# Patient Record
Sex: FEMALE | Race: WHITE | Employment: FULL TIME | URBAN - METROPOLITAN AREA
[De-identification: names, ages, dates, MRNs, and addresses within clinical notes are randomized per-mention and may not be internally consistent; named-entity substitution may affect disease eponyms.]

---

## 2017-11-17 ENCOUNTER — OFFICE VISIT (OUTPATIENT)
Dept: FAMILY MEDICINE CLINIC | Age: 37
End: 2017-11-17

## 2017-11-17 VITALS
BODY MASS INDEX: 44.41 KG/M2 | RESPIRATION RATE: 16 BRPM | DIASTOLIC BLOOD PRESSURE: 78 MMHG | OXYGEN SATURATION: 99 % | WEIGHT: 293 LBS | HEIGHT: 68 IN | SYSTOLIC BLOOD PRESSURE: 122 MMHG | HEART RATE: 63 BPM

## 2017-11-17 DIAGNOSIS — Z83.3 FAMILY HISTORY OF DIABETES MELLITUS: ICD-10-CM

## 2017-11-17 DIAGNOSIS — Z00.00 HEALTHY ADULT ON ROUTINE PHYSICAL EXAMINATION: Primary | ICD-10-CM

## 2017-11-17 DIAGNOSIS — Z13.220 SCREENING FOR HYPERLIPIDEMIA: ICD-10-CM

## 2017-11-17 LAB
A/G RATIO: 1.5 (ref 1.1–2.2)
ALBUMIN SERPL-MCNC: 4.3 G/DL (ref 3.4–5)
ALP BLD-CCNC: 74 U/L (ref 40–129)
ALT SERPL-CCNC: 13 U/L (ref 10–40)
ANION GAP SERPL CALCULATED.3IONS-SCNC: 13 MMOL/L (ref 3–16)
AST SERPL-CCNC: 17 U/L (ref 15–37)
BILIRUB SERPL-MCNC: 0.6 MG/DL (ref 0–1)
BUN BLDV-MCNC: 20 MG/DL (ref 7–20)
CALCIUM SERPL-MCNC: 9.4 MG/DL (ref 8.3–10.6)
CHLORIDE BLD-SCNC: 104 MMOL/L (ref 99–110)
CHOLESTEROL, TOTAL: 197 MG/DL (ref 0–199)
CO2: 27 MMOL/L (ref 21–32)
CREAT SERPL-MCNC: 0.6 MG/DL (ref 0.6–1.1)
GFR AFRICAN AMERICAN: >60
GFR NON-AFRICAN AMERICAN: >60
GLOBULIN: 2.9 G/DL
GLUCOSE BLD-MCNC: 73 MG/DL (ref 70–99)
HDLC SERPL-MCNC: 68 MG/DL (ref 40–60)
LDL CHOLESTEROL CALCULATED: 117 MG/DL
POTASSIUM SERPL-SCNC: 4.1 MMOL/L (ref 3.5–5.1)
SODIUM BLD-SCNC: 144 MMOL/L (ref 136–145)
TOTAL PROTEIN: 7.2 G/DL (ref 6.4–8.2)
TRIGL SERPL-MCNC: 58 MG/DL (ref 0–150)
VLDLC SERPL CALC-MCNC: 12 MG/DL

## 2017-11-17 PROCEDURE — 99385 PREV VISIT NEW AGE 18-39: CPT | Performed by: FAMILY MEDICINE

## 2017-11-17 PROCEDURE — 36415 COLL VENOUS BLD VENIPUNCTURE: CPT | Performed by: FAMILY MEDICINE

## 2017-11-17 ASSESSMENT — ENCOUNTER SYMPTOMS
NAUSEA: 0
DIARRHEA: 0
SHORTNESS OF BREATH: 0
CONSTIPATION: 0
ABDOMINAL PAIN: 0

## 2017-11-17 ASSESSMENT — PATIENT HEALTH QUESTIONNAIRE - PHQ9
1. LITTLE INTEREST OR PLEASURE IN DOING THINGS: 0
2. FEELING DOWN, DEPRESSED OR HOPELESS: 0
SUM OF ALL RESPONSES TO PHQ QUESTIONS 1-9: 0
SUM OF ALL RESPONSES TO PHQ9 QUESTIONS 1 & 2: 0

## 2017-11-17 NOTE — PATIENT INSTRUCTIONS
Patient Education        Well Visit, Ages 25 to 48: Care Instructions  Your Care Instructions  Physical exams can help you stay healthy. Your doctor has checked your overall health and may have suggested ways to take good care of yourself. He or she also may have recommended tests. At home, you can help prevent illness with healthy eating, regular exercise, and other steps. Follow-up care is a key part of your treatment and safety. Be sure to make and go to all appointments, and call your doctor if you are having problems. It's also a good idea to know your test results and keep a list of the medicines you take. How can you care for yourself at home? · Reach and stay at a healthy weight. This will lower your risk for many problems, such as obesity, diabetes, heart disease, and high blood pressure. · Get at least 30 minutes of physical activity on most days of the week. Walking is a good choice. You also may want to do other activities, such as running, swimming, cycling, or playing tennis or team sports. Discuss any changes in your exercise program with your doctor. · Do not smoke or allow others to smoke around you. If you need help quitting, talk to your doctor about stop-smoking programs and medicines. These can increase your chances of quitting for good. · Talk to your doctor about whether you have any risk factors for sexually transmitted infections (STIs). Having one sex partner (who does not have STIs and does not have sex with anyone else) is a good way to avoid these infections. · Use birth control if you do not want to have children at this time. Talk with your doctor about the choices available and what might be best for you. · Protect your skin from too much sun. When you're outdoors from 10 a.m. to 4 p.m., stay in the shade or cover up with clothing and a hat with a wide brim. Wear sunglasses that block UV rays.  Even when it's cloudy, put broad-spectrum sunscreen (SPF 30 or higher) on any condoms. For men  · Tests for sexually transmitted infections (STIs). Ask whether you should have tests for STIs. You may be at risk if you have sex with more than one person, especially if you do not wear a condom. · Testicular cancer exam. Ask your doctor whether you should check your testicles regularly. · Prostate exam. Talk to your doctor about whether you should have a blood test (called a PSA test) for prostate cancer. Experts differ on whether and when men should have this test. Some experts suggest it if you are older than 39 and are -American or have a father or brother who got prostate cancer when he was younger than 72. When should you call for help? Watch closely for changes in your health, and be sure to contact your doctor if you have any problems or symptoms that concern you. Where can you learn more? Go to https://[facility]ides. org and sign in to your [facility] [employer] account. Enter P072 in the CloudSafe box to learn more about \"Well Visit, Ages 25 to 48: Care Instructions. \"     If you do not have an account, please click on the \"Sign Up Now\" link. Current as of: July 19, 2016  Content Version: 11.3  © 3751-2577 [employer]. Care instructions adapted under license by [facility] ([facility] [place] If you have questions about a medical condition or this instruction, always ask your healthcare professional. [address]  any warranty or liability for your use of this information.

## 2017-11-17 NOTE — PROGRESS NOTES
Chief Complaint   Patient presents with    [facility]         HPI      30 y.o. female presents today to [facility] and for annual exam.  She has a concerned regarding a bump on her arm that showed up 3 weeks ago. Drained blood and small amount of white clear fluid. Non tender. Denies fever or chills. She also reports history of seizures. Started at 10years old. She is working on diet and exercise and has lost 65 pounds since January doing weight watchers and swimming. She is unsure of her last pap smear. There is no problem list on file for this patient. Past Medical History:   Diagnosis Date    Seizures ([facility] [address]     last epileptic episode was 12 years ago       Past Surgical History:   Procedure Laterality Date    KNEE CARTILAGE SURGERY      WRIST SURGERY      Bone spur removal     Most Recent Immunizations   Administered Date(s) Administered    Influenza, Quadv, 3 Years and older, IM 11/09/2017        No current outpatient prescriptions on file. No current facility-administered medications for this visit. No Known Allergies    Social History     Social History    Marital status: Single     Spouse name: N/A    Number of children: N/A    Years of education: N/A     Social History Main Topics    Smoking status: Never Smoker    Smokeless tobacco: Never Used    Alcohol use 0.6 oz/week     1 Glasses of wine per week      Comment: occasional    Drug use: No    Sexual activity: No     Other Topics Concern    None     Social History Narrative    None     Family History   Problem Relation Age of Onset    Diabetes Mother     High Blood Pressure Father     High Blood Pressure Brother                               Review Of Systems    Review of Systems   Constitutional: Negative for chills and fever. Respiratory: Negative for shortness of breath. Cardiovascular: Negative for chest pain. Gastrointestinal: Negative for abdominal pain, constipation, diarrhea and nausea. Genitourinary: Negative for dysuria. Psychiatric/Behavioral: Negative for behavioral problems. PHYSICAL EXAMINATION:    /78 (Site: Left Arm, Position: Sitting, Cuff Size: Large Adult)   Pulse 63   Resp 16   Ht 5' 7.5\" (1.715 m)   Wt 298 lb (135.2 kg)   LMP 11/09/2017 (Exact Date)   SpO2 99%   Breastfeeding? No   BMI 45.98 kg/m²     Physical Exam   Constitutional: She is oriented to person, place, and time. She appears well-developed and well-nourished. No distress. HENT:   Head: Normocephalic and atraumatic. Right Ear: External ear normal.   Left Ear: External ear normal.   Eyes: Conjunctivae and EOM are normal. Pupils are equal, round, and reactive to light. Right eye exhibits no discharge. Left eye exhibits no discharge. Cardiovascular: Normal rate, regular rhythm and normal heart sounds. Pulmonary/Chest: Effort normal and breath sounds normal. No respiratory distress. She has no wheezes. Abdominal: Soft. Bowel sounds are normal. She exhibits no distension. There is no tenderness. Neurological: She is alert and oriented to person, place, and time. Skin: Skin is warm and dry. She is not diaphoretic. Dime size wound in axilla with opening. Non tender, no warmth, minimal erythema and no purulent drainage. Psychiatric: She has a normal mood and affect. Vitals reviewed. ASSESSMENT:   Well Adult, See encounter diagnoses  Sarah was seen today for establish care. Diagnoses and all orders for this visit:    Healthy adult on routine physical examination  Area in axilla appears to be healing well. Advised to continue with neosporin. Family history of diabetes mellitus  -     HEMOGLOBIN A1C  -     Comprehensive Metabolic Panel    Screening for hyperlipidemia  -     LIPID PANEL  -     Comprehensive Metabolic Panel          Plan:   See orders and medications filed with this encounter.   The patient is advised to continue current healthy lifestyle patterns and return for routine annual checkups. Encouraged healthy diet, regular exercise. Labs checked per orders. Return in about 4 weeks (around 12/15/2017) for pap smear.

## 2017-11-18 LAB
ESTIMATED AVERAGE GLUCOSE: 108.3 MG/DL
HBA1C MFR BLD: 5.4 %

## 2017-12-19 ENCOUNTER — OFFICE VISIT (OUTPATIENT)
Dept: FAMILY MEDICINE CLINIC | Age: 37
End: 2017-12-19

## 2017-12-19 VITALS
SYSTOLIC BLOOD PRESSURE: 118 MMHG | HEIGHT: 68 IN | DIASTOLIC BLOOD PRESSURE: 74 MMHG | HEART RATE: 65 BPM | WEIGHT: 293 LBS | RESPIRATION RATE: 16 BRPM | OXYGEN SATURATION: 99 % | TEMPERATURE: 98.2 F | BODY MASS INDEX: 44.41 KG/M2

## 2017-12-19 DIAGNOSIS — Z11.3 SCREEN FOR STD (SEXUALLY TRANSMITTED DISEASE): ICD-10-CM

## 2017-12-19 DIAGNOSIS — Z01.419 ENCOUNTER FOR GYNECOLOGICAL EXAMINATION WITHOUT ABNORMAL FINDING: Primary | ICD-10-CM

## 2017-12-19 DIAGNOSIS — Z23 NEED FOR TDAP VACCINATION: ICD-10-CM

## 2017-12-19 PROCEDURE — 90715 TDAP VACCINE 7 YRS/> IM: CPT | Performed by: FAMILY MEDICINE

## 2017-12-19 PROCEDURE — 90471 IMMUNIZATION ADMIN: CPT | Performed by: FAMILY MEDICINE

## 2017-12-19 PROCEDURE — 99395 PREV VISIT EST AGE 18-39: CPT | Performed by: FAMILY MEDICINE

## 2017-12-19 NOTE — PATIENT INSTRUCTIONS
Patient Education        Pap Test: Care Instructions  Your Care Instructions    The Pap test (also called a Pap smear) is a screening test for cancer of the cervix, which is the lower part of the uterus that opens into the vagina. The test can help your doctor find early changes in the cells that could lead to cancer. The sample of cells taken during your test has been sent to a lab so that an expert can look at the cells. It usually takes a week or two to get the results back. Follow-up care is a key part of your treatment and safety. Be sure to make and go to all appointments, and call your doctor if you are having problems. It's also a good idea to know your test results and keep a list of the medicines you take. What do the results mean? · A normal result means that the test did not find any abnormal cells in the sample. · An abnormal result can mean many things. Most of these are not cancer. The results of your test may be abnormal because:  ¨ You have an infection of the vagina or cervix, such as a yeast infection. ¨ You have an IUD (intrauterine device for birth control). ¨ You have low estrogen levels after menopause that are causing the cells to change. ¨ You have cell changes that may be a sign of precancer or cancer. The results are ranked based on how serious the changes might be. There are many other reasons why you might not get a normal result. If the results were abnormal, you may need to get another test within a few weeks or months. If the results show changes that could be a sign of cancer, you may need a test called a colposcopy, which provides a more complete view of the cervix. Sometimes the lab cannot use the sample because it does not contain enough cells or was not preserved well. If so, you may need to have the test again. This is not common, but it does happen from time to time. When should you call for help?   Watch closely for changes in your health, and be sure to contact your

## 2017-12-20 LAB
CANDIDA SPECIES, DNA PROBE: ABNORMAL
GARDNERELLA VAGINALIS, DNA PROBE: ABNORMAL
HIV-1 AND HIV-2 ANTIBODIES: NORMAL
RPR: NORMAL
TRICHOMONAS VAGINALIS DNA: ABNORMAL

## 2017-12-21 LAB
HPV COMMENT: NORMAL
HPV TYPE 16: NOT DETECTED
HPV TYPE 18: NOT DETECTED
HPVOH (OTHER TYPES): NOT DETECTED

## 2017-12-22 ENCOUNTER — TELEPHONE (OUTPATIENT)
Dept: FAMILY MEDICINE CLINIC | Age: 37
End: 2017-12-22

## 2017-12-22 DIAGNOSIS — B96.89 BACTERIAL VAGINOSIS: Primary | ICD-10-CM

## 2017-12-22 DIAGNOSIS — N76.0 BACTERIAL VAGINOSIS: Primary | ICD-10-CM

## 2017-12-22 RX ORDER — METRONIDAZOLE 500 MG/1
500 TABLET ORAL 2 TIMES DAILY
Qty: 14 TABLET | Refills: 0 | Status: SHIPPED | OUTPATIENT
Start: 2017-12-22 | End: 2017-12-29

## 2017-12-27 LAB
C. TRACHOMATIS DNA,THIN PREP: NEGATIVE
N. GONORRHOEAE DNA, THIN PREP: NEGATIVE

## 2018-06-08 ENCOUNTER — OFFICE VISIT (OUTPATIENT)
Dept: FAMILY MEDICINE CLINIC | Age: 38
End: 2018-06-08

## 2018-06-08 VITALS
OXYGEN SATURATION: 98 % | SYSTOLIC BLOOD PRESSURE: 120 MMHG | HEART RATE: 70 BPM | DIASTOLIC BLOOD PRESSURE: 80 MMHG | WEIGHT: 277 LBS | BODY MASS INDEX: 42.72 KG/M2

## 2018-06-08 DIAGNOSIS — M67.88 ACHILLES TENDINOSIS OF LEFT LOWER EXTREMITY: Primary | ICD-10-CM

## 2018-06-08 PROCEDURE — 99214 OFFICE O/P EST MOD 30 MIN: CPT | Performed by: FAMILY MEDICINE

## 2018-06-20 ENCOUNTER — OFFICE VISIT (OUTPATIENT)
Dept: ORTHOPEDIC SURGERY | Age: 38
End: 2018-06-20

## 2018-06-20 VITALS
HEIGHT: 68 IN | HEART RATE: 78 BPM | BODY MASS INDEX: 41.97 KG/M2 | DIASTOLIC BLOOD PRESSURE: 78 MMHG | SYSTOLIC BLOOD PRESSURE: 130 MMHG | WEIGHT: 276.9 LBS

## 2018-06-20 DIAGNOSIS — M76.61 ACHILLES TENDINITIS, RIGHT LEG: ICD-10-CM

## 2018-06-20 DIAGNOSIS — M79.671 PAIN OF RIGHT HEEL: Primary | ICD-10-CM

## 2018-06-20 DIAGNOSIS — M65.9 TENOSYNOVITIS OF RIGHT ANKLE: ICD-10-CM

## 2018-06-20 PROBLEM — M65.971 TENOSYNOVITIS OF RIGHT ANKLE: Status: ACTIVE | Noted: 2018-06-20

## 2018-06-20 PROCEDURE — 99203 OFFICE O/P NEW LOW 30 MIN: CPT | Performed by: PODIATRIST

## 2018-06-20 PROCEDURE — L4361 PNEUMA/VAC WALK BOOT PRE OTS: HCPCS | Performed by: PODIATRIST

## 2018-06-21 ENCOUNTER — TELEPHONE (OUTPATIENT)
Dept: ORTHOPEDIC SURGERY | Age: 38
End: 2018-06-21

## 2018-07-01 DIAGNOSIS — Z86.69 HISTORY OF SEIZURE DISORDER: Primary | ICD-10-CM

## 2018-07-11 ENCOUNTER — OFFICE VISIT (OUTPATIENT)
Dept: ORTHOPEDIC SURGERY | Age: 38
End: 2018-07-11

## 2018-07-11 VITALS
HEIGHT: 69 IN | WEIGHT: 277.6 LBS | BODY MASS INDEX: 41.12 KG/M2 | SYSTOLIC BLOOD PRESSURE: 124 MMHG | HEART RATE: 78 BPM | DIASTOLIC BLOOD PRESSURE: 78 MMHG

## 2018-07-11 DIAGNOSIS — M76.61 ACHILLES TENDINITIS, RIGHT LEG: Primary | ICD-10-CM

## 2018-07-11 PROCEDURE — 99213 OFFICE O/P EST LOW 20 MIN: CPT | Performed by: PODIATRIST

## 2018-07-11 NOTE — PROGRESS NOTES
HISTORY OF PRESENT ILLNESS:  This is a return visit for a patient with a chief complaint of right Achilles tendinitis. She states that she's no longer having pain and she has not walked out of the boot much. PHYSICAL EXAM:  She currently does not have any palpable tenderness of the Achilles tendon. There is focal edema over the Achilles tendon. There is no erythema, ecchymosis, or edema otherwise. There is minimal pain of the Achilles tendon insertion itself. There is no palpable deficit and Snowden's test is negative for a complete rupture. There is full-strength with plantar flexion at the ankle. This is symmetrical.    Pedal pulses are palpable, bilateral.  The sensation is grossly intact, bilateral.      ASSESSMENT: Achilles Tendinitis and Tenosynovitis, right      PLAN: She will try to slowly transition out of the boot and into over-the-counter orthotics and athletic shoes. She will gradually increase her activity as we discussed. I'll see her back as needed.

## 2018-08-31 DIAGNOSIS — M25.562 ACUTE PAIN OF LEFT KNEE: Primary | ICD-10-CM

## 2018-09-11 ENCOUNTER — INITIAL CONSULT (OUTPATIENT)
Dept: NEUROLOGY | Age: 38
End: 2018-09-11

## 2018-09-11 VITALS
DIASTOLIC BLOOD PRESSURE: 72 MMHG | OXYGEN SATURATION: 98 % | HEIGHT: 69 IN | BODY MASS INDEX: 41.92 KG/M2 | WEIGHT: 283 LBS | SYSTOLIC BLOOD PRESSURE: 123 MMHG | HEART RATE: 61 BPM

## 2018-09-11 DIAGNOSIS — G40.309 GENERALIZED IDIOPATHIC EPILEPSY, NOT INTRACTABLE, WITHOUT STATUS EPILEPTICUS (HCC): Primary | ICD-10-CM

## 2018-09-11 DIAGNOSIS — G93.2 PSEUDOTUMOR CEREBRI SYNDROME: ICD-10-CM

## 2018-09-11 PROCEDURE — 99204 OFFICE O/P NEW MOD 45 MIN: CPT | Performed by: PSYCHIATRY & NEUROLOGY

## 2018-09-11 NOTE — PROGRESS NOTES
musculoskeletal, endocrine, skin, SHEENT, genitourinary, psychiatric and neurologic systems was obtained and updated today and is unremarkable except as mentioned in my HPI        Exam:   Constitutional:   Vitals:    09/11/18 1208   BP: 123/72   Pulse: 61   SpO2: 98%   Weight: 283 lb (128.4 kg)   Height: 5' 9\" (1.753 m)       General appearance: well-nourished. Eye: No icterus. No blurring of optic disc. Neck: supple  Cardiovascular: No carotid bruit. No lower leg edema with good pulsation. Mental Status: Oriented to person, place, problem, and time. Fluent speech. Good fund of knowledge. Normal attention span and concentration. Cranial Nerves:   II: Visual fields: Full to confrontation  III: Pupils: equal, round, reactive to light  III,IV,VI: Extra Ocular Movements are intact. No nystagmus  V: Facial sensation is intact to pin prick and light touch  VII: Facial strength and movements: intact and symmetric. VIII: Hearing: Intact to finger rub bilaterally  IX: Palate elevation is symmetric  XI: Shoulder shrug is intact  XII: Tongue movements are normal  Musculoskeletal: 5/5 in all 4 extremities. Normal tone. Reflexes: Bilateral biceps 2/4, triceps 2/4, brachial radialis 2/4, knee 2/4 and ankle 2/4. Planters: flexor bilaterally. Coordination: no pronator drift, no dysmetria with FNF testing. No tremors. Sensation: normal to all modalities. Gait/Posture: steady      Medical decision making:  I personally reviewed and updated social history, past medical history, medications, allergy, surgical history, and family history as documented in the patient's electronic health records. Labs and/or neuroimaging and other test results reviewed and discussed with the patient. Reviewed notes from other physicians. Provided patient education regarding risk, benefits and treatment options as well as adherence to medication regimen and side effect from these medications. Diagnosis Orders   1.  Generalized

## 2018-09-11 NOTE — LETTER
Comment: occasional     Family History   Problem Relation Age of Onset    Diabetes Mother     High Blood Pressure Father     High Blood Pressure Brother      Past Surgical History:   Procedure Laterality Date    KNEE CARTILAGE SURGERY      WRIST SURGERY      Bone spur removal       ROS : A 10-12 system review of constitutional, cardiovascular, respiratory, musculoskeletal, endocrine, skin, SHEENT, genitourinary, psychiatric and neurologic systems was obtained and updated today and is unremarkable except as mentioned in my HPI        Exam:   Constitutional:   Vitals:    09/11/18 1208   BP: 123/72   Pulse: 61   SpO2: 98%   Weight: 283 lb (128.4 kg)   Height: 5' 9\" (1.753 m)       General appearance: well-nourished. Eye: No icterus. No blurring of optic disc. Neck: supple  Cardiovascular: No carotid bruit. No lower leg edema with good pulsation. Mental Status: Oriented to person, place, problem, and time. Fluent speech. Good fund of knowledge. Normal attention span and concentration. Cranial Nerves:   II: Visual fields: Full to confrontation  III: Pupils: equal, round, reactive to light  III,IV,VI: Extra Ocular Movements are intact. No nystagmus  V: Facial sensation is intact to pin prick and light touch  VII: Facial strength and movements: intact and symmetric. VIII: Hearing: Intact to finger rub bilaterally  IX: Palate elevation is symmetric  XI: Shoulder shrug is intact  XII: Tongue movements are normal  Musculoskeletal: 5/5 in all 4 extremities. Normal tone. Reflexes: Bilateral biceps 2/4, triceps 2/4, brachial radialis 2/4, knee 2/4 and ankle 2/4. Planters: flexor bilaterally. Coordination: no pronator drift, no dysmetria with FNF testing. No tremors. Sensation: normal to all modalities.   Gait/Posture: steady      Medical decision making:  I personally reviewed and updated social history, past medical history, medications, allergy, surgical history, and family history as documented in the patient's electronic health records. Labs and/or neuroimaging and other test results reviewed and discussed with the patient. Reviewed notes from other physicians. Provided patient education regarding risk, benefits and treatment options as well as adherence to medication regimen and side effect from these medications. Diagnosis Orders   1. Generalized idiopathic epilepsy, not intractable, without status epilepticus ([facility] [address]  MRI Brain WO Contrast    EEG   2. Pseudotumor cerebri syndrome  MRI Brain WO Contrast       Assessment:  The patient describes history of epilepsy which could be consistent with idiopathic generalized epilepsy. Has been seizure-free for quite some time. We'll repeat EEG to rule out tendency for epilepsy or interictal epileptiform discharges. If EEG is abnormal, would recommend restarting AED due to high risk of recurrence. In the meantime, we discussed risk of scuba diving classes and possible seizure recurrence. I explained to the patient that I cannot guarantee recurrence of her seizure while scuba diving which could be somewhat threatening. The patient was advised to consider different sport activity. As for possible pseudotumor, we'll schedule MRI of the brain first followed by LP to check for opening and closing pressure. The patient is asymptomatic. Could be caused by her history of weight gain. She'll follow-up with me after the above recommendations. Please do not hesitate to contact me, should you have any questions or concerns regarding the care of Tom Figueroa     Sincerely,    Alvino Blackwell MD    This dictation was generated by voice recognition computer software. Although all attempts are made to edit the dictation for accuracy, there may be errors in the transcription that are not intended.

## 2018-09-13 ENCOUNTER — OFFICE VISIT (OUTPATIENT)
Dept: ORTHOPEDIC SURGERY | Age: 38
End: 2018-09-13

## 2018-09-13 VITALS — HEIGHT: 69 IN | WEIGHT: 283.07 LBS | BODY MASS INDEX: 41.93 KG/M2

## 2018-09-13 DIAGNOSIS — M76.52 PATELLAR TENDINITIS OF LEFT KNEE: ICD-10-CM

## 2018-09-13 DIAGNOSIS — M25.562 LEFT KNEE PAIN, UNSPECIFIED CHRONICITY: Primary | ICD-10-CM

## 2018-09-13 PROCEDURE — 99243 OFF/OP CNSLTJ NEW/EST LOW 30: CPT | Performed by: ORTHOPAEDIC SURGERY

## 2018-09-13 RX ORDER — DEXAMETHASONE SODIUM PHOSPHATE 4 MG/ML
INJECTION, SOLUTION INTRA-ARTICULAR; INTRALESIONAL; INTRAMUSCULAR; INTRAVENOUS; SOFT TISSUE
Qty: 30 ML | Refills: 0 | Status: SHIPPED | OUTPATIENT
Start: 2018-09-13 | End: 2019-07-19 | Stop reason: CLARIF

## 2018-09-13 NOTE — PROGRESS NOTES
normal.      LUMBAR SPINE: The skin is warm and dry. There is no swelling, warmth, or erythema. Range of motion is within normal limits. There is no paraspinal or spinous process tenderness. Ipsilateral and contralateral straight leg raising tests are negative. The distal neurovascular exam is grossly intact and symmetric. X-RAYS: 4 views weightbearing AP, lateral. PA and sunrise of the left knee were obtained and reviewed, they show no periosteal reaction, medullary lesions, or osteopenia. Joint spaces are well maintained. No evidence of fracture or dislocation. Assessment :  Left knee patella tendinitis    Impression:  Encounter Diagnoses   Name Primary?  Left knee pain, unspecified chronicity Yes    Patellar tendinitis of left knee        Office Procedures:  Orders Placed This Encounter   Procedures    XR KNEE LEFT (MIN 4 VIEWS)    [facility] - [facility]     Referral Priority:   Routine     Referral Type:   Eval and Treat     Referral Reason:   Specialty Services Required     Requested Specialty:   Physical Therapy     Number of Visits Requested:   1     No orders of the defined types were placed in this encounter. Treatment Plan:  We discussed the pathophysiology of patella tendinitis, this is an overuse injury directly related to walking specifically hills, I recommend avoiding aggravating activities until symptoms resolve, also recommended continuation of over-the-counter anti-inflammatories to help decrease inflammation, continue ice, we gave her prescription for physical therapy to include iontophoresis and dry needling. She can resume all activities as tolerated. I highly encouraged crosstraining and decreasing hills during her training to help alleviate symptoms. She should be just fine to participate in the walk this coming fall. She will follow up with me in 2-3 months for reevaluation if her symptoms are not significantly improved.  Patient agrees with this plan, all of their questions were answered best of our ability and to their satisfaction.         Kathrin Cervantes

## 2018-09-17 ENCOUNTER — HOSPITAL ENCOUNTER (OUTPATIENT)
Dept: MRI IMAGING | Age: 38
Discharge: HOME | End: 2018-09-17
Payer: COMMERCIAL

## 2018-09-17 ENCOUNTER — HOSPITAL ENCOUNTER (OUTPATIENT)
Dept: NEUROLOGY | Age: 38
Discharge: HOME | End: 2018-09-17
Payer: COMMERCIAL

## 2018-09-17 DIAGNOSIS — G93.2 PSEUDOTUMOR CEREBRI SYNDROME: ICD-10-CM

## 2018-09-17 DIAGNOSIS — G40.309 GENERALIZED IDIOPATHIC EPILEPSY, NOT INTRACTABLE, WITHOUT STATUS EPILEPTICUS (HCC): ICD-10-CM

## 2018-09-17 PROCEDURE — 70551 MRI BRAIN STEM W/O DYE: CPT

## 2018-09-17 PROCEDURE — 95819 EEG AWAKE AND ASLEEP: CPT

## 2018-09-17 PROCEDURE — 95816 EEG AWAKE AND DROWSY: CPT | Performed by: PSYCHIATRY & NEUROLOGY

## 2018-09-18 ENCOUNTER — HOSPITAL ENCOUNTER (OUTPATIENT)
Dept: PHYSICAL THERAPY | Age: 38
Setting detail: THERAPIES SERIES
Discharge: HOME | End: 2018-09-18
Payer: COMMERCIAL

## 2018-09-18 PROCEDURE — 97112 NEUROMUSCULAR REEDUCATION: CPT

## 2018-09-18 PROCEDURE — G8978 MOBILITY CURRENT STATUS: HCPCS

## 2018-09-18 PROCEDURE — 97161 PT EVAL LOW COMPLEX 20 MIN: CPT

## 2018-09-18 PROCEDURE — G8979 MOBILITY GOAL STATUS: HCPCS

## 2018-09-18 PROCEDURE — 97110 THERAPEUTIC EXERCISES: CPT

## 2018-09-18 NOTE — PROCEDURES
Electroencephalogram report      Geovanni Merino MD    [facility]  [address] [address] [address], [address] [facility] [address] [facility], [address] (Phone)  [phone] (Fax)      Patient: Remi Dick    MR Number: [id number]  YOB: 1980  Date of Visit: 9/17/2018    Clinical History:  The patient is a 30y.o. years old female with history of epilepsy. Method: This is a routine digitalized EEG recording. The EEG was performed using the international 10/20 of electrode placements using both referential and bipolar montages. The patient was awake, and drowsy during recording. Photic stimulation and hyperventilation were performed. Findings: The background of the EEG showed normal alpha posterior background of 9-10 HZ and amplitude of 20-40 UV. This background was symmetric, waxing and waning, and reactive with eye opening and closure. As the patient became drowsy generalized diffuse slowing was seen through recording at 6-7 HZ. This generalized slowing was symmetric, non rhythmical, and continuous. No spike or sharp waves were seen. Photic stimulation produced normal posterior driving at higher frequency and hyperventilation did not activate the EEG. Impression: This EEG is normal. No epileptiform discharges, focal and lateralized abnormalities were noted.      Geovanni Merino MD      Board certified in neurology, clinical neurophysiology, and sleep medicine  [facility]  [address] [facility]  [address] [place] [id number]  Phone: [phone]

## 2018-09-18 NOTE — PLAN OF CARE
[address] [facility], [address] [facility] [address], [address] [facility] [address]  Phone: [phone]   Fax:     [phone]       Physical Therapy Certification    Dear Referring Practitioner: Dr. Gurjit Mercedes ,    We had the pleasure of evaluating the following patient for physical therapy services at [address] [place] [address]. A summary of our findings can be found in the initial assessment below. This includes our plan of care. If you have any questions or concerns regarding these findings, please do not hesitate to contact me at the office phone number checked above. Thank you for the referral.       Physician Signature:_______________________________Date:__________________  By signing above (or electronic signature), therapists plan is approved by physician      Patient: Tom Talley   : 1980   MRN: [id number]  Referring Physician: Referring Practitioner: Dr. Gurjit Mercedes       Evaluation Date: 2018      Medical Diagnosis Information:  Diagnosis: M76.52 (ICD-10-CM) - Patellar tendinitis of left knee   Treatment Diagnosis: L knee pain                                         Insurance information: PT Insurance Information: [employer]  $0CP  80/20  30 PT NO AUTH REQ     Precautions/ Contra-indications: Epilepsy   Latex Allergy:  [x]NO      []YES  Preferred Language for Healthcare:   [x]English       []other:    SUBJECTIVE: Patient stated complaint:Pt presents to clinic with L patellar tendon insertion pain for approximately 3 weeks after increasing her walking mileage. Pt also states she has significant pain in her R achilles tendon which has been bothering her for 3 months which is a recurrent injury. Pt is working up to participating in a 3 day walk challenge and has been walking 30 miles a week which she believes has aggravated her knee and achilles tendon.  Pt is also a  limited or restricted    ASSESSMENT:   Functional Impairments:     []Noted lumbar/proximal hip/LE joint hypomobility   []Decreased LE functional ROM   [x]Decreased core/proximal hip strength and neuromuscular control   [x]Decreased LE functional strength   []Reduced balance/proprioceptive control   []other:      Functional Activity Limitations (from functional questionnaire and intake)   []Reduced ability to tolerate prolonged functional positions   []Reduced ability or difficulty with changes of positions or transfers between positions   []Reduced ability to maintain good posture and demonstrate good body mechanics with sitting, bending, and lifting   []Reduced ability to sleep   [] Reduced ability or tolerance with driving and/or computer work   []Reduced ability to perform lifting, carrying tasks   [x]Reduced ability to squat   []Reduced ability to forward bend   [x]Reduced ability to ambulate prolonged functional periods/distances/surfaces   [x]Reduced ability to ascend/descend stairs   [x]Reduced ability to run, hop, cut or jump   []other:    Participation Restrictions   []Reduced participation in self care activities   []Reduced participation in home management activities   []Reduced participation in work activities   []Reduced participation in social activities. [x]Reduced participation in sport/recreation activities. Classification :    []Signs/symptoms consistent with post-surgical status including decreased ROM, strength and function.    []Signs/symptoms consistent with joint sprain/strain   []Signs/symptoms consistent with patella-femoral syndrome   []Signs/symptoms consistent with knee OA/hip OA   []Signs/symptoms consistent with internal derangement of knee/Hip   [x]Signs/symptoms consistent with functional hip weakness/NMR control      [x]Signs/symptoms consistent with tendinitis/tendinosis    [x]signs/symptoms consistent with pathology which may benefit from Dry needling      []other: HEP.    HEP instruction: (see scanned forms)    GOALS:  Patient stated goal: Pt would like to return to pain free recreational activities. Therapist goals for Patient:   Short Term Goals: To be achieved in: 2 weeks  1. Independent in HEP and progression per patient tolerance, in order to prevent re-injury. 2. Patient will have a decrease in pain to facilitate improvement in movement, function, and ADLs as indicated by Functional Deficits. Long Term Goals: To be achieved in: 12 weeks  1. Disability index score of 0% or less for the LEFS to assist with reaching prior level of function. 2. Patient will demonstrate increased AROM to allow for proper joint functioning as indicated by patients Functional Deficits. 3. Patient will demonstrate an increase in Strength to good proximal hip strength and control, within 5lb HHD in LE to allow for proper functional mobility as indicated by patients Functional Deficits. 4. Patient will return to all functional activities without increased symptoms or restriction.    5. Pt will demonstrate the ability to walk 10 miles with 0/10 pain. (patient specific functional goal)       Electronically signed by:  Kasey Cornell, PT,DPT

## 2018-09-19 NOTE — FLOWSHEET NOTE
for activities related to strengthening, flexibility, endurance, ROM for improvements in LE, proximal hip, and core control with self care, mobility, lifting, ambulation.  [] (80373) Provided verbal/tactile cueing for activities related to improving balance, coordination, kinesthetic sense, posture, motor skill, proprioception  to assist with LE, proximal hip, and core control in self care, mobility, lifting, ambulation and eccentric single leg control.      NMR and Therapeutic Activities:    [x] (28455 or 08430) Provided verbal/tactile cueing for activities related to improving balance, coordination, kinesthetic sense, posture, motor skill, proprioception and motor activation to allow for proper function of core, proximal hip and LE with self care and ADLs  [] (68775) Gait Re-education- Provided training and instruction to the patient for proper LE, core and proximal hip recruitment and positioning and eccentric body weight control with ambulation re-education including up and down stairs     Home Exercise Program:    [x] (52829) Reviewed/Progressed HEP activities related to strengthening, flexibility, endurance, ROM of core, proximal hip and LE for functional self-care, mobility, lifting and ambulation/stair navigation   [] (47176)Reviewed/Progressed HEP activities related to improving balance, coordination, kinesthetic sense, posture, motor skill, proprioception of core, proximal hip and LE for self care, mobility, lifting, and ambulation/stair navigation      Manual Treatments:  PROM / STM / Oscillations-Mobs:  G-I, II, III, IV (PA's, Inf., Post.)  [x] (46813) Provided manual therapy to mobilize LE, proximal hip and/or LS spine soft tissue/joints for the purpose of modulating pain, promoting relaxation,  increasing ROM, reducing/eliminating soft tissue swelling/inflammation/restriction, improving soft tissue extensibility and allowing for proper ROM for normal function with self care, mobility, lifting and Prognosis: [x] Good [] Fair  [] Poor    Patient Requires Follow-up: [x] Yes  [] No    PLAN: See eval  [] Continue per plan of care [] Alter current plan (see comments)  [x] Plan of care initiated [] Hold pending MD visit [] Discharge    Electronically signed by: Jessica Webb PT,DPT

## 2018-09-21 ENCOUNTER — TELEPHONE (OUTPATIENT)
Dept: NEUROLOGY | Age: 38
End: 2018-09-21

## 2018-09-21 DIAGNOSIS — G93.2 PSEUDOTUMOR CEREBRI SYNDROME: Primary | ICD-10-CM

## 2018-09-24 NOTE — TELEPHONE ENCOUNTER
Spoke with patient in regards to MRI results. Patient is asking if you would recommend her to go forward with LP? Please advise. Last seen 09.11.18     Diagnosis Orders   1. Generalized idiopathic epilepsy, not intractable, without status epilepticus ([facility] [address]  MRI Brain WO Contrast     EEG   2. Pseudotumor cerebri syndrome  MRI Brain WO Contrast         Assessment:  The patient describes history of epilepsy which could be consistent with idiopathic generalized epilepsy. Has been seizure-free for quite some time. We'll repeat EEG to rule out tendency for epilepsy or interictal epileptiform discharges. If EEG is abnormal, would recommend restarting AED due to high risk of recurrence. In the meantime, we discussed risk of scuba diving classes and possible seizure recurrence. I explained to the patient that I cannot guarantee recurrence of her seizure while scuba diving which could be somewhat threatening. The patient was advised to consider different sport activity.     As for possible pseudotumor, we'll schedule MRI of the brain first followed by LP to check for opening and closing pressure. The patient is asymptomatic. Could be caused by her history of weight gain.   She'll follow-up with me after the above recommendations.

## 2018-09-25 ENCOUNTER — HOSPITAL ENCOUNTER (OUTPATIENT)
Dept: PHYSICAL THERAPY | Age: 38
Setting detail: THERAPIES SERIES
Discharge: HOME | End: 2018-09-25
Payer: COMMERCIAL

## 2018-09-25 PROCEDURE — 97110 THERAPEUTIC EXERCISES: CPT

## 2018-09-25 PROCEDURE — 97140 MANUAL THERAPY 1/> REGIONS: CPT

## 2018-09-25 PROCEDURE — 97033 APP MDLTY 1+IONTPHRSIS EA 15: CPT

## 2018-09-25 PROCEDURE — 97112 NEUROMUSCULAR REEDUCATION: CPT

## 2018-10-04 ENCOUNTER — HOSPITAL ENCOUNTER (OUTPATIENT)
Dept: PHYSICAL THERAPY | Age: 38
Setting detail: THERAPIES SERIES
Discharge: HOME | End: 2018-10-04
Payer: COMMERCIAL

## 2018-10-04 PROCEDURE — 97033 APP MDLTY 1+IONTPHRSIS EA 15: CPT

## 2018-10-04 PROCEDURE — 97140 MANUAL THERAPY 1/> REGIONS: CPT

## 2018-10-04 PROCEDURE — 97110 THERAPEUTIC EXERCISES: CPT

## 2018-10-04 PROCEDURE — 97112 NEUROMUSCULAR REEDUCATION: CPT

## 2018-10-04 NOTE — FLOWSHEET NOTE
gloves along with hand  prior to inserting the needles. All needles where removed and discarded in the appropriate sharps container. Electrical Stimulation for neuromuscular re-education of vastus medialis. Waveform: [place] Pulse; Duty Cycle: 50%; Ramp: 2 sec; Amplitude: 4 Volts; Treatment time: 5 min. Pt tolerated tx well. Therapeutic Ex/NMR Ex Sets/sec Reps Notes HEP   HR up 2  2 down 3 10 Regressed due to ankle P! x   Gastroc/Soleus S 20'' 3  x   SLR flex  x   Mini squats 3 10  x   Ant hip S 20'' 3  x   HS S 20'' 3  x   Quad S 10'' 5  x   Standing hip abd versa band 3 10 Green x   Leg Press    Bike 5'  Resistance 3                                       Pt Education: Pt educated on POC and HEP. 5'  Discussed DN modality (side effects, outcomes, etc). Reviewed activity return trial (i.e. Walking). Manual Intervention       DN 10'      STM to GS and quad following DN 5'                                  NMR re-education       Vastus medialis NMES for patellar tracking (as above) 5'                                                                Therapeutic Exercise and NMR EXR  [x] (89369) Provided verbal/tactile cueing for activities related to strengthening, flexibility, endurance, ROM for improvements in LE, proximal hip, and core control with self care, mobility, lifting, ambulation.  [] (96089) Provided verbal/tactile cueing for activities related to improving balance, coordination, kinesthetic sense, posture, motor skill, proprioception  to assist with LE, proximal hip, and core control in self care, mobility, lifting, ambulation and eccentric single leg control.      NMR and Therapeutic Activities:    [x] (66141 or 52423) Provided verbal/tactile cueing for activities related to improving balance, coordination, kinesthetic sense, posture, motor skill, proprioception and motor activation to allow for proper function of core, proximal hip and LE with self care and ADLs  [] (34734) Gait Re-education- Provided training and instruction to the patient for proper LE, core and proximal hip recruitment and positioning and eccentric body weight control with ambulation re-education including up and down stairs     Home Exercise Program:    [x] (14583) Reviewed/Progressed HEP activities related to strengthening, flexibility, endurance, ROM of core, proximal hip and LE for functional self-care, mobility, lifting and ambulation/stair navigation   [] (04584)Reviewed/Progressed HEP activities related to improving balance, coordination, kinesthetic sense, posture, motor skill, proprioception of core, proximal hip and LE for self care, mobility, lifting, and ambulation/stair navigation      Manual Treatments:  PROM / STM / Oscillations-Mobs:  G-I, II, III, IV (PA's, Inf., Post.)  [x] (97046) Provided manual therapy to mobilize LE, proximal hip and/or LS spine soft tissue/joints for the purpose of modulating pain, promoting relaxation,  increasing ROM, reducing/eliminating soft tissue swelling/inflammation/restriction, improving soft tissue extensibility and allowing for proper ROM for normal function with self care, mobility, lifting and ambulation. Modalities:  Iontophoresis for inflammation reduction/pain reduction at 80 mA/minutes with 1.3 mL of dexamethasone in take home patch form. Heat pack x10'     Charges:  Timed Code Treatment Minutes: 45   Total Treatment Minutes: 60      [] EVAL (LOW) 51141 (typically 20 minutes face-to-face)    [] EVAL  [x] ZS(01085) x  1   [x] IONTO   [x] NMR (70854) x  1   [] VASO  [x] Manual (80483) x  1    [] Other:  [] TA x       [] Mech Traction (43957)  [] ES(attended) (78504)      [] ES (un) (08054):     GOALS: Patient stated goal: Pt would like to return to pain free recreational activities.      Therapist goals for Patient:   Short Term Goals: To be achieved in: 2 weeks  1. Independent in HEP and progression per patient tolerance, in order to prevent re-injury.    2. Patient will have a decrease in pain to facilitate improvement in movement, function, and ADLs as indicated by Functional Deficits.     Long Term Goals: To be achieved in: 12 weeks  1. Disability index score of 0% or less for the LEFS to assist with reaching prior level of function. 2. Patient will demonstrate increased AROM to allow for proper joint functioning as indicated by patients Functional Deficits. 3. Patient will demonstrate an increase in Strength to good proximal hip strength and control, within 5lb HHD in LE to allow for proper functional mobility as indicated by patients Functional Deficits. 4. Patient will return to all functional activities without increased symptoms or restriction. 5. Pt will demonstrate the ability to walk 10 miles with 0/10 pain. Progression Towards Functional goals:  [x] Patient is progressing as expected towards functional goals listed. [] Progression is slowed due to complexities listed. [] Progression has been slowed due to co-morbidities. [] Plan just implemented, too soon to assess goals progression  [] Other:     ASSESSMENT:  Continued good tolerance of DN modality. Pt with good challenge/tolerance of exercises/stretches as above. Will progress as appropriate, will continue to monitor symptoms/improvement session to session.       Treatment/Activity Tolerance:  [x] Patient tolerated treatment well [] Patient limited by fatique  [] Patient limited by pain  [] Patient limited by other medical complications  [] Other:     Prognosis: [x] Good [] Fair  [] Poor    Patient Requires Follow-up: [x] Yes  [] No    PLAN: See eval  [x] Continue per plan of care [] Alter current plan (see comments)  [] Plan of care initiated [] Hold pending MD visit [] Discharge    Electronically signed by: Barnett Boas PT,DPT

## 2018-10-09 ENCOUNTER — HOSPITAL ENCOUNTER (OUTPATIENT)
Dept: INTERVENTIONAL RADIOLOGY/VASCULAR | Age: 38
Discharge: HOME | End: 2018-10-09
Payer: COMMERCIAL

## 2018-10-09 VITALS
DIASTOLIC BLOOD PRESSURE: 70 MMHG | WEIGHT: 278 LBS | HEIGHT: 69 IN | OXYGEN SATURATION: 99 % | SYSTOLIC BLOOD PRESSURE: 137 MMHG | HEART RATE: 78 BPM | TEMPERATURE: 98.6 F | RESPIRATION RATE: 16 BRPM | BODY MASS INDEX: 41.18 KG/M2

## 2018-10-09 DIAGNOSIS — G93.2 PSEUDOTUMOR CEREBRI SYNDROME: ICD-10-CM

## 2018-10-09 LAB
CLOT EVALUATION CSF: ABNORMAL
COLOR CSF: COLORLESS
GLUCOSE, CSF: 48 MG/DL (ref 40–80)
NO DIFFERENTIAL CSF: ABNORMAL
PROTEIN CSF: 41 MG/DL (ref 15–45)
RBC CSF: 9 /CUMM
TUBE NUMBER CSF: ABNORMAL
WBC CSF: 1 /CUMM (ref 0–5)

## 2018-10-09 PROCEDURE — 77003 FLUOROGUIDE FOR SPINE INJECT: CPT

## 2018-10-09 PROCEDURE — 82945 GLUCOSE OTHER FLUID: CPT

## 2018-10-09 PROCEDURE — 84157 ASSAY OF PROTEIN OTHER: CPT

## 2018-10-09 PROCEDURE — 62270 DX LMBR SPI PNXR: CPT

## 2018-10-09 PROCEDURE — 89050 BODY FLUID CELL COUNT: CPT

## 2018-10-09 ASSESSMENT — PAIN SCALES - GENERAL
PAINLEVEL_OUTOF10: 2
PAINLEVEL_OUTOF10: 3
PAINLEVEL_OUTOF10: 4
PAINLEVEL_OUTOF10: 4

## 2018-10-09 ASSESSMENT — PAIN - FUNCTIONAL ASSESSMENT: PAIN_FUNCTIONAL_ASSESSMENT: 0-10

## 2018-10-11 ENCOUNTER — ANESTHESIA EVENT (OUTPATIENT)
Dept: EMERGENCY DEPT | Age: 38
End: 2018-10-11
Payer: COMMERCIAL

## 2018-10-11 ENCOUNTER — HOSPITAL ENCOUNTER (OUTPATIENT)
Age: 38
Setting detail: SURGERY ADMIT
Discharge: HOME | End: 2018-10-11
Attending: EMERGENCY MEDICINE
Payer: COMMERCIAL

## 2018-10-11 ENCOUNTER — APPOINTMENT (OUTPATIENT)
Dept: PHYSICAL THERAPY | Age: 38
End: 2018-10-11
Payer: COMMERCIAL

## 2018-10-11 ENCOUNTER — ANESTHESIA (OUTPATIENT)
Dept: EMERGENCY DEPT | Age: 38
End: 2018-10-11
Payer: COMMERCIAL

## 2018-10-11 VITALS
WEIGHT: 278 LBS | BODY MASS INDEX: 41.18 KG/M2 | HEART RATE: 86 BPM | RESPIRATION RATE: 16 BRPM | DIASTOLIC BLOOD PRESSURE: 73 MMHG | SYSTOLIC BLOOD PRESSURE: 123 MMHG | OXYGEN SATURATION: 100 % | HEIGHT: 69 IN | TEMPERATURE: 98 F

## 2018-10-11 DIAGNOSIS — R51.9 ACUTE INTRACTABLE HEADACHE, UNSPECIFIED HEADACHE TYPE: Primary | ICD-10-CM

## 2018-10-11 PROCEDURE — 99285 EMERGENCY DEPT VISIT HI MDM: CPT

## 2018-10-11 PROCEDURE — 62273 INJECT EPIDURAL PATCH: CPT | Performed by: ANESTHESIOLOGY

## 2018-10-11 PROCEDURE — 6360000002 HC RX W HCPCS: Performed by: EMERGENCY MEDICINE

## 2018-10-11 PROCEDURE — 96375 TX/PRO/DX INJ NEW DRUG ADDON: CPT

## 2018-10-11 PROCEDURE — 96374 THER/PROPH/DIAG INJ IV PUSH: CPT

## 2018-10-11 RX ORDER — DIPHENHYDRAMINE HYDROCHLORIDE 50 MG/ML
12.5 INJECTION INTRAMUSCULAR; INTRAVENOUS EVERY 6 HOURS PRN
Status: DISCONTINUED | OUTPATIENT
Start: 2018-10-11 | End: 2018-10-25 | Stop reason: HOSPADM

## 2018-10-11 RX ADMIN — PROCHLORPERAZINE EDISYLATE 10 MG: 5 INJECTION INTRAMUSCULAR; INTRAVENOUS at 13:38

## 2018-10-11 RX ADMIN — DIPHENHYDRAMINE HYDROCHLORIDE 12.5 MG: 50 INJECTION, SOLUTION INTRAMUSCULAR; INTRAVENOUS at 13:38

## 2018-10-11 ASSESSMENT — ENCOUNTER SYMPTOMS
ABDOMINAL PAIN: 0
RHINORRHEA: 0
SHORTNESS OF BREATH: 0
WHEEZING: 0
EYE PAIN: 0
TROUBLE SWALLOWING: 0
VOMITING: 0
CHEST TIGHTNESS: 0
COUGH: 0
SORE THROAT: 0
DIARRHEA: 0
STRIDOR: 0
PHOTOPHOBIA: 1
BACK PAIN: 0

## 2018-10-11 ASSESSMENT — PAIN SCALES - GENERAL
PAINLEVEL_OUTOF10: 0
PAINLEVEL_OUTOF10: 0
PAINLEVEL_OUTOF10: 4

## 2018-10-11 ASSESSMENT — PAIN DESCRIPTION - LOCATION: LOCATION: HEAD

## 2018-10-11 ASSESSMENT — PAIN DESCRIPTION - PAIN TYPE: TYPE: ACUTE PAIN

## 2018-10-11 NOTE — ED PROVIDER NOTES
(electronically signed)  Attending Emergency Physician            Jhoana Germain MD  10/12/18 1765       Jhoana Germain MD  10/12/18 4911       Jhoana Germain MD  10/12/18 3056

## 2018-10-15 ENCOUNTER — HOSPITAL ENCOUNTER (OUTPATIENT)
Dept: PHYSICAL THERAPY | Age: 38
Setting detail: THERAPIES SERIES
Discharge: HOME | End: 2018-10-15
Payer: COMMERCIAL

## 2018-10-15 PROCEDURE — 97033 APP MDLTY 1+IONTPHRSIS EA 15: CPT

## 2018-10-15 PROCEDURE — 97112 NEUROMUSCULAR REEDUCATION: CPT

## 2018-10-15 PROCEDURE — 97140 MANUAL THERAPY 1/> REGIONS: CPT

## 2018-10-15 PROCEDURE — 97110 THERAPEUTIC EXERCISES: CPT

## 2018-10-15 NOTE — FLOWSHEET NOTE
Functional Deficits.     Long Term Goals: To be achieved in: 12 weeks  1. Disability index score of 0% or less for the LEFS to assist with reaching prior level of function. 2. Patient will demonstrate increased AROM to allow for proper joint functioning as indicated by patients Functional Deficits. 3. Patient will demonstrate an increase in Strength to good proximal hip strength and control, within 5lb HHD in LE to allow for proper functional mobility as indicated by patients Functional Deficits. 4. Patient will return to all functional activities without increased symptoms or restriction. 5. Pt will demonstrate the ability to walk 10 miles with 0/10 pain. Progression Towards Functional goals:  [x] Patient is progressing as expected towards functional goals listed. [] Progression is slowed due to complexities listed. [] Progression has been slowed due to co-morbidities. [] Plan just implemented, too soon to assess goals progression  [] Other:     ASSESSMENT:  Continued good tolerance of DN modality. Pt with good challenge/tolerance of exercises/stretches as above. Will progress as appropriate, will continue to monitor symptoms/improvement session to session.       Treatment/Activity Tolerance:  [x] Patient tolerated treatment well [] Patient limited by fatique  [] Patient limited by pain  [] Patient limited by other medical complications  [] Other:     Prognosis: [x] Good [] Fair  [] Poor    Patient Requires Follow-up: [x] Yes  [] No    PLAN: See eval  [x] Continue per plan of care [] Alter current plan (see comments)  [] Plan of care initiated [] Hold pending MD visit [] Discharge    Electronically signed by: Yumiko Stewart PT,DPT

## 2018-10-23 ENCOUNTER — HOSPITAL ENCOUNTER (OUTPATIENT)
Dept: PHYSICAL THERAPY | Age: 38
Setting detail: THERAPIES SERIES
Discharge: HOME | End: 2018-10-23
Payer: COMMERCIAL

## 2018-10-23 PROCEDURE — 97112 NEUROMUSCULAR REEDUCATION: CPT

## 2018-10-23 PROCEDURE — 97110 THERAPEUTIC EXERCISES: CPT

## 2018-10-23 PROCEDURE — 97140 MANUAL THERAPY 1/> REGIONS: CPT

## 2018-10-30 ENCOUNTER — APPOINTMENT (OUTPATIENT)
Dept: PHYSICAL THERAPY | Age: 38
End: 2018-10-30
Payer: COMMERCIAL

## 2018-10-30 ENCOUNTER — HOSPITAL ENCOUNTER (OUTPATIENT)
Dept: PHYSICAL THERAPY | Age: 38
Setting detail: THERAPIES SERIES
Discharge: HOME | End: 2018-10-30
Payer: COMMERCIAL

## 2018-10-30 PROCEDURE — 97033 APP MDLTY 1+IONTPHRSIS EA 15: CPT

## 2018-10-30 PROCEDURE — 97140 MANUAL THERAPY 1/> REGIONS: CPT

## 2018-10-30 PROCEDURE — 97110 THERAPEUTIC EXERCISES: CPT

## 2018-10-30 PROCEDURE — 97112 NEUROMUSCULAR REEDUCATION: CPT

## 2018-10-30 NOTE — FLOWSHEET NOTE
[facility] [facility]    Physical Therapy Daily Treatment Note  Date:  10/30/2018    Patient Name:  Riley Tapia    :  1980  MRN: [id number]  Restrictions/Precautions:    Medical/Treatment Diagnosis Information:  · Diagnosis: M76.52 (ICD-10-CM) - Patellar tendinitis of left knee  · Treatment Diagnosis: L knee pain  Insurance/Certification information:  PT Insurance Information: [employer]  $0CP  80/20  30 PT NO AUTH REQ  Physician Information:  Referring Practitioner: Dr. Cielo Segura of care signed (Y/N): Y    Date of Patient follow up with Physician:     G-Code (if applicable):      Date G-Code Applied:         Progress Note: []  Yes  [x]  No  Next due by: Visit #10       Latex Allergy:  [x]NO      []YES  Preferred Language for Healthcare:   [x]English       []other:    Visit # Insurance Allowable   6 30  NO AUTH REQ     Pain level:  0/10     SUBJECTIVE:  Pt reports her pain has been subsiding. OBJECTIVE:   Observation:   Test measurements:      RESTRICTIONS/PRECAUTIONS: None    Exercises/Interventions:     Dry needling manual therapy: consisted on the placement of 5 needles in the following muscles:  vastus lateralis, vastus intermedius/rectus, vastus medialis, gastrocnemius. A 60 mm needle was inserted, piston, rotated, and coned to produce intramuscular mobilization. These techniques were used to restore functional range of motion, reduce muscle spasm and induce healing in the corresponding musculature. (75506)  Clean Technique was utilized today while applying Dry needling treatment. The treatment sites where cleaned with 70% solution of  isopropyl alcohol . The PT washed their hands and utilized treatment gloves along with hand  prior to inserting the needles. All needles where removed and discarded in the appropriate sharps container. Electrical Stimulation for neuromuscular re-education of vastus medialis.  Waveform: [place] Pulse;

## 2018-11-06 ENCOUNTER — HOSPITAL ENCOUNTER (OUTPATIENT)
Dept: PHYSICAL THERAPY | Age: 38
Setting detail: THERAPIES SERIES
Discharge: HOME | End: 2018-11-06
Payer: COMMERCIAL

## 2018-11-06 PROCEDURE — 97110 THERAPEUTIC EXERCISES: CPT

## 2018-11-06 PROCEDURE — 97033 APP MDLTY 1+IONTPHRSIS EA 15: CPT

## 2018-11-06 PROCEDURE — 97140 MANUAL THERAPY 1/> REGIONS: CPT

## 2018-11-09 ENCOUNTER — OFFICE VISIT (OUTPATIENT)
Dept: FAMILY MEDICINE CLINIC | Age: 38
End: 2018-11-09
Payer: COMMERCIAL

## 2018-11-09 VITALS
DIASTOLIC BLOOD PRESSURE: 80 MMHG | WEIGHT: 282 LBS | BODY MASS INDEX: 41.64 KG/M2 | HEART RATE: 61 BPM | SYSTOLIC BLOOD PRESSURE: 134 MMHG | OXYGEN SATURATION: 99 %

## 2018-11-09 DIAGNOSIS — Z23 NEED FOR INFLUENZA VACCINATION: ICD-10-CM

## 2018-11-09 DIAGNOSIS — R00.1 BRADYCARDIA: ICD-10-CM

## 2018-11-09 DIAGNOSIS — F32.A ANXIETY AND DEPRESSION: Primary | ICD-10-CM

## 2018-11-09 DIAGNOSIS — F41.9 ANXIETY AND DEPRESSION: Primary | ICD-10-CM

## 2018-11-09 PROCEDURE — 90471 IMMUNIZATION ADMIN: CPT | Performed by: FAMILY MEDICINE

## 2018-11-09 PROCEDURE — 90686 IIV4 VACC NO PRSV 0.5 ML IM: CPT | Performed by: FAMILY MEDICINE

## 2018-11-09 PROCEDURE — 93000 ELECTROCARDIOGRAM COMPLETE: CPT | Performed by: FAMILY MEDICINE

## 2018-11-09 PROCEDURE — G0444 DEPRESSION SCREEN ANNUAL: HCPCS | Performed by: FAMILY MEDICINE

## 2018-11-09 PROCEDURE — 99214 OFFICE O/P EST MOD 30 MIN: CPT | Performed by: FAMILY MEDICINE

## 2018-11-09 RX ORDER — ESCITALOPRAM OXALATE 10 MG/1
10 TABLET ORAL DAILY
Qty: 30 TABLET | Refills: 0 | Status: SHIPPED | OUTPATIENT
Start: 2018-11-09 | End: 2018-11-29 | Stop reason: SDUPTHER

## 2018-11-09 ASSESSMENT — PATIENT HEALTH QUESTIONNAIRE - PHQ9
8. MOVING OR SPEAKING SO SLOWLY THAT OTHER PEOPLE COULD HAVE NOTICED. OR THE OPPOSITE, BEING SO FIGETY OR RESTLESS THAT YOU HAVE BEEN MOVING AROUND A LOT MORE THAN USUAL: 0
2. FEELING DOWN, DEPRESSED OR HOPELESS: 1
7. TROUBLE CONCENTRATING ON THINGS, SUCH AS READING THE NEWSPAPER OR WATCHING TELEVISION: 2
5. POOR APPETITE OR OVEREATING: 1
9. THOUGHTS THAT YOU WOULD BE BETTER OFF DEAD, OR OF HURTING YOURSELF: 0
SUM OF ALL RESPONSES TO PHQ QUESTIONS 1-9: 9
4. FEELING TIRED OR HAVING LITTLE ENERGY: 1
10. IF YOU CHECKED OFF ANY PROBLEMS, HOW DIFFICULT HAVE THESE PROBLEMS MADE IT FOR YOU TO DO YOUR WORK, TAKE CARE OF THINGS AT HOME, OR GET ALONG WITH OTHER PEOPLE: 1
6. FEELING BAD ABOUT YOURSELF - OR THAT YOU ARE A FAILURE OR HAVE LET YOURSELF OR YOUR FAMILY DOWN: 1
SUM OF ALL RESPONSES TO PHQ QUESTIONS 1-9: 9
SUM OF ALL RESPONSES TO PHQ9 QUESTIONS 1 & 2: 3
3. TROUBLE FALLING OR STAYING ASLEEP: 1
1. LITTLE INTEREST OR PLEASURE IN DOING THINGS: 2

## 2018-11-09 NOTE — PROGRESS NOTES
Chief Complaint   Patient presents with    Depression    Other     flying out next week. wants to talk about the risk of DVTs    Anxiety         HPI      41 y.o. female presents today with above mentioned complaints. She has concerns regarding anxiety and depression. Has family history of depression in: father, brother and sister. Has also been having some stress at work. Has been having panic attacks: sweaty palms, feels sick to the stomach. Denies any SI/HI. Open to counseling. Traveling to [place] next week doing the [employer] 3 day walk. Then flying out to [place]. She is concerned regarding her risk for DVT. No hx or FH of DVT. Had a friend who  from DVT after long travel. So she is concerned. She also has noted that her resting HR would be in the 50's and 60's.  Denies any chest pain, sob, dizziness or LH  Patient Active Problem List   Diagnosis    Tenosynovitis of right ankle    Generalized idiopathic epilepsy, not intractable, without status epilepticus ([address]    Pseudotumor cerebri syndrome    Patellar tendinitis of left knee     Past Medical History:   Diagnosis Date    Patellar tendinitis of left knee     Seizures ([address]     last epileptic episode was 12 years ago       Past Surgical History:   Procedure Laterality Date    KNEE CARTILAGE SURGERY      WRIST SURGERY      Bone spur removal     Most Recent Immunizations   Administered Date(s) Administered    Influenza Virus Vaccine 2017    Influenza, Angela Massy, 3 Years and older, IM (Fluzone 3 yrs and older or Afluria 5 yrs and older) 2017    Influenza, Angela Massy, 3 yrs and older, IM, PF (Fluzone 3 yrs and older or Afluria 5 yrs and older) 2018    Tdap (Boostrix, Adacel) 2017        Current Outpatient Prescriptions   Medication Sig Dispense Refill    escitalopram (LEXAPRO) 10 MG tablet Take 1 tablet by mouth daily 30 tablet 0    dexamethasone (DECADRON) 4 MG/ML injection Dose as directed per physical therapy 30

## 2018-11-09 NOTE — PATIENT INSTRUCTIONS
tranylcypromine. Some medicines can interact with escitalopram and cause a serious condition called serotonin syndrome. Be sure your doctor knows if you also take stimulant medicine, opioid medicine, herbal products, or medicine for depression, mental illness, Parkinson's disease, migraine headaches, serious infections, or prevention of nausea and vomiting. Ask your doctor before making any changes in how or when you take your medications. To make sure escitalopram is safe for you, tell your doctor if you have ever had:  · liver or kidney disease;  · seizures;  · low levels of sodium in your blood;  · heart disease, high blood pressure;  · a stroke;  · a bleeding or blood clotting disorder;  · bipolar disorder (manic depression); or  · drug addiction or suicidal thoughts. Some young people have thoughts about suicide when first taking an antidepressant. Your doctor should check your progress at regular visits. Your family or other caregivers should also be alert to changes in your mood or symptoms. Taking an SSRI antidepressant during pregnancy may cause serious lung problems or other complications in the baby. However, you may have a relapse of depression if you stop taking your antidepressant. Tell your doctor right away if you become pregnant while taking escitalopram. Do not start or stop taking this medicine during pregnancy without your doctor's advice. Escitalopram can pass into breast milk and may harm a nursing baby. Tell your doctor if you are breast-feeding a baby. Escitalopram should not be given to a child younger than 15years old. How should I take escitalopram?  Follow all directions on your prescription label. Your doctor may occasionally change your dose. Do not take this medicine in larger or smaller amounts or for longer than recommended. You may take escitalopram with or without food. Try to take the medicine at the same time each day.   Measure liquid medicine with the dosing syringe provided, or with a special dose-measuring spoon or medicine cup. If you do not have a dose-measuring device, ask your pharmacist for one. It may take up to 4 weeks before your symptoms improve. Keep using the medication as directed and tell your doctor if your symptoms do not improve. Do not stop using escitalopram suddenly, or you could have unpleasant withdrawal symptoms. Follow your doctor's instructions about tapering your dose. Store at room temperature away from moisture and heat. What happens if I miss a dose? Take the missed dose as soon as you remember. Skip the missed dose if it is almost time for your next scheduled dose. Do not take extra medicine to make up the missed dose. What happens if I overdose? Seek emergency medical attention or call the Poison Help line at 1-322.587.6294. What should I avoid while taking escitalopram?  Ask your doctor before taking a nonsteroidal anti-inflammatory drug (NSAID) for pain, arthritis, fever, or swelling. This includes aspirin, ibuprofen (Advil, Motrin), naproxen (Aleve), celecoxib (Celebrex), diclofenac, indomethacin, meloxicam, and others. Using an NSAID with escitalopram may cause you to bruise or bleed easily. Drinking alcohol with this medicine can cause side effects. Escitalopram may impair your thinking or reactions. Be careful if you drive or do anything that requires you to be alert. What are the possible side effects of escitalopram?  Get emergency medical help if you have signs of an allergic reaction: skin rash or hives; difficulty breathing; swelling of your face, lips, tongue, or throat. Report any new or worsening symptoms to your doctor, such as: mood or behavior changes, anxiety, panic attacks, trouble sleeping, or if you feel impulsive, irritable, agitated, hostile, aggressive, restless, hyperactive (mentally or physically), more depressed, or have thoughts about suicide or hurting yourself.   Call your doctor at once if you have:  · blurred vision, tunnel vision, eye pain or swelling, or seeing halos around lights;  · racing thoughts, unusual risk-taking behavior, feelings of extreme happiness or sadness;  · low levels of sodium in the body --headache, confusion, slurred speech, severe weakness, vomiting, loss of coordination, feeling unsteady; or  · severe nervous system reaction --very stiff (rigid) muscles, high fever, sweating, confusion, fast or uneven heartbeats, tremors, feeling like you might pass out. Seek medical attention right away if you have symptoms of serotonin syndrome, such as: agitation, hallucinations, fever, sweating, shivering, fast heart rate, muscle stiffness, twitching, loss of coordination, nausea, vomiting, or diarrhea. Common side effects may include:  · dizziness, drowsiness, weakness;  · sweating, feeling shaky or anxious;  · sleep problems (insomnia);  · dry mouth, loss of appetite;  · nausea, constipation;  · yawning;  · weight changes; or  · decreased sex drive, impotence, or difficulty having an orgasm. This is not a complete list of side effects and others may occur. Call your doctor for medical advice about side effects. You may report side effects to FDA at 5-036-FDA-1739. What other drugs will affect escitalopram?  Taking escitalopram with other drugs that make you sleepy can worsen this effect. Ask your doctor before taking a sleeping pill, narcotic medication, muscle relaxer, or medicine for anxiety, depression, or seizures.   Tell your doctor about all medicines you use, and those you start or stop using during your treatment with escitalopram, especially:  · any other antidepressant;  · medicine to treat anxiety, mood disorders, or mental illness;  · lithium, Kush's wort, tramadol, or tryptophan (sometimes called L-tryptophan);  · a blood thinner --warfarin, Coumadin, Jantoven;  · migraine headache medication --sumatriptan, rizatriptan, and others;  · narcotic pain medication --fentanyl adverse effects. If you have questions about the drugs you are taking, check with your doctor, nurse or pharmacist.  Copyright 5640-1237 [address]. Version: 16.01. Revision date: 7/19/2017. Care instructions adapted under license by [facility] [address] If you have questions about a medical condition or this instruction, always ask your healthcare professional. [place] [address] any warranty or liability for your use of this information.

## 2018-11-11 ASSESSMENT — ENCOUNTER SYMPTOMS: SHORTNESS OF BREATH: 0

## 2018-11-13 ENCOUNTER — HOSPITAL ENCOUNTER (OUTPATIENT)
Dept: PHYSICAL THERAPY | Age: 38
Setting detail: THERAPIES SERIES
Discharge: HOME | End: 2018-11-13
Payer: COMMERCIAL

## 2018-11-13 PROCEDURE — 97110 THERAPEUTIC EXERCISES: CPT

## 2018-11-13 PROCEDURE — 97033 APP MDLTY 1+IONTPHRSIS EA 15: CPT

## 2018-11-13 PROCEDURE — 97140 MANUAL THERAPY 1/> REGIONS: CPT

## 2018-11-29 ENCOUNTER — TELEPHONE (OUTPATIENT)
Dept: FAMILY MEDICINE CLINIC | Age: 38
End: 2018-11-29

## 2018-11-29 DIAGNOSIS — F41.9 ANXIETY AND DEPRESSION: ICD-10-CM

## 2018-11-29 DIAGNOSIS — F32.A ANXIETY AND DEPRESSION: ICD-10-CM

## 2018-11-29 RX ORDER — ESCITALOPRAM OXALATE 10 MG/1
10 TABLET ORAL DAILY
Qty: 30 TABLET | Refills: 2 | Status: SHIPPED | OUTPATIENT
Start: 2018-11-29 | End: 2019-01-09 | Stop reason: SDUPTHER

## 2018-11-29 NOTE — TELEPHONE ENCOUNTER
Pt said she lost her lexapro at the airport when leaving from a trip and wants to see if Dr. Bibi Yates would send more in for her? Pt said she looked for it but was not able to find it. Please notify pt if she can get this filled.

## 2018-12-04 ENCOUNTER — HOSPITAL ENCOUNTER (OUTPATIENT)
Dept: PHYSICAL THERAPY | Age: 38
Setting detail: THERAPIES SERIES
Discharge: HOME | End: 2018-12-04
Payer: COMMERCIAL

## 2018-12-04 PROCEDURE — G8978 MOBILITY CURRENT STATUS: HCPCS

## 2018-12-04 PROCEDURE — 97110 THERAPEUTIC EXERCISES: CPT

## 2018-12-04 PROCEDURE — G8980 MOBILITY D/C STATUS: HCPCS

## 2018-12-04 PROCEDURE — 97112 NEUROMUSCULAR REEDUCATION: CPT

## 2018-12-04 PROCEDURE — G8979 MOBILITY GOAL STATUS: HCPCS

## 2018-12-04 NOTE — DISCHARGE SUMMARY
downs 4'' 3 10     JACOB hip ABD 3 10 80#                         Pt Education: Pt educated on POC and HEP. 5'  Discussed DN modality (side effects, outcomes, etc). Reviewed activity return trial (i.e. Walking). Manual Intervention       DN 10'      STM to GS and quad following DN 5'                                  NMR re-education       Vastus medialis NMES for patellar tracking (as above) 5'                                                                Therapeutic Exercise and NMR EXR  [x] (47185) Provided verbal/tactile cueing for activities related to strengthening, flexibility, endurance, ROM for improvements in LE, proximal hip, and core control with self care, mobility, lifting, ambulation.  [] (28658) Provided verbal/tactile cueing for activities related to improving balance, coordination, kinesthetic sense, posture, motor skill, proprioception  to assist with LE, proximal hip, and core control in self care, mobility, lifting, ambulation and eccentric single leg control.      NMR and Therapeutic Activities:    [x] (70319 or 69359) Provided verbal/tactile cueing for activities related to improving balance, coordination, kinesthetic sense, posture, motor skill, proprioception and motor activation to allow for proper function of core, proximal hip and LE with self care and ADLs  [] (12682) Gait Re-education- Provided training and instruction to the patient for proper LE, core and proximal hip recruitment and positioning and eccentric body weight control with ambulation re-education including up and down stairs     Home Exercise Program:    [x] (24935) Reviewed/Progressed HEP activities related to strengthening, flexibility, endurance, ROM of core, proximal hip and LE for functional self-care, mobility, lifting and ambulation/stair navigation   [] (65902)Reviewed/Progressed HEP activities related to improving balance, coordination, kinesthetic sense, posture, motor skill, proprioception of core, proximal hip

## 2018-12-07 ENCOUNTER — OFFICE VISIT (OUTPATIENT)
Dept: FAMILY MEDICINE CLINIC | Age: 38
End: 2018-12-07
Payer: COMMERCIAL

## 2018-12-07 VITALS
SYSTOLIC BLOOD PRESSURE: 118 MMHG | DIASTOLIC BLOOD PRESSURE: 76 MMHG | WEIGHT: 283 LBS | BODY MASS INDEX: 41.79 KG/M2 | OXYGEN SATURATION: 99 % | HEART RATE: 59 BPM

## 2018-12-07 DIAGNOSIS — F41.9 ANXIETY AND DEPRESSION: Primary | ICD-10-CM

## 2018-12-07 DIAGNOSIS — F32.A ANXIETY AND DEPRESSION: Primary | ICD-10-CM

## 2018-12-07 PROCEDURE — 99213 OFFICE O/P EST LOW 20 MIN: CPT | Performed by: FAMILY MEDICINE

## 2018-12-07 NOTE — PROGRESS NOTES
Chief Complaint   Patient presents with    1 Month Follow-Up     started lexapro about a month ago. doing well         HPI      41 y.o. female presents today for follow-up. History of anxiety depression and started on Lexapro 10 mg at last visit. She reports compliance without medication side effects. She reports that she feels well with it. Denies any SI/HI. She just returned from a trip to [facility] in which she was participating in 2 different marathons. Patient Active Problem List   Diagnosis    Tenosynovitis of right ankle    Generalized idiopathic epilepsy, not intractable, without status epilepticus ([address]    Pseudotumor cerebri syndrome    Patellar tendinitis of left knee    Anxiety and depression     Past Medical History:   Diagnosis Date    Anxiety and depression 12/7/2018    Patellar tendinitis of left knee     Seizures ([address]     last epileptic episode was 12 years ago       Past Surgical History:   Procedure Laterality Date    KNEE CARTILAGE SURGERY      WRIST SURGERY      Bone spur removal     Most Recent Immunizations   Administered Date(s) Administered    Influenza Virus Vaccine 11/09/2017    Influenza, Vennie Melisa, 3 Years and older, IM (Fluzone 3 yrs and older or Afluria 5 yrs and older) 11/09/2017    Influenza, Vennie Melisa, 3 yrs and older, IM, PF (Fluzone 3 yrs and older or Afluria 5 yrs and older) 11/09/2018    Tdap (Boostrix, Adacel) 12/19/2017        Current Outpatient Prescriptions   Medication Sig Dispense Refill    escitalopram (LEXAPRO) 10 MG tablet Take 1 tablet by mouth daily 30 tablet 2    dexamethasone (DECADRON) 4 MG/ML injection Dose as directed per physical therapy 30 mL 0     No current facility-administered medications for this visit.       No Known Allergies    Social History     Social History    Marital status: Single     Spouse name: N/A    Number of children: N/A    Years of education: N/A     Social History Main Topics    Smoking status: Never Smoker    Smokeless

## 2019-01-07 ENCOUNTER — OFFICE VISIT (OUTPATIENT)
Dept: PSYCHOLOGY | Age: 39
End: 2019-01-07
Payer: COMMERCIAL

## 2019-01-07 DIAGNOSIS — F32.A DEPRESSION, UNSPECIFIED DEPRESSION TYPE: Primary | ICD-10-CM

## 2019-01-07 DIAGNOSIS — F32.A ANXIETY AND DEPRESSION: ICD-10-CM

## 2019-01-07 DIAGNOSIS — F41.9 ANXIETY: ICD-10-CM

## 2019-01-07 DIAGNOSIS — F41.9 ANXIETY AND DEPRESSION: ICD-10-CM

## 2019-01-07 PROCEDURE — 90791 PSYCH DIAGNOSTIC EVALUATION: CPT | Performed by: PSYCHOLOGIST

## 2019-01-07 ASSESSMENT — PATIENT HEALTH QUESTIONNAIRE - PHQ9
5. POOR APPETITE OR OVEREATING: 3
1. LITTLE INTEREST OR PLEASURE IN DOING THINGS: 3
4. FEELING TIRED OR HAVING LITTLE ENERGY: 1
SUM OF ALL RESPONSES TO PHQ QUESTIONS 1-9: 15
SUM OF ALL RESPONSES TO PHQ9 QUESTIONS 1 & 2: 5
7. TROUBLE CONCENTRATING ON THINGS, SUCH AS READING THE NEWSPAPER OR WATCHING TELEVISION: 1
8. MOVING OR SPEAKING SO SLOWLY THAT OTHER PEOPLE COULD HAVE NOTICED. OR THE OPPOSITE, BEING SO FIGETY OR RESTLESS THAT YOU HAVE BEEN MOVING AROUND A LOT MORE THAN USUAL: 1
2. FEELING DOWN, DEPRESSED OR HOPELESS: 2
SUM OF ALL RESPONSES TO PHQ QUESTIONS 1-9: 15
9. THOUGHTS THAT YOU WOULD BE BETTER OFF DEAD, OR OF HURTING YOURSELF: 1
3. TROUBLE FALLING OR STAYING ASLEEP: 1
6. FEELING BAD ABOUT YOURSELF - OR THAT YOU ARE A FAILURE OR HAVE LET YOURSELF OR YOUR FAMILY DOWN: 2

## 2019-01-07 ASSESSMENT — ANXIETY QUESTIONNAIRES
1. FEELING NERVOUS, ANXIOUS, OR ON EDGE: 3-NEARLY EVERY DAY
4. TROUBLE RELAXING: 3-NEARLY EVERY DAY
2. NOT BEING ABLE TO STOP OR CONTROL WORRYING: 2-OVER HALF THE DAYS
5. BEING SO RESTLESS THAT IT IS HARD TO SIT STILL: 0-NOT AT ALL SURE
6. BECOMING EASILY ANNOYED OR IRRITABLE: 1-SEVERAL DAYS
7. FEELING AFRAID AS IF SOMETHING AWFUL MIGHT HAPPEN: 1-SEVERAL DAYS
3. WORRYING TOO MUCH ABOUT DIFFERENT THINGS: 2-OVER HALF THE DAYS
GAD7 TOTAL SCORE: 12

## 2019-01-09 RX ORDER — ESCITALOPRAM OXALATE 20 MG/1
20 TABLET ORAL DAILY
Qty: 90 TABLET | Refills: 1 | Status: SHIPPED | OUTPATIENT
Start: 2019-01-09 | End: 2019-06-14

## 2019-01-28 ENCOUNTER — OFFICE VISIT (OUTPATIENT)
Dept: PSYCHOLOGY | Age: 39
End: 2019-01-28
Payer: COMMERCIAL

## 2019-01-28 DIAGNOSIS — F32.A DEPRESSION, UNSPECIFIED DEPRESSION TYPE: ICD-10-CM

## 2019-01-28 DIAGNOSIS — F41.9 ANXIETY: Primary | ICD-10-CM

## 2019-01-28 PROCEDURE — 90832 PSYTX W PT 30 MINUTES: CPT | Performed by: PSYCHOLOGIST

## 2019-02-06 ENCOUNTER — TELEPHONE (OUTPATIENT)
Dept: PSYCHOLOGY | Age: 39
End: 2019-02-06

## 2019-02-06 ENCOUNTER — NURSE ONLY (OUTPATIENT)
Dept: FAMILY MEDICINE CLINIC | Age: 39
End: 2019-02-06

## 2019-02-06 DIAGNOSIS — F32.A ANXIETY AND DEPRESSION: Primary | ICD-10-CM

## 2019-02-06 DIAGNOSIS — F41.9 ANXIETY AND DEPRESSION: Primary | ICD-10-CM

## 2019-02-06 DIAGNOSIS — F41.9 ANXIETY AND DEPRESSION: ICD-10-CM

## 2019-02-06 DIAGNOSIS — F32.A ANXIETY AND DEPRESSION: ICD-10-CM

## 2019-02-06 PROCEDURE — 36415 COLL VENOUS BLD VENIPUNCTURE: CPT | Performed by: FAMILY MEDICINE

## 2019-02-07 LAB
MAGNESIUM: 2 MG/DL (ref 1.8–2.4)
TSH REFLEX FT4: 1.4 UIU/ML (ref 0.27–4.2)
VITAMIN D 25-HYDROXY: 20.1 NG/ML

## 2019-02-18 ENCOUNTER — OFFICE VISIT (OUTPATIENT)
Dept: PSYCHOLOGY | Age: 39
End: 2019-02-18
Payer: COMMERCIAL

## 2019-02-18 DIAGNOSIS — F41.9 ANXIETY: ICD-10-CM

## 2019-02-18 DIAGNOSIS — F32.A DEPRESSION, UNSPECIFIED DEPRESSION TYPE: Primary | ICD-10-CM

## 2019-02-18 PROCEDURE — 90832 PSYTX W PT 30 MINUTES: CPT | Performed by: PSYCHOLOGIST

## 2019-03-08 ENCOUNTER — OFFICE VISIT (OUTPATIENT)
Dept: FAMILY MEDICINE CLINIC | Age: 39
End: 2019-03-08
Payer: COMMERCIAL

## 2019-03-08 VITALS
DIASTOLIC BLOOD PRESSURE: 72 MMHG | SYSTOLIC BLOOD PRESSURE: 120 MMHG | HEART RATE: 120 BPM | BODY MASS INDEX: 42.23 KG/M2 | WEIGHT: 286 LBS | RESPIRATION RATE: 16 BRPM | OXYGEN SATURATION: 96 %

## 2019-03-08 DIAGNOSIS — F32.A ANXIETY AND DEPRESSION: Primary | ICD-10-CM

## 2019-03-08 DIAGNOSIS — F41.9 ANXIETY AND DEPRESSION: Primary | ICD-10-CM

## 2019-03-08 PROCEDURE — 99213 OFFICE O/P EST LOW 20 MIN: CPT | Performed by: FAMILY MEDICINE

## 2019-04-15 ENCOUNTER — TELEPHONE (OUTPATIENT)
Dept: ORTHOPEDIC SURGERY | Age: 39
End: 2019-04-15

## 2019-04-15 DIAGNOSIS — M25.569 KNEE PAIN, UNSPECIFIED CHRONICITY, UNSPECIFIED LATERALITY: Primary | ICD-10-CM

## 2019-04-15 NOTE — TELEPHONE ENCOUNTER
CALLED PATIENT TO SPEAK TO HER ABOUT HER CONCERNS SHE SHARED WITH US VIA [email or web address] ABOUT HER KNEE. SHE IS STATING SHE WOULD LIKE AN MRI DUE TO HER THINKING ITS MORE THEN JUST PATELLAR TENDONITIS. I LM WITH THE PATIENT STATING TO GET AN MRI APPROVED SHE WOULD NEED TO BE SEEN IN OFFICE AGAIN AND POTENTIALLY MORE XRAY COMPLETED.  [facility] AND GET AN NIRMAL

## 2019-04-18 ENCOUNTER — OFFICE VISIT (OUTPATIENT)
Dept: ORTHOPEDIC SURGERY | Age: 39
End: 2019-04-18
Payer: COMMERCIAL

## 2019-04-18 VITALS — BODY MASS INDEX: 42.35 KG/M2 | WEIGHT: 285.94 LBS | HEIGHT: 69 IN

## 2019-04-18 DIAGNOSIS — M22.2X2 PATELLOFEMORAL PAIN SYNDROME OF LEFT KNEE: ICD-10-CM

## 2019-04-18 DIAGNOSIS — S83.249A TEAR OF MEDIAL MENISCUS OF KNEE, CURRENT, UNSPECIFIED LATERALITY, UNSPECIFIED TEAR TYPE, INITIAL ENCOUNTER: Primary | ICD-10-CM

## 2019-04-18 PROCEDURE — 99213 OFFICE O/P EST LOW 20 MIN: CPT | Performed by: ORTHOPAEDIC SURGERY

## 2019-04-18 NOTE — PROGRESS NOTES
Chief Complaint:  Knee Pain (f/U LEFT KNEE PAIN)      SUBJECTIVE:  Zuri Dimas is a 41 y.o. female who returns today for evaluation of left knee, I saw her back in the fall diagnosed with patella tendinitis, she did a course of physical therapy with minimal improvement. 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 She has now noticed increased pain medially and anterior, currently 5 out of 10 dull aching in nature. Worse going up stairs and sitting with a straight leg. Pain Assessment:  Pain Assessment  Location of Pain: Knee  Location Modifiers: Left  Severity of Pain: 5  Quality of Pain: Throbbing, Aching  Duration of Pain: Persistent  Frequency of Pain: Intermittent  Aggravating Factors: Bending, Stairs(resistance, getting up)  Limiting Behavior: Yes  Relieving Factors: Rest, Nsaids  Result of Injury: No  Work-Related Injury: No  Are there other pain locations you wish to document?: No      Medical History:  Patient's medications, allergies, past medical, surgical, social and family histories were reviewed and updated as appropriate. Review of Systems:  Constitutional: negative  Respiratory: negative  Cardiovascular: negative  Musculoskeletal:negative except for Knee Pain (f/U LEFT KNEE PAIN)    Relevant review of systems reviewed and available in the patient's chart in media tab    General Exam:   Constitutional: Patient is adequately groomed with no evidence of malnutrition  Mental Status: The patient is oriented to time, place and person. The patient's mood and affect are appropriate. Vascular: Examination reveals no swelling or calf tenderness. Peripheral pulses are palpable and 2+. OBJECTIVE:  Vital Signs:  Vitals:    04/18/19 0828   Weight: 285 lb 15 oz (129.7 kg)   Height: 5' 9.02\" (1.753 m)       Appearance: alert, well appearing, and in no distress, oriented to person, place, and time and overweight.     Physical exam:   No effusion to the left knee, she does have crepitus under the patella with range of motion and a positive grind test, she has tenderness to palpation along the patella tendon and medial joint line, tenderness with Jacky's. ACL, MCL, PCL and LCL are stable with stress exam.  Tolerates full range of motion 0-130°. Able to perform a straight leg raise. 5 out of 5 flexion and extension strength. Distal neurovascular exam is intact. Assessment :  Left knee patellofemoral syndrome, patella chondromalacia, patella tendinitis, possible medial meniscus tear    Impression:  Encounter Diagnosis   Name Primary?  Tear of medial meniscus of knee, current, unspecified laterality, unspecified tear type, initial encounter Yes       Office Procedures:  No orders of the defined types were placed in this encounter. No orders of the defined types were placed in this encounter. Treatment Plan:  Based on patient's history and physical exam I'm concerned about Left knee patellofemoral syndrome, patella chondromalacia, patella tendinitis, possible medial meniscus tear therefore I would like to obtain an MRI to further evaluate. Once the MRI is obtained the patient will follow up with me for further evaluation and discussion. Patient agrees with this plan, all of their questions were answered best of our ability and to their satisfaction.           Sudarshan Georges

## 2019-04-19 ENCOUNTER — TELEPHONE (OUTPATIENT)
Dept: ORTHOPEDIC SURGERY | Age: 39
End: 2019-04-19

## 2019-04-19 NOTE — TELEPHONE ENCOUNTER
CALLED AND SPOKE WITH PATIENT, MRI APPROVED AND TO SCHEDU[place] [facility] [place] [facility] [place] AND TO MAKE APPT AFTER SCAN TO GO OVER RESULTS IN OFFICE

## 2019-05-02 ENCOUNTER — OFFICE VISIT (OUTPATIENT)
Dept: ORTHOPEDIC SURGERY | Age: 39
End: 2019-05-02
Payer: COMMERCIAL

## 2019-05-02 VITALS — WEIGHT: 285.94 LBS | BODY MASS INDEX: 42.35 KG/M2 | HEIGHT: 69 IN

## 2019-05-02 DIAGNOSIS — M17.12 PRIMARY OSTEOARTHRITIS OF LEFT KNEE: Primary | ICD-10-CM

## 2019-05-02 DIAGNOSIS — M94.20 CHONDROMALACIA: ICD-10-CM

## 2019-05-02 PROCEDURE — 99213 OFFICE O/P EST LOW 20 MIN: CPT | Performed by: ORTHOPAEDIC SURGERY

## 2019-05-02 PROCEDURE — 20610 DRAIN/INJ JOINT/BURSA W/O US: CPT | Performed by: ORTHOPAEDIC SURGERY

## 2019-05-02 NOTE — PROGRESS NOTES
Depo-Medrol administration details:  Date & Time: 5/2/19 9:41 AM  Site & Comments:L KNEE administered by Dr. Ofe Morales  # CC: 2  [address]: [id number]   Lot number: [id number]  Exp: 11/2019    Sensorcaine 0.25%                                            # CC: 4                                                                  NDC: 13667-268-50                                                Lot number: [id number]                                     Exp: 12/2022                                                                                                   Lidocaine 1%  #CC: 4  ND: 2906-4011-52  Lot#: [id number]  Exp: 10/1/2020

## 2019-05-02 NOTE — PROGRESS NOTES
Chief Complaint:  Follow-up (TR MRI LEFT KNEE)      SUBJECTIVE:  Danielle Rowley is a 41 y.o. female who returns today to review MRI results of the left knee. She continues to have 5 out of 10 mostly anterior pain and grinding with range of motion continues to stay active with walking, teaching water aerobics and weight training. Has pain with lunges, is able to do squats with minimal complaints. Pain Assessment:  Pain Assessment  Location of Pain: Knee  Location Modifiers: Left  Severity of Pain: 5  Duration of Pain: A few hours  Frequency of Pain: Intermittent  Limiting Behavior: Yes  Relieving Factors: Rest  Result of Injury: Yes  Work-Related Injury: No  Are there other pain locations you wish to document?: No      Medical History:  Patient's medications, allergies, past medical, surgical, social and family histories were reviewed and updated as appropriate. Review of Systems:  Constitutional: negative  Respiratory: negative  Cardiovascular: negative  Musculoskeletal:negative except for Follow-up (TR MRI LEFT KNEE)    Relevant review of systems reviewed and available in the patient's chart in media tab    General Exam:   Constitutional: Patient is adequately groomed with no evidence of malnutrition  Mental Status: The patient is oriented to time, place and person. The patient's mood and affect are appropriate. Vascular: Examination reveals no swelling or calf tenderness. Peripheral pulses are palpable and 2+. OBJECTIVE:  Vital Signs:  Vitals:    05/02/19 0913   Weight: 285 lb 15 oz (129.7 kg)   Height: 5' 9.02\" (1.753 m)       Appearance: alert, well appearing, and in no distress, oriented to person, place, and time and overweight. Physical exam:   No effusion to the left knee. Continued crepitus under the patella with range of motion and positive grind test.  No tenderness to palpation surrounding the lateral joint lines. 5 out of 5 flexion and extension strength. Ligaments are stable.   Distal neurovascular exam is intact. MRI images of the left knee were reviewed and show:  Grade 3-4 chondromalacia of the lateral patellofemoral compartment, with 0.8 x 2 cm osteochondral erosion with subchondral pseudocyst formation and reactive osteoedema of the midbody and superior lateral trochlea. No traumatic medial or lateral meniscus tear. No cruciate or collateral ligament injury. Grade 2 chondromalacia of the medial femorotibial compartment, with mild chondral thinning weight-bearing surfaces. 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  Primary osteoarthritis of left knee Yes    Chondromalacia        Office Procedures:  Orders Placed This Encounter   Procedures    CT METHYLPREDNISOLONE 40 MG INJ    CT ARTHROCENTESIS ASPIR&/INJ MAJOR JT/BURSA W/O US    ORTHOVISC INJ (For Auth/Precert)     Standing Status:   Future     Standing Expiration Date:   5/1/2020         Procedures    CT METHYLPREDNISOLONE 40 MG INJ    CT ARTHROCENTESIS ASPIR&/INJ MAJOR JT/BURSA W/O US    ORTHOVISC INJ (For Auth/Precert)         Treatment Plan: We reviewed MRI findings, she is very difficult problem, she has extensive changes with pseudocyst in the patellofemoral compartment. We discussed treatment options including continued exercise, weight loss, strengthening, avoiding aggravating activities such as stairs, lunges. Continue anti-inflammatories, ice as needed. Discussed various injections including cortisone, Visco supplementation and PRP. Patient would like to pursue cortisone injection today and approval for Visco supplementation. The risks and benefits of an injection were discussed with the patient. The patient had full opportunity to ask questions and all were answered. The patient then provided verbal informed consent.   The skin was then prepped with betadine

## 2019-05-03 ENCOUNTER — TELEPHONE (OUTPATIENT)
Dept: ORTHOPEDIC SURGERY | Age: 39
End: 2019-05-03

## 2019-05-03 NOTE — TELEPHONE ENCOUNTER
05/03/2019  [facility]  (SERIES OF 3) LEFT KNEE. APPROVED # [id number]. REQUEST ID: [id number]. DATES: 05/03/2019 - 06/07/2019. PER FAX FROM Sudarshan Jeronimo, [facility].  AP

## 2019-05-03 NOTE — TELEPHONE ENCOUNTER
LM with patient to let them know their orthovisc l knee has been approved and to call and schedule, provided the phone number.

## 2019-05-13 ENCOUNTER — OFFICE VISIT (OUTPATIENT)
Dept: ORTHOPEDIC SURGERY | Age: 39
End: 2019-05-13
Payer: COMMERCIAL

## 2019-05-13 VITALS
SYSTOLIC BLOOD PRESSURE: 124 MMHG | HEART RATE: 78 BPM | BODY MASS INDEX: 42.35 KG/M2 | HEIGHT: 69 IN | DIASTOLIC BLOOD PRESSURE: 74 MMHG | WEIGHT: 285.94 LBS

## 2019-05-13 DIAGNOSIS — M17.12 PRIMARY OSTEOARTHRITIS OF LEFT KNEE: Primary | ICD-10-CM

## 2019-05-13 PROCEDURE — 99213 OFFICE O/P EST LOW 20 MIN: CPT | Performed by: ORTHOPAEDIC SURGERY

## 2019-05-13 PROCEDURE — 20610 DRAIN/INJ JOINT/BURSA W/O US: CPT | Performed by: ORTHOPAEDIC SURGERY

## 2019-05-13 NOTE — PROGRESS NOTES
Visco administration details:  Date & Time: 5/13/19 3:30 PM  Site & Comments:L KNEE administered by Dr. Milly Dahl [address]: [id number]  Lot#: [id number]  Exp:12/31/2020
JT/BURSA W/O US  Assessment:  1)  Orthovisc  injection of the Left knee  2) Osteoarthritis    Plan:  1) ice, elevation, gentle range of motion as needed for swelling or stiffness of the knee  2) NSAIDs for any pain after injection   3) F/U IN [address] [place] [address]

## 2019-05-20 ENCOUNTER — OFFICE VISIT (OUTPATIENT)
Dept: ORTHOPEDIC SURGERY | Age: 39
End: 2019-05-20
Payer: COMMERCIAL

## 2019-05-20 VITALS
SYSTOLIC BLOOD PRESSURE: 139 MMHG | HEIGHT: 69 IN | WEIGHT: 285.94 LBS | HEART RATE: 63 BPM | BODY MASS INDEX: 42.35 KG/M2 | DIASTOLIC BLOOD PRESSURE: 81 MMHG

## 2019-05-20 DIAGNOSIS — M17.12 PRIMARY OSTEOARTHRITIS OF LEFT KNEE: Primary | ICD-10-CM

## 2019-05-20 PROCEDURE — 20610 DRAIN/INJ JOINT/BURSA W/O US: CPT | Performed by: ORTHOPAEDIC SURGERY

## 2019-05-20 NOTE — PROGRESS NOTES
Visco administration details:  Date & Time: 5/20/19 3:34 PM  Site & Comments:l knee administered by Dr. Keyla Gonzales. [address]: [id number]  GLU#:[id number]  Exp:11/30/2020

## 2019-05-20 NOTE — PROGRESS NOTES
Subjective    Patient ID: Molly Taylor is a 41 y.o. [place]all[place] female. Chief Complaint   Patient presents with    Follow-up     #2 orthovisc inj left knee        Patient presents today for the 2 injection of Orthovisc . Pt has been previously diagnosed with osteoarthritis of the knee as documented in the prior progress notes. This injection is for the patients Left knee. Patient denies and fevers, chills, recent infections, or new injuries to the knee. Pain Assessment:  Pain Assessment  Location of Pain: Knee  Location Modifiers: Left  Severity of Pain: 4  Quality of Pain: Dull  Duration of Pain: Persistent  Frequency of Pain: Intermittent  Aggravating Factors: Walking, Stairs, Standing  Limiting Behavior: Yes  Relieving Factors: Rest  Result of Injury: Yes  Work-Related Injury: No  Are there other pain locations you wish to document?: No    Patient's medications, allergies, past medical, surgical, social and family histories were reviewed and updated as appropriate. Physical Exam:    The patient does have  pain on motion, there is not an effusion, there is tenderness over the  medial, lateral, anterior region. No erythema noted. Sensation intact to touch. Pulses present distally. Procedure Note:    The patients Left knee was initially prepped using Betadine swab. The superior lateral aspect of the knee was prepped and used for this injection. Under aseptic technique the soft tissues were anesthetized using 1% Lidocaine without epinephrine. A total of 2ml of Lidocaine was injected into the soft tissues leading up to the joint capsule. Following adequate anesthesia, the 2ml of  Orthovisc injection was performed. This was injected directly into the joint capsule of the knee. No complications were encountered and the patient tolerated the procedure well. A band aid was placed over the injection site following the procedure.       ORTHOVISC INJ PER DOSE  [place] ARTHROCENTESIS ASPIR&/INJ MAJOR JT/BURSA W/O

## 2019-05-30 ENCOUNTER — OFFICE VISIT (OUTPATIENT)
Dept: ORTHOPEDIC SURGERY | Age: 39
End: 2019-05-30
Payer: COMMERCIAL

## 2019-05-30 VITALS — BODY MASS INDEX: 42.35 KG/M2 | WEIGHT: 285.94 LBS | HEIGHT: 69 IN

## 2019-05-30 DIAGNOSIS — M17.12 PRIMARY OSTEOARTHRITIS OF LEFT KNEE: Primary | ICD-10-CM

## 2019-05-30 PROCEDURE — 20610 DRAIN/INJ JOINT/BURSA W/O US: CPT | Performed by: ORTHOPAEDIC SURGERY

## 2019-05-30 NOTE — PROGRESS NOTES
Visco administration details:  Date & Time: 5/30/19 3:43 PM  Site & Comments:L KNEE administered by Dr. Arredondo alondra  [facility]: [id number]  [facility]#:[id number]  Exp:12/31/2020

## 2019-05-30 NOTE — PROGRESS NOTES
Subjective    Patient ID: Miles Niño is a 41 y.o. Judithe [place] female. Chief Complaint   Patient presents with    Follow-up     #3 orthovisc l knee       Patient presents today for the 3 injection of Orthovisc . Pt has been previously diagnosed with osteoarthritis of the knee as documented in the prior progress notes. This injection is for the patients Left knee. Patient denies and fevers, chills, recent infections, or new injuries to the knee. Pain Assessment:  Pain Assessment  Location of Pain: Knee  Location Modifiers: Left  Severity of Pain: 4  Quality of Pain: Dull, Aching, Throbbing  Frequency of Pain: Intermittent  Limiting Behavior: Some  Result of Injury: No  Work-Related Injury: No  Are there other pain locations you wish to document?: No    Patient's medications, allergies, past medical, surgical, social and family histories were reviewed and updated as appropriate. Physical Exam:    The patient does have  pain on motion, there is not an effusion, there is tenderness over the  medial, lateral, anterior region. No erythema noted. Sensation intact to touch. Pulses present distally. Procedure Note:    The patients Left knee was initially prepped using Betadine swab. The superior lateral aspect of the knee was prepped and used for this injection. Under aseptic technique the soft tissues were anesthetized using 1% Lidocaine without epinephrine. A total of 2ml of Lidocaine was injected into the soft tissues leading up to the joint capsule. Following adequate anesthesia, the 2ml of  Orthovisc injection was performed. This was injected directly into the joint capsule of the knee. No complications were encountered and the patient tolerated the procedure well. A band aid was placed over the injection site following the procedure.       ORTHOVISC INJ PER DOSE  MA ARTHROCENTESIS ASPIR&/INJ MAJOR JT/BURSA W/O US  Assessment:  1)  Orthovisc  injection of the Left knee  2) Osteoarthritis    Plan:  1) ice, elevation, gentle range of motion as needed for swelling or stiffness of the knee  2) NSAIDs for any pain after injection         Keara Elena

## 2019-06-14 ENCOUNTER — OFFICE VISIT (OUTPATIENT)
Dept: FAMILY MEDICINE CLINIC | Age: 39
End: 2019-06-14
Payer: COMMERCIAL

## 2019-06-14 VITALS
DIASTOLIC BLOOD PRESSURE: 86 MMHG | OXYGEN SATURATION: 92 % | SYSTOLIC BLOOD PRESSURE: 124 MMHG | BODY MASS INDEX: 43.4 KG/M2 | WEIGHT: 293 LBS | RESPIRATION RATE: 16 BRPM | HEART RATE: 77 BPM | HEIGHT: 69 IN

## 2019-06-14 DIAGNOSIS — F32.A ANXIETY AND DEPRESSION: Primary | ICD-10-CM

## 2019-06-14 DIAGNOSIS — F41.9 ANXIETY AND DEPRESSION: Primary | ICD-10-CM

## 2019-06-14 PROCEDURE — 99213 OFFICE O/P EST LOW 20 MIN: CPT | Performed by: FAMILY MEDICINE

## 2019-06-14 NOTE — PROGRESS NOTES
Chief Complaint   Patient presents with    3 Month Follow-Up     anxiety/depression, wants to discuss switching from lexapro 20mg         HPI      41 y.o. female presents today for follow up.    History of anxiety depression on lexapro 20mg. She reports that she feels tired with the medication. She would like to switch to something else. She states that her sister is on zoloft and buspar and doing well. She is seeing a counselor at [facility]. Denies any SI/HI.      Patient Active Problem List   Diagnosis    Tenosynovitis of right ankle    Generalized idiopathic epilepsy, not intractable, without status epilepticus ([facility] [address]    Pseudotumor cerebri syndrome    Patellar tendinitis of left knee    Anxiety and depression    Primary osteoarthritis of left knee    Chondromalacia     Past Medical History:   Diagnosis Date    Anxiety and depression 12/7/2018    Patellar tendinitis of left knee     Primary osteoarthritis of left knee 5/2/2019    Seizures ([facility] [address]     last epileptic episode was 12 years ago       Past Surgical History:   Procedure Laterality Date    KNEE CARTILAGE SURGERY      WRIST SURGERY      Bone spur removal     Most Recent Immunizations   Administered Date(s) Administered    Influenza Vaccine, unspecified formulation 11/09/2017    Influenza Virus Vaccine 11/09/2017    Influenza, [place], 3 Years and older, IM (Fluzone 3 yrs and older or Afluria 5 yrs and older) 11/09/2017    Influenza, [place] Sames, 3 yrs and older, IM, PF (Fluzone 3 yrs and older or Afluria 5 yrs and older) 11/09/2018    Tdap (Boostrix, Adacel) 12/19/2017        Current Outpatient Medications   Medication Sig Dispense Refill    sertraline (ZOLOFT) 50 MG tablet Take 1 tablet by mouth daily 30 tablet 0    dexamethasone (DECADRON) 4 MG/ML injection Dose as directed per physical therapy 30 mL 0     No current facility-administered medications for this visit.       No Known Allergies    Social History     Socioeconomic History  Marital status: Single     Spouse name: None    Number of children: None    Years of education: None    Highest education level: None   Occupational History    None   Social Needs    Financial resource strain: None    Food insecurity:     Worry: None     Inability: None    Transportation needs:     Medical: None     Non-medical: None   Tobacco Use    Smoking status: Never Smoker    Smokeless tobacco: Never Used   Substance and Sexual Activity    Alcohol use: Yes     Alcohol/week: 0.6 oz     Types: 1 Glasses of wine per week     Comment: occasional    Drug use: No    Sexual activity: None   Lifestyle    Physical activity:     Days per week: None     Minutes per session: None    Stress: None   Relationships    Social connections:     Talks on phone: None     Gets together: None     Attends Holiness service: None     Active member of club or organization: None     Attends meetings of clubs or organizations: None     Relationship status: None    Intimate partner violence:     Fear of current or ex partner: None     Emotionally abused: None     Physically abused: None     Forced sexual activity: None   Other Topics Concern    None   Social History Narrative    None     Family History   Problem Relation Age of Onset    Diabetes Mother     High Blood Pressure Father     High Blood Pressure Brother                               Review Of Systems    Review of Systems    PHYSICAL EXAMINATION:    /86 (Site: Left Upper Arm, Position: Sitting, Cuff Size: Medium Adult)   Pulse 77   Resp 16   Ht 5' 9.02\" (1.753 m)   Wt (!) 305 lb (138.3 kg)   LMP 05/27/2019 (Exact Date)   SpO2 92%   Breastfeeding? No   BMI 45.01 kg/m²      Physical Exam      ASSESSMENT:   Well Adult, See encounter diagnoses  [place] was seen today for 3 month follow-up. Diagnoses and all orders for this visit:    Anxiety and depression  Will switch to zoloft. -     sertraline (ZOLOFT) 50 MG tablet;  Take 1 tablet by mouth daily      Billing based on time. 15 minutes spent with the patient, and greater than 50% was spent in counseling       Plan:   See orders and medications filed with this encounter. Return in about 1 month (around 7/12/2019).

## 2019-07-06 DIAGNOSIS — F32.A ANXIETY AND DEPRESSION: ICD-10-CM

## 2019-07-06 DIAGNOSIS — F41.9 ANXIETY AND DEPRESSION: ICD-10-CM

## 2019-07-19 ENCOUNTER — OFFICE VISIT (OUTPATIENT)
Dept: FAMILY MEDICINE CLINIC | Age: 39
End: 2019-07-19
Payer: COMMERCIAL

## 2019-07-19 VITALS
TEMPERATURE: 98.1 F | WEIGHT: 293 LBS | BODY MASS INDEX: 45.34 KG/M2 | OXYGEN SATURATION: 100 % | HEART RATE: 56 BPM | SYSTOLIC BLOOD PRESSURE: 114 MMHG | DIASTOLIC BLOOD PRESSURE: 66 MMHG

## 2019-07-19 DIAGNOSIS — F41.9 ANXIETY AND DEPRESSION: ICD-10-CM

## 2019-07-19 DIAGNOSIS — F32.A ANXIETY AND DEPRESSION: ICD-10-CM

## 2019-07-19 PROCEDURE — 99214 OFFICE O/P EST MOD 30 MIN: CPT | Performed by: FAMILY MEDICINE

## 2019-07-19 ASSESSMENT — ENCOUNTER SYMPTOMS: SHORTNESS OF BREATH: 0

## 2019-07-19 NOTE — PROGRESS NOTES
Chief Complaint   Patient presents with    1 Month Follow-Up         HPI      41 y.o. female presents today for follow up. History of anxiety depression was previously on Lexapro 20 mg at last office visit she was switched to Zoloft because her sister was taking it and doing well. Patient reports she is taking it without any medication side effects. Patient states she feels about the same she did take in the Lexapro. She has had some increased stress she has quit her job will be starting a new job in 2 weeks. She will have a lapse in insurance. Denies SI/HI. Patient Active Problem List   Diagnosis    Tenosynovitis of right ankle    Generalized idiopathic epilepsy, not intractable, without status epilepticus ([address]    Pseudotumor cerebri syndrome    Patellar tendinitis of left knee    Anxiety and depression    Primary osteoarthritis of left knee    Chondromalacia     Past Medical History:   Diagnosis Date    Anxiety and depression 12/7/2018    Patellar tendinitis of left knee     Primary osteoarthritis of left knee 5/2/2019    Seizures ([address]     last epileptic episode was 12 years ago       Past Surgical History:   Procedure Laterality Date    KNEE CARTILAGE SURGERY      WRIST SURGERY      Bone spur removal     Most Recent Immunizations   Administered Date(s) Administered    Influenza Vaccine, unspecified formulation 11/09/2018    Influenza Virus Vaccine 11/09/2017    InfluenzaNat, 3 Years and older, IM (Fluzone 3 yrs and older or Afluria 5 yrs and older) 11/09/2017    InfluenzaNat, 3 yrs and older, IM, PF (Fluzone 3 yrs and older or Afluria 5 yrs and older) 11/09/2018    Tdap (Boostrix, Adacel) 12/19/2017        Current Outpatient Medications   Medication Sig Dispense Refill    sertraline (ZOLOFT) 50 MG tablet Take 1.5 tablets by mouth daily 180 tablet 1     No current facility-administered medications for this visit.       No Known Allergies    Social History

## 2019-08-08 RX ORDER — AMOXICILLIN AND CLAVULANATE POTASSIUM 875; 125 MG/1; MG/1
1 TABLET, FILM COATED ORAL 2 TIMES DAILY
Qty: 20 TABLET | Refills: 0 | Status: SHIPPED | OUTPATIENT
Start: 2019-08-08 | End: 2019-08-18

## 2019-09-27 ENCOUNTER — PATIENT MESSAGE (OUTPATIENT)
Dept: ORTHOPEDIC SURGERY | Age: 39
End: 2019-09-27

## 2019-10-01 ENCOUNTER — TELEPHONE (OUTPATIENT)
Dept: ORTHOPEDIC SURGERY | Age: 39
End: 2019-10-01

## 2019-11-01 ENCOUNTER — OFFICE VISIT (OUTPATIENT)
Dept: FAMILY MEDICINE CLINIC | Age: 39
End: 2019-11-01

## 2019-11-01 VITALS
TEMPERATURE: 98.1 F | BODY MASS INDEX: 47.82 KG/M2 | HEART RATE: 53 BPM | OXYGEN SATURATION: 100 % | SYSTOLIC BLOOD PRESSURE: 128 MMHG | WEIGHT: 293 LBS | DIASTOLIC BLOOD PRESSURE: 76 MMHG

## 2019-11-01 DIAGNOSIS — F32.A ANXIETY AND DEPRESSION: Primary | ICD-10-CM

## 2019-11-01 DIAGNOSIS — F41.9 ANXIETY AND DEPRESSION: Primary | ICD-10-CM

## 2019-11-04 ENCOUNTER — NURSE ONLY (OUTPATIENT)
Dept: FAMILY MEDICINE CLINIC | Age: 39
End: 2019-11-04

## 2019-11-07 ENCOUNTER — OFFICE VISIT (OUTPATIENT)
Dept: ORTHOPEDIC SURGERY | Age: 39
End: 2019-11-07
Payer: COMMERCIAL

## 2019-11-07 VITALS — WEIGHT: 293 LBS | HEIGHT: 69 IN | BODY MASS INDEX: 43.4 KG/M2

## 2019-11-07 DIAGNOSIS — M17.12 PRIMARY OSTEOARTHRITIS OF LEFT KNEE: Primary | ICD-10-CM

## 2019-11-07 PROCEDURE — 99213 OFFICE O/P EST LOW 20 MIN: CPT | Performed by: ORTHOPAEDIC SURGERY

## 2019-11-07 PROCEDURE — 20610 DRAIN/INJ JOINT/BURSA W/O US: CPT | Performed by: ORTHOPAEDIC SURGERY

## 2019-11-07 RX ORDER — METHYLPREDNISOLONE ACETATE 40 MG/ML
40 INJECTION, SUSPENSION INTRA-ARTICULAR; INTRALESIONAL; INTRAMUSCULAR; SOFT TISSUE ONCE
Status: COMPLETED | OUTPATIENT
Start: 2019-11-07 | End: 2019-11-07

## 2019-11-07 RX ADMIN — METHYLPREDNISOLONE ACETATE 40 MG: 40 INJECTION, SUSPENSION INTRA-ARTICULAR; INTRALESIONAL; INTRAMUSCULAR; SOFT TISSUE at 16:21

## 2019-11-08 ENCOUNTER — TELEPHONE (OUTPATIENT)
Dept: FAMILY MEDICINE CLINIC | Age: 39
End: 2019-11-08

## 2019-11-08 DIAGNOSIS — F41.9 ANXIETY AND DEPRESSION: Primary | ICD-10-CM

## 2019-11-08 DIAGNOSIS — F32.A ANXIETY AND DEPRESSION: Primary | ICD-10-CM

## 2019-11-25 DIAGNOSIS — F32.A ANXIETY AND DEPRESSION: Primary | ICD-10-CM

## 2019-11-25 DIAGNOSIS — F41.9 ANXIETY AND DEPRESSION: Primary | ICD-10-CM

## 2019-11-25 RX ORDER — DULOXETIN HYDROCHLORIDE 30 MG/1
30 CAPSULE, DELAYED RELEASE ORAL DAILY
Qty: 30 CAPSULE | Refills: 0 | Status: SHIPPED | OUTPATIENT
Start: 2019-11-25 | End: 2019-12-17 | Stop reason: SDUPTHER

## 2019-12-09 ENCOUNTER — OFFICE VISIT (OUTPATIENT)
Dept: FAMILY MEDICINE CLINIC | Age: 39
End: 2019-12-09
Payer: COMMERCIAL

## 2019-12-09 VITALS
SYSTOLIC BLOOD PRESSURE: 110 MMHG | WEIGHT: 293 LBS | RESPIRATION RATE: 18 BRPM | HEART RATE: 75 BPM | BODY MASS INDEX: 41.95 KG/M2 | OXYGEN SATURATION: 98 % | HEIGHT: 70 IN | DIASTOLIC BLOOD PRESSURE: 86 MMHG | TEMPERATURE: 98.6 F

## 2019-12-09 DIAGNOSIS — B02.9 HERPES ZOSTER WITHOUT COMPLICATION: Primary | ICD-10-CM

## 2019-12-09 PROCEDURE — 99213 OFFICE O/P EST LOW 20 MIN: CPT | Performed by: NURSE PRACTITIONER

## 2019-12-09 RX ORDER — IBUPROFEN 800 MG/1
800 TABLET ORAL
Qty: 90 TABLET | Refills: 0 | Status: SHIPPED | OUTPATIENT
Start: 2019-12-09 | End: 2019-12-27

## 2019-12-09 RX ORDER — VALACYCLOVIR HYDROCHLORIDE 1 G/1
1000 TABLET, FILM COATED ORAL 3 TIMES DAILY
Qty: 21 TABLET | Refills: 0 | Status: SHIPPED | OUTPATIENT
Start: 2019-12-09 | End: 2019-12-16

## 2019-12-09 ASSESSMENT — ENCOUNTER SYMPTOMS
SORE THROAT: 1
RESPIRATORY NEGATIVE: 1
GASTROINTESTINAL NEGATIVE: 1

## 2019-12-11 ENCOUNTER — PATIENT MESSAGE (OUTPATIENT)
Dept: FAMILY MEDICINE CLINIC | Age: 39
End: 2019-12-11

## 2019-12-11 RX ORDER — GABAPENTIN 100 MG/1
100 CAPSULE ORAL NIGHTLY
Qty: 30 CAPSULE | Refills: 0 | Status: SHIPPED | OUTPATIENT
Start: 2019-12-11 | End: 2019-12-27 | Stop reason: ALTCHOICE

## 2019-12-11 RX ORDER — HYDROXYZINE HYDROCHLORIDE 25 MG/1
25 TABLET, FILM COATED ORAL EVERY 8 HOURS PRN
Qty: 30 TABLET | Refills: 0 | Status: SHIPPED | OUTPATIENT
Start: 2019-12-11 | End: 2019-12-21

## 2019-12-17 DIAGNOSIS — F32.A ANXIETY AND DEPRESSION: ICD-10-CM

## 2019-12-17 DIAGNOSIS — F41.9 ANXIETY AND DEPRESSION: ICD-10-CM

## 2019-12-17 RX ORDER — DULOXETIN HYDROCHLORIDE 30 MG/1
CAPSULE, DELAYED RELEASE ORAL
Qty: 30 CAPSULE | Refills: 0 | Status: SHIPPED | OUTPATIENT
Start: 2019-12-17 | End: 2019-12-27 | Stop reason: SDUPTHER

## 2019-12-27 ENCOUNTER — OFFICE VISIT (OUTPATIENT)
Dept: FAMILY MEDICINE CLINIC | Age: 39
End: 2019-12-27
Payer: COMMERCIAL

## 2019-12-27 VITALS
WEIGHT: 293 LBS | DIASTOLIC BLOOD PRESSURE: 88 MMHG | RESPIRATION RATE: 16 BRPM | OXYGEN SATURATION: 98 % | SYSTOLIC BLOOD PRESSURE: 128 MMHG | BODY MASS INDEX: 47.64 KG/M2 | HEART RATE: 72 BPM

## 2019-12-27 DIAGNOSIS — F32.A ANXIETY AND DEPRESSION: Primary | ICD-10-CM

## 2019-12-27 DIAGNOSIS — F41.9 ANXIETY AND DEPRESSION: Primary | ICD-10-CM

## 2019-12-27 DIAGNOSIS — J45.990 EXERCISE INDUCED BRONCHOSPASM: ICD-10-CM

## 2019-12-27 PROCEDURE — 99213 OFFICE O/P EST LOW 20 MIN: CPT | Performed by: FAMILY MEDICINE

## 2019-12-27 RX ORDER — ALBUTEROL SULFATE 90 UG/1
2 AEROSOL, METERED RESPIRATORY (INHALATION) EVERY 6 HOURS PRN
Qty: 1 INHALER | Refills: 2 | Status: SHIPPED | OUTPATIENT
Start: 2019-12-27 | End: 2021-10-13 | Stop reason: SDUPTHER

## 2019-12-27 RX ORDER — DULOXETIN HYDROCHLORIDE 30 MG/1
CAPSULE, DELAYED RELEASE ORAL
Qty: 90 CAPSULE | Refills: 1 | Status: SHIPPED | OUTPATIENT
Start: 2019-12-27 | End: 2020-02-24

## 2019-12-27 ASSESSMENT — ENCOUNTER SYMPTOMS: SHORTNESS OF BREATH: 0

## 2020-01-02 RX ORDER — GABAPENTIN 100 MG/1
100 CAPSULE ORAL NIGHTLY
Qty: 30 CAPSULE | Refills: 0 | OUTPATIENT
Start: 2020-01-02 | End: 2020-02-01

## 2020-01-27 ENCOUNTER — OFFICE VISIT (OUTPATIENT)
Dept: FAMILY MEDICINE CLINIC | Age: 40
End: 2020-01-27
Payer: COMMERCIAL

## 2020-01-27 VITALS
RESPIRATION RATE: 16 BRPM | WEIGHT: 293 LBS | BODY MASS INDEX: 45.14 KG/M2 | DIASTOLIC BLOOD PRESSURE: 72 MMHG | HEART RATE: 66 BPM | OXYGEN SATURATION: 98 % | SYSTOLIC BLOOD PRESSURE: 128 MMHG

## 2020-01-27 PROCEDURE — 99214 OFFICE O/P EST MOD 30 MIN: CPT | Performed by: FAMILY MEDICINE

## 2020-01-27 RX ORDER — NEOMYCIN SULFATE, POLYMYXIN B SULFATE AND HYDROCORTISONE 10; 3.5; 1 MG/ML; MG/ML; [USP'U]/ML
4 SUSPENSION/ DROPS AURICULAR (OTIC) 3 TIMES DAILY
Qty: 1 BOTTLE | Refills: 0 | Status: SHIPPED | OUTPATIENT
Start: 2020-01-27 | End: 2020-02-06

## 2020-01-27 ASSESSMENT — ENCOUNTER SYMPTOMS
SORE THROAT: 0
COUGH: 0
VOMITING: 0
ABDOMINAL PAIN: 0
RHINORRHEA: 1

## 2020-01-27 NOTE — PROGRESS NOTES
Chief Complaint   Patient presents with   Annmarie Martell     thinks she stratch her ear in her sleep and she woke up and it was bleeding and swollen, wants to make sure everything is ok          Otalgia    There is pain in the left ear. This is a new problem. Episode onset: pain started friday then got pruritic. The problem occurs constantly. The problem has been gradually worsening. There has been no fever. Pain severity now: no pain if she doesn't touch her ear. Associated symptoms include ear discharge (little bloody discharge this morning) and rhinorrhea. Pertinent negatives include no abdominal pain, coughing, sore throat or vomiting. Associated symptoms comments: Sneezing and pruritic eyes. She has tried nothing for the symptoms. use to get ear infections when she was little but hasn't had them in a while         42 y.o. female presents today with above complaints.         Patient Active Problem List   Diagnosis    Tenosynovitis of right ankle    Generalized idiopathic epilepsy, not intractable, without status epilepticus ([address]    Pseudotumor cerebri syndrome    Patellar tendinitis of left knee    Anxiety and depression    Primary osteoarthritis of left knee    Chondromalacia     Past Medical History:   Diagnosis Date    Anxiety and depression 12/7/2018    Patellar tendinitis of left knee     Primary osteoarthritis of left knee 5/2/2019    Seizures ([address]     last epileptic episode was 12 years ago       Past Surgical History:   Procedure Laterality Date    KNEE CARTILAGE SURGERY      WRIST SURGERY      Bone spur removal     Most Recent Immunizations   Administered Date(s) Administered    Influenza Vaccine, unspecified formulation 11/09/2018    Influenza Virus Vaccine 11/09/2017    Influenza, Quadv, IM, (6 mo and older Fluzone, Flulaval, Fluarix and 3 yrs and older Afluria) 11/09/2017    Influenza, Quadv, IM, PF (6 mo and older Fluzone, Flulaval, Fluarix, and 3 yrs and older Afluria) 11/09/2018 Pressure Father     High Blood Pressure Brother                               Review Of Systems    Review of Systems   HENT: Positive for ear discharge (little bloody discharge this morning), ear pain and rhinorrhea. Negative for sore throat. Respiratory: Negative for cough. Gastrointestinal: Negative for abdominal pain and vomiting. PHYSICAL EXAMINATION:    /72   Pulse 66   Resp 16   Wt (!) 314 lb 9.6 oz (142.7 kg)   SpO2 98%   BMI 45.14 kg/m²      Physical Exam  Vitals signs reviewed. Constitutional:       Appearance: Normal appearance. HENT:      Head: Normocephalic and atraumatic. Comments: Right TM dull no erythema. Purulent discharge in left ear canal.  Eyes:      Extraocular Movements: Extraocular movements intact. Conjunctiva/sclera: Conjunctivae normal.   Cardiovascular:      Rate and Rhythm: Normal rate and regular rhythm. Heart sounds: Normal heart sounds. Pulmonary:      Effort: Pulmonary effort is normal.      Breath sounds: Normal breath sounds. Skin:     General: Skin is warm and dry. Neurological:      General: No focal deficit present. Mental Status: She is alert and oriented to person, place, and time. ASSESSMENT:   Well Adult, See encounter diagnoses  Charla Gusman was seen today for otalgia. Diagnoses and all orders for this visit:    Other infective acute otitis externa of left ear  Also recommended she start Claritin daily. -     neomycin-polymyxin-hydrocortisone (CORTISPORIN) 3.5-63414-9 otic suspension; Place 4 drops into the left ear 3 times daily for 10 days          Plan:   See orders and medications filed with this encounter. Return if symptoms worsen or fail to improve.

## 2020-01-30 NOTE — TELEPHONE ENCOUNTER
Anesthesia Post Evaluation    Patient: Valentin Loving    Procedure(s) Performed: Procedure(s) (LRB):  REPAIR, HERNIA, INGUINAL, WITHOUT HISTORY OF PRIOR REPAIR, AGE 5 YEARS OR OLDER (Right)    Final Anesthesia Type: general    Patient location during evaluation: PACU  Patient participation: Yes- Able to Participate  Level of consciousness: awake and alert  Post-procedure vital signs: reviewed and stable  Pain management: adequate  Airway patency: patent  KAREY mitigation strategies: Multimodal analgesia, Extubation while patient is awake and Verification of full reversal of neuromuscular block  PONV status at discharge: No PONV  Anesthetic complications: no      Cardiovascular status: blood pressure returned to baseline  Respiratory status: unassisted and spontaneous ventilation  Hydration status: euvolemic  Follow-up not needed.          Vitals Value Taken Time   /70 1/30/2020 11:07 AM   Temp 36.6 °C (97.9 °F) 1/30/2020 11:07 AM   Pulse 84 1/30/2020 11:07 AM   Resp 15 1/30/2020 11:07 AM   SpO2 95 % 1/30/2020 11:07 AM         Event Time     Out of Recovery 11:06:15          Pain/Josh Score: Pain Rating Prior to Med Admin: 3 (1/30/2020 11:03 AM)  Johs Score: 10 (1/30/2020 11:03 AM)         Patient stated that she is not taking it anymore.

## 2020-02-24 ENCOUNTER — OFFICE VISIT (OUTPATIENT)
Dept: FAMILY MEDICINE CLINIC | Age: 40
End: 2020-02-24
Payer: COMMERCIAL

## 2020-02-24 VITALS
DIASTOLIC BLOOD PRESSURE: 66 MMHG | RESPIRATION RATE: 16 BRPM | SYSTOLIC BLOOD PRESSURE: 114 MMHG | OXYGEN SATURATION: 98 % | WEIGHT: 293 LBS | HEART RATE: 64 BPM | BODY MASS INDEX: 47.44 KG/M2

## 2020-02-24 PROCEDURE — G8431 POS CLIN DEPRES SCRN F/U DOC: HCPCS | Performed by: FAMILY MEDICINE

## 2020-02-24 PROCEDURE — 99213 OFFICE O/P EST LOW 20 MIN: CPT | Performed by: FAMILY MEDICINE

## 2020-02-24 RX ORDER — DULOXETIN HYDROCHLORIDE 60 MG/1
CAPSULE, DELAYED RELEASE ORAL
Qty: 90 CAPSULE | Refills: 0 | Status: SHIPPED | OUTPATIENT
Start: 2020-02-24 | End: 2020-03-26

## 2020-02-24 ASSESSMENT — PATIENT HEALTH QUESTIONNAIRE - PHQ9
8. MOVING OR SPEAKING SO SLOWLY THAT OTHER PEOPLE COULD HAVE NOTICED. OR THE OPPOSITE, BEING SO FIGETY OR RESTLESS THAT YOU HAVE BEEN MOVING AROUND A LOT MORE THAN USUAL: 1
1. LITTLE INTEREST OR PLEASURE IN DOING THINGS: 3
SUM OF ALL RESPONSES TO PHQ9 QUESTIONS 1 & 2: 5
5. POOR APPETITE OR OVEREATING: 2
7. TROUBLE CONCENTRATING ON THINGS, SUCH AS READING THE NEWSPAPER OR WATCHING TELEVISION: 1
2. FEELING DOWN, DEPRESSED OR HOPELESS: 2
10. IF YOU CHECKED OFF ANY PROBLEMS, HOW DIFFICULT HAVE THESE PROBLEMS MADE IT FOR YOU TO DO YOUR WORK, TAKE CARE OF THINGS AT HOME, OR GET ALONG WITH OTHER PEOPLE: 2
SUM OF ALL RESPONSES TO PHQ QUESTIONS 1-9: 17
4. FEELING TIRED OR HAVING LITTLE ENERGY: 3
9. THOUGHTS THAT YOU WOULD BE BETTER OFF DEAD, OR OF HURTING YOURSELF: 2
3. TROUBLE FALLING OR STAYING ASLEEP: 1
SUM OF ALL RESPONSES TO PHQ QUESTIONS 1-9: 17
6. FEELING BAD ABOUT YOURSELF - OR THAT YOU ARE A FAILURE OR HAVE LET YOURSELF OR YOUR FAMILY DOWN: 2

## 2020-02-24 NOTE — PROGRESS NOTES
Chief Complaint   Patient presents with    Medication Problem     issues around medication when she is on period- last couple of months but is getting worse for her.  has 30 pills of the Cymbalta left          HPI      42 y.o. female presents today for follow-up of depression. History of anxiety and depression currently on Cymbalta 30 mg was doing well but is noticed for the last couple of months around her menses her mood worsens. She is started cutting herself. Denies SI/HI. Denies any medication side effects. Follows with therapist Nuria Steiner with [facility] sees him once per week. Will be starting DBT as well.        Patient Active Problem List   Diagnosis    Tenosynovitis of right ankle    Generalized idiopathic epilepsy, not intractable, without status epilepticus ([address]    Pseudotumor cerebri syndrome    Patellar tendinitis of left knee    Anxiety and depression    Primary osteoarthritis of left knee    Chondromalacia     Past Medical History:   Diagnosis Date    Anxiety and depression 12/7/2018    Patellar tendinitis of left knee     Primary osteoarthritis of left knee 5/2/2019    Seizures ([address]     last epileptic episode was 12 years ago       Past Surgical History:   Procedure Laterality Date    KNEE CARTILAGE SURGERY      WRIST SURGERY      Bone spur removal     Most Recent Immunizations   Administered Date(s) Administered    Influenza Vaccine, unspecified formulation 11/09/2018    Influenza Virus Vaccine 11/09/2017    Influenza, Quadv, IM, (6 mo and older Fluzone, Flulaval, Fluarix and 3 yrs and older Afluria) 11/09/2017    Influenza, Quadv, IM, PF (6 mo and older Fluzone, Flulaval, Fluarix, and 3 yrs and older Afluria) 11/09/2018    Tdap (Boostrix, Adacel) 12/19/2017        Current Outpatient Medications   Medication Sig Dispense Refill    DULoxetine (CYMBALTA) 60 MG extended release capsule TAKE 1 CAPSULE BY MOUTH EVERY DAY 90 capsule 0    albuterol sulfate HFA (PROAIR HFA) visit:    Anxiety and depression  Increase Cymbalta to 60 mg. Continue with CBT and DBT. Notify me if symptoms worsen or fail to improve. -     DULoxetine (CYMBALTA) 60 MG extended release capsule; TAKE 1 CAPSULE BY MOUTH EVERY DAY      Billing based on time. 15 minutes spent with the patient, and greater than 50% was spent in counseling         Plan:   See orders and medications filed with this encounter. Return in about 3 months (around 5/24/2020). On the basis of positive PHQ-9 screening (PHQ-9 Total Score: 17), the following plan was implemented: cymbalta increased to 60mg. patient will continue to see her therapist.  Patient will follow-up in 3 month(s) with PCP.

## 2020-03-26 RX ORDER — DULOXETIN HYDROCHLORIDE 60 MG/1
CAPSULE, DELAYED RELEASE ORAL
Qty: 90 CAPSULE | Refills: 0 | Status: SHIPPED | OUTPATIENT
Start: 2020-03-26 | End: 2020-06-12

## 2020-05-15 ENCOUNTER — TELEMEDICINE (OUTPATIENT)
Dept: FAMILY MEDICINE CLINIC | Age: 40
End: 2020-05-15
Payer: COMMERCIAL

## 2020-05-15 PROBLEM — E55.9 VITAMIN D DEFICIENCY: Status: ACTIVE | Noted: 2020-05-15

## 2020-05-15 PROBLEM — J45.990 EXERCISE-INDUCED ASTHMA: Status: ACTIVE | Noted: 2020-05-15

## 2020-05-15 PROBLEM — E78.41 ELEVATED LIPOPROTEIN(A): Status: ACTIVE | Noted: 2020-05-15

## 2020-05-15 PROCEDURE — 99214 OFFICE O/P EST MOD 30 MIN: CPT | Performed by: FAMILY MEDICINE

## 2020-05-15 RX ORDER — FLUOXETINE 10 MG/1
20 CAPSULE ORAL DAILY
Qty: 90 CAPSULE | Refills: 1
Start: 2020-05-15

## 2020-05-15 RX ORDER — FLUOXETINE 10 MG/1
1 CAPSULE ORAL DAILY
COMMUNITY
Start: 2020-04-18 | End: 2020-05-15

## 2020-05-15 RX ORDER — MELATONIN
2000 DAILY
Qty: 180 TABLET | Refills: 1 | Status: SHIPPED | OUTPATIENT
Start: 2020-05-15 | End: 2020-12-16 | Stop reason: SDUPTHER

## 2020-05-15 ASSESSMENT — ENCOUNTER SYMPTOMS
BACK PAIN: 0
NAUSEA: 0
ABDOMINAL PAIN: 0
COLOR CHANGE: 0
CHEST TIGHTNESS: 0
SHORTNESS OF BREATH: 0
VOMITING: 0

## 2020-05-15 NOTE — PROGRESS NOTES
5/15/2020    TELEHEALTH EVALUATION -- Audio/Visual (During GDBBF-27 public health emergency)    HPI:    Miki Rose (:  1980) has requested an audio/video evaluation for the following concern(s):    Establish care  Used to see Dr. Kath Brownlee    Hx of anx/dep  Hx of cutting herself, per notes  Taking Cymbalta 60 mg and Prozac 10 mg  Sees a therapist with [facility]   Now sees Psychiatry and they are managing medications     Has seen Neuro in the past  Hx of PTC and epilepsy  Not seeing them anymore  15 years   No cause  Mother had them, thought to be hereditary     Albuterol   Exercise induced asthma  Teaches a swim class     UTD PAP  2017, normal, HPV neg  No new vaginal complaints or AUB  Not currently sexually active, previously was with a male    Questionable family hx of breast cancer MGma, no colon, uterine, or ovarian family cancer Hx    Review of Systems   Constitutional: Negative for activity change, appetite change, chills, diaphoresis, fatigue, fever and unexpected weight change. Eyes: Negative for visual disturbance. Respiratory: Negative for chest tightness and shortness of breath. Cardiovascular: Negative for chest pain, palpitations and leg swelling. Gastrointestinal: Negative for abdominal pain, nausea and vomiting. Genitourinary: Negative for decreased urine volume. Musculoskeletal: Negative for arthralgias and back pain. Skin: Negative for color change. Neurological: Negative for dizziness, tremors, seizures, weakness, light-headedness, numbness and headaches. Psychiatric/Behavioral: Negative for agitation, behavioral problems, confusion, decreased concentration, dysphoric mood, hallucinations, self-injury, sleep disturbance and suicidal ideas. The patient is not nervous/anxious and is not hyperactive. Prior to Visit Medications    Medication Sig Taking?  Authorizing Provider   FLUoxetine (PROZAC) 10 MG capsule Take 2 capsules by mouth daily Yes Ana Saldana MD vitamin D3 (CHOLECALCIFEROL) 25 MCG (1000 UT) TABS tablet Take 2 tablets by mouth daily Yes Marjorie Alex MD   DULoxetine (CYMBALTA) 60 MG extended release capsule TAKE 1 CAPSULE BY MOUTH EVERY DAY Yes Sandra Zuleta MD   albuterol sulfate HFA (PROAIR HFA) 108 (90 Base) MCG/ACT inhaler Inhale 2 puffs into the lungs every 6 hours as needed for Wheezing Yes Sandra Zuleta MD       Social History     Tobacco Use    Smoking status: Never Smoker    Smokeless tobacco: Never Used   Substance Use Topics    Alcohol use: Yes     Alcohol/week: 1.0 standard drinks     Types: 1 Glasses of wine per week     Comment: occasional    Drug use: No        No Known Allergies,   Past Medical History:   Diagnosis Date    Anxiety and depression 12/7/2018    Anxiety and depression     Exercise-induced asthma 5/15/2020    Patellar tendinitis of left knee     Primary osteoarthritis of left knee 5/2/2019    Seizures ([address]     last epileptic episode was 12 years ago   ,   Past Surgical History:   Procedure Laterality Date    KNEE CARTILAGE SURGERY      WRIST SURGERY      Bone spur removal   ,   Social History     Tobacco Use    Smoking status: Never Smoker    Smokeless tobacco: Never Used   Substance Use Topics    Alcohol use:  Yes     Alcohol/week: 1.0 standard drinks     Types: 1 Glasses of wine per week     Comment: occasional    Drug use: No   ,   Family History   Problem Relation Age of Onset    Diabetes Mother     High Blood Pressure Father     High Blood Pressure Brother     Cancer Maternal Grandmother         ?breast    ,   Immunization History   Administered Date(s) Administered    Influenza Vaccine, unspecified formulation 11/09/2017, 11/09/2018    Influenza Virus Vaccine 03/17/2017, 11/09/2017    Influenza, Quadv, IM, (6 mo and older Fluzone, Flulaval, Fluarix and 3 yrs and older Afluria) 11/09/2017    Influenza, Quadv, IM, PF (6 mo and older Fluzone, Flulaval, Fluarix, and 3 yrs and older Afluria) (and/or legal guardian's) consent, to reduce the patient's risk of exposure to COVID-19 and provide necessary medical care. The patient (and/or legal guardian) has also been advised to contact this office for worsening conditions or problems, and seek emergency medical treatment and/or call 911 if deemed necessary. Services were provided through a video synchronous discussion virtually to substitute for in-person clinic visit. Patient and provider were located at their individual homes. --Remi Blake MD on 5/15/2020 at 4:23 PM    An electronic signature was used to authenticate this note.

## 2020-06-12 RX ORDER — DULOXETIN HYDROCHLORIDE 60 MG/1
CAPSULE, DELAYED RELEASE ORAL
Qty: 90 CAPSULE | Refills: 0 | Status: SHIPPED | OUTPATIENT
Start: 2020-06-12 | End: 2020-09-21 | Stop reason: SDUPTHER

## 2020-06-25 ENCOUNTER — OFFICE VISIT (OUTPATIENT)
Dept: ORTHOPEDIC SURGERY | Age: 40
End: 2020-06-25
Payer: COMMERCIAL

## 2020-06-25 ENCOUNTER — TELEPHONE (OUTPATIENT)
Dept: OBGYN CLINIC | Age: 40
End: 2020-06-25

## 2020-06-25 VITALS — HEIGHT: 70 IN | BODY MASS INDEX: 41.95 KG/M2 | WEIGHT: 293 LBS

## 2020-06-25 PROCEDURE — 20610 DRAIN/INJ JOINT/BURSA W/O US: CPT | Performed by: ORTHOPAEDIC SURGERY

## 2020-06-25 PROCEDURE — 99213 OFFICE O/P EST LOW 20 MIN: CPT | Performed by: ORTHOPAEDIC SURGERY

## 2020-06-25 RX ORDER — LIDOCAINE HYDROCHLORIDE 10 MG/ML
4 INJECTION, SOLUTION INFILTRATION; PERINEURAL ONCE
Status: COMPLETED | OUTPATIENT
Start: 2020-06-25 | End: 2020-06-25

## 2020-06-25 RX ORDER — BUPIVACAINE HYDROCHLORIDE 2.5 MG/ML
4 INJECTION, SOLUTION INFILTRATION; PERINEURAL ONCE
Status: COMPLETED | OUTPATIENT
Start: 2020-06-25 | End: 2020-06-25

## 2020-06-25 RX ORDER — METHYLPREDNISOLONE ACETATE 40 MG/ML
40 INJECTION, SUSPENSION INTRA-ARTICULAR; INTRALESIONAL; INTRAMUSCULAR; SOFT TISSUE ONCE
Status: COMPLETED | OUTPATIENT
Start: 2020-06-25 | End: 2020-06-25

## 2020-06-25 RX ADMIN — METHYLPREDNISOLONE ACETATE 40 MG: 40 INJECTION, SUSPENSION INTRA-ARTICULAR; INTRALESIONAL; INTRAMUSCULAR; SOFT TISSUE at 15:42

## 2020-06-25 RX ADMIN — LIDOCAINE HYDROCHLORIDE 4 ML: 10 INJECTION, SOLUTION INFILTRATION; PERINEURAL at 15:41

## 2020-06-25 RX ADMIN — BUPIVACAINE HYDROCHLORIDE 10 MG: 2.5 INJECTION, SOLUTION INFILTRATION; PERINEURAL at 15:39

## 2020-06-25 RX ADMIN — BUPIVACAINE HYDROCHLORIDE 10 MG: 2.5 INJECTION, SOLUTION INFILTRATION; PERINEURAL at 15:40

## 2020-06-25 NOTE — PROGRESS NOTES
Treatment Plan: We will proceed with cortisone injection today. We will get her set up with physical therapy specifically to review Kinesiotape. The risks and benefits of an injection were discussed with the patient. The patient had full opportunity to ask questions and all were answered. The patient then provided verbal informed consent. The skin was then prepped with betadine solution and alcohol. Under aseptic conditions, the right and left knee was injected with 4cc of 1% lidocaine, 4cc of 0.25% marcaine and 80 mg of Depomedrol. There were no immediate complications following the injection. The patient was advised of the possibility of injection site reaction and instructed to apply ice to the area.       Josiah Yao

## 2020-06-26 ENCOUNTER — OFFICE VISIT (OUTPATIENT)
Dept: OBGYN CLINIC | Age: 40
End: 2020-06-26
Payer: COMMERCIAL

## 2020-06-26 VITALS
DIASTOLIC BLOOD PRESSURE: 72 MMHG | BODY MASS INDEX: 37.62 KG/M2 | WEIGHT: 248.2 LBS | TEMPERATURE: 97.3 F | HEART RATE: 70 BPM | HEIGHT: 68 IN | SYSTOLIC BLOOD PRESSURE: 116 MMHG

## 2020-06-26 LAB
BASOPHILS ABSOLUTE: 0 K/UL (ref 0–0.2)
BASOPHILS RELATIVE PERCENT: 0.4 %
EOSINOPHILS ABSOLUTE: 0 K/UL (ref 0–0.6)
EOSINOPHILS RELATIVE PERCENT: 0 %
HCT VFR BLD CALC: 38.5 % (ref 36–48)
HEMOGLOBIN: 12.7 G/DL (ref 12–16)
LYMPHOCYTES ABSOLUTE: 1.3 K/UL (ref 1–5.1)
LYMPHOCYTES RELATIVE PERCENT: 14.5 %
MCH RBC QN AUTO: 28.7 PG (ref 26–34)
MCHC RBC AUTO-ENTMCNC: 33.1 G/DL (ref 31–36)
MCV RBC AUTO: 86.5 FL (ref 80–100)
MONOCYTES ABSOLUTE: 0.6 K/UL (ref 0–1.3)
MONOCYTES RELATIVE PERCENT: 6.2 %
NEUTROPHILS ABSOLUTE: 7.3 K/UL (ref 1.7–7.7)
NEUTROPHILS RELATIVE PERCENT: 78.9 %
PDW BLD-RTO: 13.3 % (ref 12.4–15.4)
PLATELET # BLD: 250 K/UL (ref 135–450)
PMV BLD AUTO: 10.1 FL (ref 5–10.5)
RBC # BLD: 4.45 M/UL (ref 4–5.2)
WBC # BLD: 9.2 K/UL (ref 4–11)

## 2020-06-26 PROCEDURE — 99385 PREV VISIT NEW AGE 18-39: CPT | Performed by: OBSTETRICS & GYNECOLOGY

## 2020-06-26 PROCEDURE — 36415 COLL VENOUS BLD VENIPUNCTURE: CPT | Performed by: OBSTETRICS & GYNECOLOGY

## 2020-06-26 NOTE — PROGRESS NOTES
Last PAP was normal; December/2017.    Abnormal pap history? no  Last HPV screen: 2017 negative  Patient has never had a mammogram.  Last Dexa Scan: N/A   Contraceptive method: Abstinence  No prior colonoscopy

## 2020-06-26 NOTE — PROGRESS NOTES
Annual Exam      CC:   Chief Complaint   Patient presents with    Gynecologic Exam    Established New Doctor       HPI:  42 y.o. presents for her gynecologicannual exam.    Patient seen and examined. Patient with complaints of mood changes surrounding menses. States that 2 days prior to menses she develops significant depression, usually resolves with the start of menses, however will occasionally last through her entire cycle. Patient is currently managed with Cymbalta and was started on Prozac 1 month ago. States that with the addition of the Prozac she has noticed somewhat of an improvement with her last cycle, however still present. Menses are regular, occur monthly. Last for 5 days. Reports she will go through a super tampon in 3-4 hours with the passage of clots. Cramping and low back pain during cycles as well as fatigue. Has never been on hormones. Last pap smear 2 years ago. Denies history of abnormal pap smears. Denies hx of STIs. 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 She had a lumbar puncture with normal pressures. Denies tobacco use. 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 History of seizures as a child, has not had any for 15 years. Family history significant for VTE in both sisters and a maternal grandmother with breast cancer. Health Maintenance:  Birth control: Abstinence  Pregnancy plans: None  Safe relationship: Single    Screening:  Last pap smear: December 2017  History of abnormal pap smears: Denies    Review of Systems:   Review of Systems   Constitutional: Negative for chills and fever. HENT: Negative for congestion, sinus pressure, sneezing and sore throat. Respiratory: Negative for cough and shortness of breath. Cardiovascular: Negative for chest pain and palpitations. Gastrointestinal: Negative for abdominal pain, constipation, diarrhea, nausea and vomiting. Genitourinary: Positive for menstrual problem and vaginal bleeding. Negative for dysuria, frequency, pelvic pain and vaginal discharge. Musculoskeletal: Positive for back pain. Neurological: Positive for headaches. Hx of pseudotumor       Primary Care Physician: Meka Campos MD    Obstetric History  OB History   No obstetric history on file. GynecologicHistory  Menstrual History:   LMP: Patient's last menstrual period was 06/21/2020 (exact date).  Age of Menarche: 15-12  [facility] Menstrual Period: regular   Interval Between Menses: Monthly   Duration of Menses: 5 days   Menstrual Flow: Moderate to heavy   Bleeding between menses: Denies    Sexual History:   Contraception: see above   Currently is not sexually active   2 Lifetime partners   Denies history of STIs   Denies sexual problems    Pap History:   History of abnormal pap smears: see above   Last pap: see above      Medical History:  Past Medical History:   Diagnosis Date    Anxiety and depression 12/7/2018    Anxiety and depression     Exercise-induced asthma 5/15/2020    Menopausal symptoms     Neurologic disorder     Patellar tendinitis of left knee     Primary osteoarthritis of left knee 5/2/2019    Seizures ([address]     last epileptic episode was 12 years ago       Medications:  Current Outpatient Medications   Medication Sig Dispense Refill    diclofenac sodium (VOLTAREN) 1 % GEL Apply 2 g topically 4 times daily 2 Tube 3    DULoxetine (CYMBALTA) 60 MG extended release capsule TAKE 1 CAPSULE BY MOUTH EVERY DAY 90 capsule 0    FLUoxetine (PROZAC) 10 MG capsule Take 2 capsules by mouth daily 90 capsule 1    vitamin D3 (CHOLECALCIFEROL) 25 MCG (1000 UT) TABS tablet Take 2 tablets by mouth daily 180 tablet 1    albuterol sulfate HFA (PROAIR HFA) 108 (90 Base) MCG/ACT inhaler Inhale 2 puffs into the lungs every 6 hours as needed for Wheezing 1 Inhaler 2     No current facility-administered medications for this visit. Surgical History:  Past Surgical History:   Procedure Laterality Date    KNEE CARTILAGE SURGERY      WRIST SURGERY      Bone spur removal       Allergies:  No Known Allergies    Family History:  Family History   Problem Relation Age of Onset    Diabetes Mother     High Blood Pressure Father     High Blood Pressure Brother     Cancer Maternal Grandmother         ?breast        Reports personal/family history of cervical, uterine, ovarian, vulvar, breast, or colon cancers. - Maternal grandmother - breast cancer - unknown age  Reports personal/family history of bleeding or clotting disorders     - Both sisters with VTE, reports clotting dysfunction that required \"shots during pregnancy. \"  Unknown personal/family history of genetic disorders    Social History:  Social History     Socioeconomic History    Marital status: Single     Spouse name: None    Number of children: None    Years of education: None    Highest education level: None   Occupational History    None   Social Needs    Financial resource strain: None    Food insecurity     Worry: None     Inability: None    Transportation needs     Medical: None     Non-medical: None   Tobacco Use    Smoking status: Never Smoker    Smokeless tobacco: Never Used   Substance and Sexual Activity    Alcohol use:  Yes     Alcohol/week: 1.0 standard drinks     Types: 1 Glasses of wine per week     Comment: occasional    Drug use: No    Sexual activity: Not Currently     Partners: Male   Lifestyle    Physical activity     Days per week: None     Minutes per session: None    Stress: None   Relationships    Social connections     Talks on phone: None     Gets together: None     Attends Anabaptism service: None     Active member of club or organization: None     Attends meetings of clubs or organizations: None     Relationship status: None    Intimate partner violence     Fear of current or ex partner: None     Emotionally abused: None     Physically abused: None     Forced sexual activity: None   Other Topics Concern    None   Social History Narrative    None       Objective: Body mass index is 37.74 kg/m². /72 (Site: Left Upper Arm, Position: Sitting, Cuff Size: Large Adult)   Pulse 70   Temp 97.3 °F (36.3 °C) (Oral)   Ht 5' 8\" (1.727 m)   Wt 248 lb 3.2 oz (112.6 kg)   LMP 06/21/2020 (Exact Date)   Breastfeeding No   BMI 37.74 kg/m²     Exam:   Physical Exam  Vitals signs reviewed. Exam conducted with a chaperone present. Constitutional:       General: She is not in acute distress. Appearance: She is well-developed. She is obese. HENT:      Head: Normocephalic and atraumatic. Eyes:      Extraocular Movements: Extraocular movements intact. Conjunctiva/sclera: Conjunctivae normal.   Neck:      Musculoskeletal: Normal range of motion and neck supple. Thyroid: No thyromegaly. Cardiovascular:      Rate and Rhythm: Normal rate. Pulmonary:      Effort: Pulmonary effort is normal.   Chest:      Breasts:         Right: No mass, nipple discharge, skin change or tenderness. Left: No mass, nipple discharge, skin change or tenderness. Abdominal:      General: There is no distension. Palpations: Abdomen is soft. There is no mass. Tenderness: There is no abdominal tenderness. There is no guarding or rebound. Genitourinary:     General: Normal vulva. Labia:         Right: No rash, tenderness or lesion. Left: No rash, tenderness or lesion. Vagina: No vaginal discharge, erythema, tenderness, bleeding or lesions. Cervix: No cervical motion tenderness, discharge, friability, lesion, erythema or cervical bleeding. Uterus: Not deviated, not enlarged, not fixed and not tender. Adnexa:         Right: No mass, tenderness or fullness. Left: No mass, tenderness or fullness. Musculoskeletal:         General: No swelling. Skin:     General: Skin is warm and dry.    Neurological: Mental Status: She is alert and oriented to person, place, and time. Psychiatric:         Mood and Affect: Mood normal.         Behavior: Behavior normal.         Thought Content: Thought content normal.         Assessment/Plan:  42 y.o. presenting for her annual exam:    1. Encntr for gyn exam (general) (routine) w abnormal findings     - Pap smear collected today - will call with results     - Age based screening recommendations discussed     - Self breast exams/awareness discussed with the patient     - Healthy lifestyle habits discussed including calcium and vitamin D supplementation     - Will follow-up in 1 year for annual exam     2. Pap smear for cervical cancer screening     - Pap smear collected today - will call with results     - Age based screening recommendations discussed     - Will follow-up in 1 year for annual exam     3. Menorrhagia with regular cycle     - Reviewed etiologies of heavy menses     - Will order labs for hormonal influence and CBC to evaluate for anemia   - TSH with Reflex   - Hemoglobin A1C   - CBC Auto Differential     - Risks, benefits and alternatives of management reviewed. - Reviewed likely avoidance of estrogen in management given history of pseudotumor and family history of VTE.      - Patient is going to review with her sisters and find out if genetic clotting dysfunction - will test based on indications     - Will follow-up in 1 month for ultrasound and possible EMB    4. Depression     - Reviewed association with menses, regulation of menses may improve     - With start of Prozac 1 month ago and improvement, will continue to monitor for continued improvement    5. Fatigue, unspecified type     - TSH with Reflex     - CBC Auto Differential    6.  Screening for diabetes mellitus     - Hemoglobin A1C      Rajiv Ernandez DO

## 2020-06-27 LAB
ESTIMATED AVERAGE GLUCOSE: 102.5 MG/DL
HBA1C MFR BLD: 5.2 %
TSH REFLEX: 1.3 UIU/ML (ref 0.27–4.2)

## 2020-06-30 ENCOUNTER — HOSPITAL ENCOUNTER (OUTPATIENT)
Dept: PHYSICAL THERAPY | Age: 40
Setting detail: THERAPIES SERIES
Discharge: HOME | End: 2020-06-30
Payer: COMMERCIAL

## 2020-06-30 PROCEDURE — 97161 PT EVAL LOW COMPLEX 20 MIN: CPT

## 2020-06-30 PROCEDURE — 97110 THERAPEUTIC EXERCISES: CPT

## 2020-06-30 PROCEDURE — 97112 NEUROMUSCULAR REEDUCATION: CPT

## 2020-06-30 NOTE — FLOWSHEET NOTE
[address] [place] [address] [facility], [address] [place] [address] [address]  Phone: [phone]   Fax:     [phone]      Physical Therapy Treatment Note/ Progress Report:           Date:  2020    Patient Name:  Larry Lee    :  1980  MRN: [id number]  Restrictions/Precautions:    Medical/Treatment Diagnosis Information:  · Diagnosis: M25.561 (ICD-10-CM) - Right knee pain, unspecified chronicity  · Treatment Diagnosis: Right knee pain  Insurance/Certification information:  PT Insurance Information: [employer] [place] $60 CP, 20 PCY, no auth  Physician Information:  Referring Practitioner: Tali Karimi DO  Has the plan of care been signed (Y/N):        []  Yes  []  No     Date of Patient follow up with Physician:     Assessment Summary: Yanick Ta is a 42 y.o. female reporting to OP PT with c/c of bilateral knee pain which has been occurring for a few years and increasing. Pt is noted to have symmetrical ROM with mild muscle tightness. Strength testing normal but has difficulty completing functional tasks secondary to pain.        Is this a Progress Report:     []  Yes  [x]  No        If Yes:  Date Range for reporting period:  Beginning 20  Ending     Progress report will be due (10 Rx or 30 days whichever is less):        Recertification will be due (POC Duration  / 90 days whichever is less): 20         Visit # Insurance Allowable Auth Required    []  Yes []  No        Functional Scale: LEFS 33%    Date assessed:        Latex Allergy:  [x]NO      []YES  Preferred Language for Healthcare:   [x]English       []other:      Pain level:  5/10     SUBJECTIVE:  see eval    OBJECTIVE: see eval      RESTRICTIONS/PRECAUTIONS: None    Exercises/Interventions:   Therapeutic Ex (86442) HEP 20     Warm-up       Rec bike              TABLE       Bridge x 10x2     SLR x X15, B, 5\"     Prone quad stretch x 1' B                   SEATED STANDING       HS stretch x 1' B     Gastroc stretch x 1' B     Sit<>stands x 10x2     Hip flexor stretch x 1' B                                                                                    Manual (64326): Prone quad stretch    Therapeutic Exercise and NMR EXR  [x] (54407) Provided verbal/tactile cueing for activities related to strengthening, flexibility, endurance, ROM for improvements in LE, proximal hip, and core control with self care, mobility, lifting, ambulation. [x] (84868) Provided verbal/tactile cueing for activities related to improving balance, coordination, kinesthetic sense, posture, motor skill, proprioception to assist with LE, proximal hip, and core control in self-care, mobility, lifting, ambulation and eccentric single leg control.      NMR and Therapeutic Activities:    [x] (19142 or 00373) Provided verbal/tactile cueing for activities related to improving balance, coordination, kinesthetic sense, posture, motor skill, proprioception and motor activation to allow for proper function of core, proximal hip and LE with self-care and ADLs and functional mobility.   [] (67774) Gait Re-education- Provided training and instruction to the patient for proper LE, core and proximal hip recruitment and positioning and eccentric body weight control with ambulation re-education including up and down stairs     Home Exercise Program:    [x] (16341) Reviewed/Progressed HEP activities related to strengthening, flexibility, endurance, ROM of core, proximal hip and LE for functional self-care, mobility, lifting and ambulation/stair navigation   [] (81626) Reviewed/Progressed HEP activities related to improving balance, coordination, kinesthetic sense, posture, motor skill, proprioception of core, proximal hip and LE for self-care, mobility, lifting, and ambulation/stair navigation      Manual Treatments:  PROM / STM / Oscillations-Mobs:  G-I, II, III, IV (PA's, Inf., Post.)  [] (96040) Provided manual therapy to mobilize LE, proximal hip and/or LS spine soft tissue/joints for the purpose of modulating pain, promoting relaxation, increasing ROM, reducing/eliminating soft tissue swelling/inflammation/restriction, improving soft tissue extensibility and allowing for proper ROM for normal function with self-care, mobility, lifting and ambulation. Modalities:     [] GAME READY (VASO)- for significant edema, swelling, pain control. Charges:  Timed Code Treatment Minutes: 25   Total Treatment Minutes: 45      [x] EVAL (LOW) 88472 (typically 20 minutes face-to-face)  [] EVAL (MOD) 22715 (typically 30 minutes face-to-face)  [] EVAL (HIGH) 25330 (typically 45 minutes face-to-face)  [] RE-EVAL     [x] QF(29520) x     [] IONTO  [x] NMR (18676) x     [] VASO  [] Manual (69447) x     [] Other:  [] TA x      [] Mech Traction (91100)  [] ES(attended) (70833)      [] ES (un) (33456):    GOALS:       Patient stated goal: Reduce pain    Therapist goals for Patient:   Short Term Goals: To be achieved in: 2 weeks  1. Independent in HEP and progression per patient tolerance, in order to prevent re-injury. [] Progressing: [] Met: [] Not Met: [] Adjusted     2. Patient will have a decrease in pain to facilitate improvement in movement, function, and ADLs as indicated by Functional Deficits. [] Progressing: [] Met: [] Not Met: [] Adjusted     Long Term Goals: To be achieved in: 12 weeks  1. Disability index score of 13% or less for the LEFS to assist with reaching prior level of function. [] Progressing: [] Met: [] Not Met: [] Adjusted     2. Patient will demonstrate an increase in Strength to good proximal hip strength and control, within 5lb HHD in LE to allow for proper functional mobility as indicated by patients Functional Deficits. [] Progressing: [] Met: [] Not Met: [] Adjusted     3. Patient will return to functional activities without increased symptoms or restriction.    [] Progressing: [] Met: [] Not Met: [] Adjusted     3. Pt will be able to ascend stairs without symptoms. (patient specific functional goal)    [] Progressing: [] Met: [] Not Met: [] Adjusted     ASSESSMENT:  See eval    Patient received education on their current pathology and how their condition effects them with their functional activities. Patient understood discussion and questions were answered. Patient understands their activity limitations and understands rational for treatment progression. Pt educated on plan of care and HEP, if worsening symptoms to d/c that exercise. Overall Progression Towards Functional goals/ Treatment Progress Update:  [] Patient is progressing as expected towards functional goals listed. [] Progression is slowed due to complexities/Impairments listed. [] Progression has been slowed due to co-morbidities. [x] Plan just implemented, too soon to assess goals progression <30days   [] Goals require adjustment due to lack of progress  [] Patient is not progressing as expected and requires additional follow up with physician  [] Other    Prognosis for POC: [x] Good [] Fair  [] Poor      Patient requires continued skilled intervention: [x] Yes  [] No    Treatment/Activity Tolerance:  [x] Patient able to complete treatment  [] Patient limited by fatigue  [] Patient limited by pain    [] Patient limited by other medical complications  [] Other:         PLAN: See eval  [] Continue per plan of care [] Alter current plan (see comments above)  [x] Plan of care initiated [] Hold pending MD visit [] Discharge      Electronically signed by:  Griffin Chaudhari PT    Note: If patient does not return for scheduled/ recommended follow up visits, this note will serve as a discharge from care along with most recent update on progress.

## 2020-06-30 NOTE — PLAN OF CARE
[address] [facility], [address] [facility], [address] [facility] [address]  Phone: [phone]   Fax:     [phone]       Physical Therapy Certification    Dear Referring Practitioner: Mckay Lozano DO,    We had the pleasure of evaluating the following patient for physical therapy services at [address] [place] [address]. A summary of our findings can be found in the initial assessment below. This includes our plan of care. If you have any questions or concerns regarding these findings, please do not hesitate to contact me at the office phone number checked above. Thank you for the referral.       Physician Signature:_______________________________Date:__________________  By signing above (or electronic signature), therapists plan is approved by physician      Patient: Ervin Vivas   : 1980   MRN: [id number]  Referring Physician: Referring Practitioner: Mckay Lozano DO      Evaluation Date: 2020      Medical Diagnosis Information:  Diagnosis: M25.561 (ICD-10-CM) - Right knee pain, unspecified chronicity   Treatment Diagnosis: Right knee pain                                         Insurance information: PT Insurance Information: [place] $60 CP, 20 PCY, no auth     Precautions/ Contra-indications: None  Latex Allergy:  [x]NO      []YES  Preferred Language for Healthcare:   [x]English       []other:    SUBJECTIVE: Patient stated complaint: Pt states has been having left knee pain for a couple of years, right knee has been hurting since high school but increased 2-3 months ago without JEFF. Has difficulty standing longer period of times. Difficulty with stairs especially ascending. Standing from seated position also painful. Had steroid injections recently which has helped some. Relevant Medical History: None    Pain Scale: Current: 5/10; Max 10/10;  Best 0/10    Easing factors: Rest    Provocative factors: Movements, standing, sit<>stands, stairs. Type: []Constant   [x]Intermittent  []Radiating []Localized []other:     Numbness/Tingling: None     Occupation/School: . Living Status/Prior Level of Function: Independent with ADLs and IADLs. OBJECTIVE:     ROM RIGHT LEFT        Knee Extension 0 0   Knee Flexion 115 115        Popliteal angle 105 105   Rectus femoris 160 160        Strength  RIGHT LEFT   Hip Flexors 5 5   Hip Abductors 5 5   Hip Extension 5 5        Knee Extension 5 5   Knee Flexion 5 5          Reflexes/Sensation:    [x]Dermatomes/Myotomes intact    [x]Reflexes equal and normal bilaterally   []Other:    Joint mobility:   [x]Normal    []Hypo   []Hyper    Palpation: ttp pes anserine, patellar tendon    Functional Mobility/Transfers: Independent    Posture: WNL    Bandages/Dressings/Incisions: None    Gait: (include devices/WB status) WNL    Orthopedic Special Tests: None                       [x] Patient history, allergies, meds reviewed. Medical chart reviewed. See intake form. Review Of Systems (ROS):  [x]Performed Review of systems (Integumentary, CardioPulmonary, Neurological) by intake and observation. Intake form has been scanned into medical record. Patient has been instructed to contact their primary care physician regarding ROS issues if not already being addressed at this time.       Co-morbidities/Complexities (which will affect course of rehabilitation):   []None           Arthritic conditions   []Rheumatoid arthritis (M05.9)  []Osteoarthritis (M19.91)   Cardiovascular conditions   []Hypertension (I10)  []Hyperlipidemia (E78.5)  []Angina pectoris (I20)  []Atherosclerosis (I70)   Musculoskeletal conditions   []Disc pathology   []Congenital spine pathologies   []Prior surgical intervention  []Osteoporosis (M81.8)  []Osteopenia (M85.8)   Endocrine conditions   []Hypothyroid (E03.9)  []Hyperthyroid Gastrointestinal conditions   []Constipation (R20.38)   Metabolic conditions   [x]Morbid obesity (E66.01)  []Diabetes type 1(E10.65) or 2 (E11.65)   []Neuropathy (G60.9)     Pulmonary conditions   []Asthma (J45)  []Coughing   []COPD (J44.9)   Psychological Disorders  []Anxiety (F41.9)  []Depression (F32.9)   []Other:   []Other:          Barriers to/and or personal factors that will affect rehab potential:              []Age  []Sex              []Motivation/Lack of Motivation                        [x]Co-Morbidities              []Cognitive Function, education/learning barriers              []Environmental, home barriers              []profession/work barriers  []past PT/medical experience  []other:  Justification: None    Falls Risk Assessment (30 days):   [x] Falls Risk assessed and no intervention required. [] Falls Risk assessed and Patient requires intervention due to being higher risk   TUG score (>12s at risk):     [] Falls education provided, including           Functional Questionnaire: LEFS 33%        ASSESSMENT: Kitty Solis is a 42 y.o. female reporting to OP PT with c/c of bilateral knee pain which has been occurring for a few years and increasing. Pt is noted to have symmetrical ROM with mild muscle tightness. Strength testing normal but has difficulty completing functional tasks secondary to pain.          Functional Impairments:     [x]Noted lumbar/proximal hip/LE joint hypomobility   [x]Decreased LE functional ROM   []Decreased core/proximal hip strength and neuromuscular control   [x]Decreased LE functional strength   []Reduced balance/proprioceptive control   []other:      Functional Activity Limitations (from functional questionnaire and intake)   []Reduced ability to tolerate prolonged functional positions   []Reduced ability or difficulty with changes of positions or transfers between positions   []Reduced ability to maintain good posture and demonstrate good body mechanics with sitting, bending, and lifting   []Reduced ability to sleep   [] Reduced ability or tolerance with driving and/or computer work   [x]Reduced ability to perform lifting, carrying tasks   [x]Reduced ability to squat   []Reduced ability to forward bend   [x]Reduced ability to ambulate prolonged functional periods/distances/surfaces   [x]Reduced ability to ascend/descend stairs   []Reduced ability to run, hop, cut or jump   []other:    Participation Restrictions   []Reduced participation in self care activities   [x]Reduced participation in home management activities   []Reduced participation in work activities   [x]Reduced participation in social activities. []Reduced participation in sport/recreation activities. Classification :    []Signs/symptoms consistent with post-surgical status including decreased ROM, strength and function.    []Signs/symptoms consistent with joint sprain/strain   []Signs/symptoms consistent with patella-femoral syndrome   [x]Signs/symptoms consistent with knee OA   []Signs/symptoms consistent with internal derangement of knee/Hip   []Signs/symptoms consistent with functional hip weakness/NMR control      []Signs/symptoms consistent with tendinitis/tendinosis    []signs/symptoms consistent with pathology which may benefit from Dry needling      []other:      Prognosis/Rehab Potential:      []Excellent   [x]Good    []Fair   []Poor    Tolerance of evaluation/treatment:    []Excellent   [x]Good    []Fair   []Poor    Physical Therapy Evaluation Complexity Justification  [x] A history of present problem with:  [x] no personal factors and/or comorbidities that impact the plan of care;  []1-2 personal factors and/or comorbidities that impact the plan of care  []3 personal factors and/or comorbidities that impact the plan of care  [x] An examination of body systems using standardized tests and measures addressing any of the following: body structures and functions (impairments), activity limitations, and/or [] Not Met: [] Adjusted     2. Patient will demonstrate an increase in Strength to good proximal hip strength and control, within 5lb HHD in LE to allow for proper functional mobility as indicated by patients Functional Deficits. [] Progressing: [] Met: [] Not Met: [] Adjusted     3. Patient will return to functional activities without increased symptoms or restriction. [] Progressing: [] Met: [] Not Met: [] Adjusted     3.  Pt will be able to ascend stairs without symptoms. (patient specific functional goal)    [] Progressing: [] Met: [] Not Met: [] Adjusted      Electronically signed by:  Omega Lugo, PT

## 2020-07-01 ASSESSMENT — ENCOUNTER SYMPTOMS
VOMITING: 0
NAUSEA: 0
SORE THROAT: 0
DIARRHEA: 0
SHORTNESS OF BREATH: 0
BACK PAIN: 1
COUGH: 0
SINUS PRESSURE: 0
CONSTIPATION: 0
ABDOMINAL PAIN: 0

## 2020-07-06 ENCOUNTER — NURSE TRIAGE (OUTPATIENT)
Dept: OTHER | Facility: CLINIC | Age: 40
End: 2020-07-06

## 2020-07-06 NOTE — TELEPHONE ENCOUNTER
Reason for Disposition   MODERATE back pain (e.g., interferes with normal activities) and present > 3 days    Answer Assessment - Initial Assessment Questions  1. ONSET: \"When did the pain begin? \"       Yesterday while driving. It was worse this morning when she woke up. 2. LOCATION: \"Where does it hurt? \" (upper, mid or lower back)      Lower back pain going into the hips and sometimes into the butt. The right side is worse than the left. It is hard to sit long periods of time. Leaning forward while sitting will help a little. 3. SEVERITY: \"How bad is the pain? \"  (e.g., Scale 1-10; mild, moderate, or severe)    - MILD (1-3): doesn't interfere with normal activities     - MODERATE (4-7): interferes with normal activities or awakens from sleep     - SEVERE (8-10): excruciating pain, unable to do any normal activities       6/10,  Is able to walk to the bathroom and to get something to eat or drink. 4. PATTERN: \"Is the pain constant? \" (e.g., yes, no; constant, intermittent)       Constant. Once she is standing or lying down she is fine. Getting up and sitting is difficult. 5. RADIATION: \"Does the pain shoot into your legs or elsewhere? \"      Into hips and butt    6. CAUSE:  \"What do you think is causing the back pain? \"       Combination of her weight and being more active this weekend. Prior to this weekend her back was already tender to the touch. 7. BACK OVERUSE:  Lemond Zamzam recent lifting of heavy objects, strenuous work or exercise? \"      Active weekend    8. MEDICATIONS: \"What have you taken so far for the pain? \" (e.g., nothing, acetaminophen, NSAIDS)      Has not tried anything yet. Has not tried Ice or heat yet. 9. NEUROLOGIC SYMPTOMS: \"Do you have any weakness, numbness, or problems with bowel/bladder control? \"      Denies. But does feel she has to use the restroom more frequent. But when she goes she does not always have to go. But is still going the normal amount of times.     10. OTHER SYMPTOMS: \"Do you have any other symptoms? \" (e.g., fever, abdominal pain, burning with urination, blood in urine)        Denies    11. PREGNANCY: \"Is there any chance you are pregnant? \" (e.g., yes, no; LMP)        No    Protocols used: BACK PAIN-ADULT-OH    Pod 1    Is able to do a virtual visit    Received call from [address]. Call soft transferred to [address] to schedule appointment. Please do not reply to the triage nurse through this encounter. Any subsequent communication should be directly with the patient.

## 2020-07-09 ENCOUNTER — TELEMEDICINE (OUTPATIENT)
Dept: FAMILY MEDICINE CLINIC | Age: 40
End: 2020-07-09
Payer: COMMERCIAL

## 2020-07-09 ENCOUNTER — HOSPITAL ENCOUNTER (OUTPATIENT)
Dept: GENERAL RADIOLOGY | Age: 40
Discharge: HOME | End: 2020-07-09
Payer: COMMERCIAL

## 2020-07-09 ENCOUNTER — HOSPITAL ENCOUNTER (OUTPATIENT)
Age: 40
Discharge: HOME | End: 2020-07-09
Payer: COMMERCIAL

## 2020-07-09 PROCEDURE — 99213 OFFICE O/P EST LOW 20 MIN: CPT | Performed by: PHYSICIAN ASSISTANT

## 2020-07-09 PROCEDURE — 72100 X-RAY EXAM L-S SPINE 2/3 VWS: CPT

## 2020-07-09 RX ORDER — CYCLOBENZAPRINE HCL 5 MG
5 TABLET ORAL 2 TIMES DAILY PRN
Qty: 40 TABLET | Refills: 0 | Status: SHIPPED | OUTPATIENT
Start: 2020-07-09 | End: 2020-07-29

## 2020-07-09 RX ORDER — TRAZODONE HYDROCHLORIDE 50 MG/1
50 TABLET ORAL NIGHTLY
COMMUNITY
End: 2021-01-22 | Stop reason: ALTCHOICE

## 2020-07-09 ASSESSMENT — ENCOUNTER SYMPTOMS
BACK PAIN: 1
ABDOMINAL PAIN: 0
COLOR CHANGE: 0

## 2020-07-09 NOTE — PROGRESS NOTES
2020    TELEHEALTH EVALUATION -- Audio/Visual (During EMSUZ-02 public health emergency)    HPI:    Billy Andre (:  1980) has requested an audio/video evaluation for the following concern(s):    Back Pain: Symptoms started 2-3 months ago and seem to be getting progressively worse. Location:  Midline lumbar region, radiating into bilateral hips and glutes. Pain is a constant dull ache with occasional sharp and shooting pain. Her midline lumbar region is tender to touch. She denies any numbness, weakness, color or temperature change in her extremities. Gait is unaffected. Aggravated by sitting. She is not taking NSAIDs or tylenol at this time. Uses ice or heat. No prior imaging. No known injury. Review of Systems   Constitutional: Negative for activity change and unexpected weight change. Gastrointestinal: Negative for abdominal pain. Genitourinary: Negative for pelvic pain. Musculoskeletal: Positive for back pain and myalgias. Negative for gait problem. Skin: Negative for color change. Neurological: Negative for weakness and numbness. Prior to Visit Medications    Medication Sig Taking?  Authorizing Provider   traZODone (DESYREL) 50 MG tablet Take 50 mg by mouth nightly Yes Historical Provider, MD   cyclobenzaprine (FLEXERIL) 5 MG tablet Take 1 tablet by mouth 2 times daily as needed for Muscle spasms Yes RUBIN Perea   diclofenac sodium (VOLTAREN) 1 % GEL Apply 2 g topically 4 times daily Yes Maggy Webster DO   DULoxetine (CYMBALTA) 60 MG extended release capsule TAKE 1 CAPSULE BY MOUTH EVERY DAY Yes Joanie Green MD   FLUoxetine (PROZAC) 10 MG capsule Take 2 capsules by mouth daily Yes Joanie Green MD   vitamin D3 (CHOLECALCIFEROL) 25 MCG (1000 UT) TABS tablet Take 2 tablets by mouth daily Yes Joanie Green MD   albuterol sulfate HFA (PROAIR HFA) 108 (90 Base) MCG/ACT inhaler Inhale 2 puffs into the lungs every 6 hours as needed for Wheezing Yes Daisy Guillaume Susan Wu MD       Social History     Tobacco Use    Smoking status: Never Smoker    Smokeless tobacco: Never Used   Substance Use Topics    Alcohol use: Yes     Alcohol/week: 1.0 standard drinks     Types: 1 Glasses of wine per week     Comment: occasional    Drug use: No        No Known Allergies,   Past Medical History:   Diagnosis Date    Anxiety and depression 12/7/2018    Anxiety and depression     Exercise-induced asthma 5/15/2020    Menopausal symptoms     Neurologic disorder     Patellar tendinitis of left knee     Primary osteoarthritis of left knee 5/2/2019    Seizures ([address]     last epileptic episode was 12 years ago   ,   Past Surgical History:   Procedure Laterality Date    KNEE CARTILAGE SURGERY      WRIST SURGERY      Bone spur removal   ,   Social History     Tobacco Use    Smoking status: Never Smoker    Smokeless tobacco: Never Used   Substance Use Topics    Alcohol use: Yes     Alcohol/week: 1.0 standard drinks     Types: 1 Glasses of wine per week     Comment: occasional    Drug use: No       PHYSICAL EXAMINATION:  [ INSTRUCTIONS:  \"[x]\" Indicates a positive item  \"[]\" Indicates a negative item  -- DELETE ALL ITEMS NOT EXAMINED]  Vital Signs: (As obtained by patient/caregiver or practitioner observation)    Blood pressure-  Heart rate-    Respiratory rate- 14   Temperature-  Pulse oximetry-     Constitutional: [x] Appears well-developed and well-nourished [x] No apparent distress      [] Abnormal-   Mental status  [x] Alert and awake  [x] Oriented to person/place/time [x]Able to follow commands      Eyes:  EOM    [x]  Normal  [] Abnormal-  Sclera  []  Normal  [] Abnormal -         Discharge []  None visible  [] Abnormal -    HENT:   [] Normocephalic, atraumatic.   [x] Abnormal   [x] Mouth/Throat: Mucous membranes are moist.     External Ears [] Normal  [] Abnormal-     Neck: [x] No visualized mass     Pulmonary/Chest: [x] Respiratory effort normal.  [x] No visualized signs of difficulty breathing or respiratory distress        [] Abnormal-      Musculoskeletal:   [] Normal gait with no signs of ataxia         [x] Normal range of motion of neck        [x] Abnormal- Pt reports tenderness with palpation of midline lumbar region. Increase shooting pain with rotation of lumbar spine      Neurological:        [] No Facial Asymmetry (Cranial nerve 7 motor function) (limited exam to video visit)          [] No gaze palsy        [] Abnormal-         Skin:        [] No significant exanthematous lesions or discoloration noted on facial skin         [] Abnormal-            Psychiatric:       [] Normal Affect [] No Hallucinations        [] Abnormal-     Other pertinent observable physical exam findings-     ASSESSMENT/PLAN:  1. Chronic midline low back pain with bilateral sciatica  -  Will review imagine. Most likely will need physical therapy. Encouraged continued use of ice for pain. - XR LUMBAR SPINE (2-3 VIEWS); Future  - cyclobenzaprine (FLEXERIL) 5 MG tablet; Take 1 tablet by mouth 2 times daily as needed for Muscle spasms  Dispense: 40 tablet; Refill: 0      No follow-ups on file. Devi Meyer is a 42 y.o. female being evaluated by a Virtual Visit (video visit) encounter to address concerns as mentioned above. A caregiver was present when appropriate. Due to this being a TeleHealth encounter ([facility]), evaluation of the following organ systems was limited: Vitals/Constitutional/EENT/Resp/CV/GI//MS/Neuro/Skin/Heme-Lymph-Imm. Pursuant to the emergency declaration under the [facility], [address] [place] [address] authority and the [employer] Act, this Virtual Visit was conducted with patient's (and/or legal guardian's) consent, to reduce the patient's risk of exposure to COVID-19 and provide necessary medical care.   The patient (and/or legal guardian) has also been advised to contact this office for worsening conditions or problems, and seek emergency medical treatment and/or call 911 if deemed necessary. Patient identification was verified at the start of the visit: Yes    Total time spent on this encounter: Not billed by time    Services were provided through a video synchronous discussion virtually to substitute for in-person clinic visit. Patient and provider were located at their individual homes. --RUBIN Heath on 7/9/2020 at 9:53 AM    An electronic signature was used to authenticate this note.

## 2020-07-21 ENCOUNTER — HOSPITAL ENCOUNTER (OUTPATIENT)
Dept: PHYSICAL THERAPY | Age: 40
Setting detail: THERAPIES SERIES
Discharge: HOME | End: 2020-07-21
Payer: COMMERCIAL

## 2020-07-29 ENCOUNTER — OFFICE VISIT (OUTPATIENT)
Dept: OBGYN CLINIC | Age: 40
End: 2020-07-29
Payer: COMMERCIAL

## 2020-07-29 VITALS
HEART RATE: 99 BPM | SYSTOLIC BLOOD PRESSURE: 112 MMHG | TEMPERATURE: 97.2 F | DIASTOLIC BLOOD PRESSURE: 80 MMHG | WEIGHT: 293 LBS | BODY MASS INDEX: 52.73 KG/M2

## 2020-07-29 PROCEDURE — 99213 OFFICE O/P EST LOW 20 MIN: CPT | Performed by: OBSTETRICS & GYNECOLOGY

## 2020-07-29 PROCEDURE — 76856 US EXAM PELVIC COMPLETE: CPT | Performed by: OBSTETRICS & GYNECOLOGY

## 2020-07-29 NOTE — PROGRESS NOTES
Return Gyn Office Visit    CC:   Chief Complaint   Patient presents with    Follow-up       HPI:  Crissy Corbett is a 42 y.o. female who presents for US evaluation for heavy menses. Patient is seen and examined today. Patient reports last menses was heavy as well. Denies dizziness or lightheadedness. Reports mood changes prior to menses have not continued to improve with Prozac. Objective:  /80 (Site: Left Upper Arm, Position: Sitting, Cuff Size: Medium Adult)   Pulse 99   Temp 97.2 °F (36.2 °C) (Oral)   Wt (!) 346 lb 12.8 oz (157.3 kg)   LMP 07/16/2020 (Exact Date)   Breastfeeding No   BMI 52.73 kg/m²     Physical Exam  Vitals signs reviewed. Constitutional:       General: She is not in acute distress. Appearance: She is well-developed. HENT:      Head: Normocephalic and atraumatic. Eyes:      Conjunctiva/sclera: Conjunctivae normal.   Cardiovascular:      Rate and Rhythm: Normal rate. Pulmonary:      Effort: Pulmonary effort is normal. No respiratory distress. Skin:     General: Skin is warm and dry. Neurological:      Mental Status: She is alert and oriented to person, place, and time. Psychiatric:         Mood and Affect: Mood normal.         Behavior: Behavior normal.         Thought Content: Thought content normal.        Ultrasound:   PELVIC ULTRASOUND without DOPPLER INTERROGATION   NON OB    DATE: 7/29/20    PHYSICIAN: BRANDT Cortez.     SONOGRAPHER: Carolina Moreno [facility]    INDICATION: menorrhagia    TYPE OF SCAN: vaginal    FINDINGS:    The cul de sac is normal. No free fluid appreciated. The cervix is normal and not enlarged. Nabothian cyst/s is noted within the uterine cervix. The uterus measures 8.02 cm x 4.53 cm x 3.50 cm. The uterus is anteverted. The endometrium measures 8.96 mm and appears heterogeneous. Possible endometrial polyps noted. The myometrium is homogeneous in appearance. No uterine anomalies are noted.      The right ovary is present and normal.    The right ovary measures 3.11 cm x 2.72 cm x 1.90 cm. Ovary findings: No masses seen. The right adnexa is normal.    The left ovary is present and normal.    The left ovary measures 2.08 cm x 2.49 cm x 2.04 cm. Ovary findings: No masses seen. The left adnexa is normal.    IMPRESSION: Normal uterus. Possible endometrial polyps. Normal right ovary. Normal left ovary. Assessment/Plan:     Charmaine Bustillos is a 42 y.o. female who presents for US evaluation for heavy menses. 1. Menorrhagia with regular cycle     - Reviewed etiologies of heavy menses     - CBC, TSH and A1c within normal limits     - TVUS with ES 8.96mm with irregular lining     - Risks, benefits and alternatives of management reviewed. Reviewed future management including EMB today, hysteroscopy D&C, or Saline sonohystogram to delineate irregularity as global thickness vs. Endometrial polyp with EMB vs proceeding with hysteroscopy at that time. - Reviewed likely avoidance of estrogen in management given history of pseudotumor and family history of VTE.      - Patient has sister with anticardiolipin antibodies. - Will follow-up prior to day 10 of next cycle for SIS and poss EMB     2.  Depression     - Reviewed association with menses, regulation of menses may improve     - With start of Prozac 1 month ago and improvement, will continue to monitor for continued improvement    Laurie Enamorado DO

## 2020-08-17 ENCOUNTER — OFFICE VISIT (OUTPATIENT)
Dept: OBGYN CLINIC | Age: 40
End: 2020-08-17
Payer: COMMERCIAL

## 2020-08-17 VITALS
WEIGHT: 293 LBS | SYSTOLIC BLOOD PRESSURE: 128 MMHG | HEART RATE: 83 BPM | BODY MASS INDEX: 53.49 KG/M2 | DIASTOLIC BLOOD PRESSURE: 70 MMHG | TEMPERATURE: 97 F

## 2020-08-17 LAB
CONTROL: NORMAL
PREGNANCY TEST URINE, POC: NEGATIVE

## 2020-08-17 PROCEDURE — 76830 TRANSVAGINAL US NON-OB: CPT | Performed by: OBSTETRICS & GYNECOLOGY

## 2020-08-17 PROCEDURE — 58340 CATHETER FOR HYSTEROGRAPHY: CPT | Performed by: OBSTETRICS & GYNECOLOGY

## 2020-08-17 PROCEDURE — 81025 URINE PREGNANCY TEST: CPT | Performed by: OBSTETRICS & GYNECOLOGY

## 2020-08-17 NOTE — PROGRESS NOTES
Sonohysterogram/Endometrial Biopsy Procedure Note:  [facility]    Indications/Consent  The patient presents for a sonohysterogram secondary to menorrhagia with regular cycle and the appearance of possible endometrial polyps on recent ultrasound. The risks and benefits were discussed with the patient in detail. She is aware the risks include, but are not limited to bleeding, infection and damage to surrounding structures. She acknowledged these risks and does desire to proceed. Written consent was obtained from the patient for saline sonohystogram and possible endometrial biopsy. LMP: Patient's last menstrual period was 08/14/2020. Urine HCG test (today): Negative    Procedure:   Patient was placed in the dorsal lithotomy position in the ultrasound room. A speculum was inserted into the vagina. The cervix was identified and noticed to be non-stenotic. The cervix was then cleansed with betadine swab. The sonohystergram catheter was inserted easily using sterile technique The speculum was then removed with care, ensuring the catheter was not removed inadvertently during this process. 5 mL of sterile saline was injected. The ultrasound images were then obtained and the procedure was then ended. The catheter was removed from the uterus and then discarded. Findings: Bilateral walls of the endometrium were measured and the total endometrial thickness was 5.2 mm, there were no discrete endometrial polyps or filling defects noted on ultrasound. Due to no discrete filling defects will proceed with endometrial biopsy at this time for evaluation of menorrhagia. A tenaculum was placed on the anterior lip of the cervix. The cervix was not dilated. An endometrial pipelle was advanced to the fundus and a sample was gently collected. The uterus sounded to 8 cm. Specimen was adequate after 1 attempt(s). The tenaculum was removed and adequate hemostasis was noted without intervention.

## 2020-08-17 NOTE — LETTER
[facility] OB/GYN SURGERY [address]    Patient Name: Alexis Suazo  Patient : 1980  Patient Sex: female  Patient SSN:   Patient Address: [address] [place] [address] [id number]  Patient Home Phone: [phone] (home)  Cell:   Telephone Information:   Mobile [phone]     Patient Insurance: Payor: Tank Greene / Plan: Alesha Fernandez PPO / Product Type: *No Product type* /      Insurance I.D. #: [id number]  Authorization #: [id number]  PCP: Delano Shane MD     Patient Type:  Outpatient  Anesthesia Type:  general  Surgeon:  Dr. Thi Rangel  Procedure:  Hysteroscopy, D&C, Myosure polypectomy, IUD insertion  CPT Code(s): 20230, 28672, 52162, 98646  PRE OP Diagnosis:  Menorrhagia  ICD-10 Code(s): 92.0    Surgery Date:  2020  Surgery Time:  7:15 AM   OR Time Needed:  1.5 hours  Surgery Location:  [facility]    Allergies: No Known Allergies  Latex Sensitive? no    Who will do History and Physical?  Umang Boggs MD  Cardiac Clearance Needed?  no  Does patient have pacemaker or implanted cardiac defibrillator? no  Special Equipment:  myosure     Name: Viktoriya Mcleod 2020  FAX Number: [phone]      PRE-SURGICAL PHYSICIAN ORDERS    Height:   Ht Readings from Last 1 Encounters:   20 5' 8\" (1.727 m)     Weight:   Wt Readings from Last 1 Encounters:   20 (!) 351 lb 12.8 oz (159.6 kg)             Pre-surgery testing orders:     *Pre-surgical Anesthesia Orders per Anesthesiologist  CBC see surgical orders  *Knee Antithrombic Compression Wraps      Past Medical History:   1. Cold/Chronic Cough/Tuberculosis  No  2. Bronchitis/COPD  No  3. Asthma/SOB  Yes, Exercise induced asthma  4. Rheumatic Fever/Heart Murmur  No  5. High Blood Pressure/Low Blood Pressure  No  6. Swelling of Feet/Fluid in Lungs  No  7. Heart Attack/Chest Pain  No  8. Irregular, Fast Heartbeats  No  9. Do you bruise, bleed easily? No  10. Anemia: Type  No  11.  Diabetes/Low Blood Sugar  No  12. Are you pregnant? No  13. Last Menstrual Period  August 14, 2020  14. Serious Illness During Pregnancy  No  15. Breast Disease  No  16. Kidney Disease  No  17. Jaundice/Hepatitis  No  18. Hiatal Hernia/Peptic Ulcer Disease  No  19. Convulsions/Epilepsy/Stroke  Epilepsy from age [age]. No medication currently no seizure in 10 years  20. Polio/Meningitis Paralysis  No  21. Back Pain/Slipped Disc  No  22. Psychological Disease  Yes, Depression/Anziety  23. Thyroid Disease   No  24. Glaucoma/Visual Impairment   No  25. Skeletal Deformities/Defects/Arthritis  Yes, Athritis in both knees and feet  26. Loose Teeth/Caps on Front Teeth  No  27. Have you had any surgery? Yes, 14 yrs old bone spur on wrist, 26 yrs old knee surgery meniscus  28. Any history of anesthesia complication in self or family? No  29. Prostate Disease  No  30. Cancer  No  31. DVT/PE/PVD  No  32.  Weight Lose/Gain  No    Past Medical History:   Diagnosis Date    Anxiety and depression 12/7/2018    Anxiety and depression     Exercise-induced asthma 5/15/2020    Menopausal symptoms     Neurologic disorder     Patellar tendinitis of left knee     Primary osteoarthritis of left knee 5/2/2019    Seizures ([facility] [address]     last epileptic episode was 12 years ago     Past Surgical History:   Past Surgical History:   Procedure Laterality Date    KNEE CARTILAGE SURGERY      WRIST SURGERY      Bone spur removal     Current Outpatient Medications   Medication Sig Dispense Refill    traZODone (DESYREL) 50 MG tablet Take 50 mg by mouth nightly      diclofenac sodium (VOLTAREN) 1 % GEL Apply 2 g topically 4 times daily 2 Tube 3    DULoxetine (CYMBALTA) 60 MG extended release capsule TAKE 1 CAPSULE BY MOUTH EVERY DAY 90 capsule 0    FLUoxetine (PROZAC) 10 MG capsule Take 2 capsules by mouth daily 90 capsule 1    vitamin D3 (CHOLECALCIFEROL) 25 MCG (1000 UT) TABS tablet Take 2 tablets by mouth daily 180 tablet 1    albuterol sulfate HFA (PROAIR HFA) 108 (90 Base) MCG/ACT inhaler Inhale 2 puffs into the lungs every 6 hours as needed for Wheezing 1 Inhaler 2     No current facility-administered medications for this visit. IN ACCORDANCE WITH OUR FORMULARY SYSTEM, A GENERIC EQUIVALENT DRUG MAY BE DISPENSED AND ADMINISTERED UNLESS D. A. W. IS WRITTEN WITH THE MEDICATION ORDER  PRE-SURGICAL PHYSICIAN ORDERS:  GYNECOLOGY SURGERY    Patient Name __Adela Crenshaw_____    _1980__    Surgical Procedure____Hysteroscopy, D&C, Myosure Polypectomy, IUD insertion___________      Date of Surgery____10/8/2020_______________             ? Admit as Inpatient    x Outpatient    Height___5'8\"_____ Weight___351lb ___Allergies_______none_____________    Pre-surgery Testing Orders:  ? Pre-surgical Anesthesia Orders Per Anesthesiologist ? Type & Screen ? RH ABO ? CBC ? Chem 7   ? Serum HCG quantitative ? Serum HCG qualitative ? CXR _____ Reason ? EKG _____reason                           ? Urine Pregnancy on Day of Surgery for all local anesthesia patients ?  Other:     Preop Antibiotic Prophylaxis/Hysterectomy/Lap assisted Hysterectomy/Vaginal Hysterectomy-Day of Surgery     Cefazolin IVPB per weight base protocol   Cefazolin 2 grams if <119.9kg   Cefazolin 3 grams if ?120kg (?264 lbs.)    ALTERNATIVE:     (Consider for PCN/Cephalosporin allergic pts)                                                                                                                      Metronidazole 500 mg IVPB x 1 and Levofloxacin 500 mg IVPB x1      Metronidazole 500 mg IVPB x 1 and Gentamicin 1.5 mg/kg IVPB x 1                                                                                                                                    Pre-op Antibiotics Prophylaxis: Pubovaginal Sling-Day of Surgery     Cefazolin IVPB per weight base protocol   Cefazolin 2 grams if <119.9kg   Cefazolin 3 grams if ?120kg (?264 lbs.)      Ampicillin/Sulbactam 3g IVPB       ALTERNATIVE:    Levofloxacin 500 mg IVPB     ALTERNATIVE: (Consider for PCN/Cephalosporin allergic pts)        Clindamycin 900 mg IVPB + Gentamicin 1.5 mg/kg IVPB x 1    Metronidazole 500 mg IVPB x1 and Gentamicin 1.5 mg/kg IVPB x1   ADDITIONAL MEDICATIONS:    DVT PREVENTION:       ? Knee Antithrombic compression wraps    Additional Orders: _____________________________________________________________________ ________________________________________________________________________________                Signature _____________________________________________Date _____________________    Please fax at time of booking the case to the Scheduling office at [phone] [facility] [address] at [phone]                                                                                                                                                            You have been scheduled for Hysteroscopy, D&C, Myosure polypectomy at [facility] on October 8, 2020 at 7:15 AM.  Please plan to arrive at 6:00 am .    **Please note all dates and times are subject to change. **    Please contact your primary care provider to schedule a \"pre-op H&P\" visit at least one week prior to surgery at your convenience. You will need to have COVID-19 screening prior to your surgery. See attached list for testing sites and hours. Your testing will need to be completed at least 72 hours before your surgery but no more than 7 days prior. You will need to self isolate until your surgery date once you have had your screening test completed. You have been scheduled with Dr. Juan J Huerta on October 1, 2020 at 9:45 AM to sign surgical consents. Your post operative appointment with Dr. Juan J Huerta has been scheduled for October 15, 2020 at 1:45 PM.    Our office has contacted your insurance company to obtain a pre-certification for your procedure.  It is YOUR responsibility to contact your post-operative recovery period. (If patient does not wish to suspend DNR, complete page two of this form.)    1. I hereby authorize the above named practitioner and associate(s) or assistant(s) to provide such additional services to me as deemed reasonable and necessary, including but not limited to the administration and maintenance of anesthesia; procedures involving pathology and radiology; the administration of blood or blood derivatives; and IV procedural sedation. 2. I have been informed that I need, or I may need during treatment, a transfusion of blood and/or one of its products in the interest of my health and proper medical care. The risks and benefits of receiving transfusion(s) have been described to me. These risks exist despite the fact that the blood has been carefully tested. The alternatives to transfusion, including the risks and consequences of not receiving this therapy, have been explained to me. I understand that although blood and blood products are carefully tested, there is a low risk of hepatitis, HIV transmission (AIDS), fever or allergic reaction or other blood borne disease of adverse reactions; and I agree that no expressed or implied warranty is given by the hospital, any blood bank, or any person or entity as to the blood to blood components so transfused. 3. I recognized that during the course of the operation unforeseen conditions may necessitate additional or different procedures that those set forth above. I therefore further authorize and request that the above named practitioner and associate(s) perform such procedures as are in the practitioners professional judgment necessary and desirable.   4. I consent to the observing, including by , photographing, video/audio, taping or televising of the operations or procedures to be performed including appropriate portions of my body, for medical, scientific, or educational purposes, provided my identity is not revealed by the pictures or by descriptive texts accompanying them. 5. I hereby authorize the entities of [id number] [facility] to preserve for scientific teaching purposes or to otherwise dispose of dismembered tissue, parts or organs resulting from the procedures authorized above. 6. I authorize the use of my social security number to track any medical device implanted during my surgery. I understand that my number may be released to the [address] [place] [address] Way or the  of the implant (if any). 7. A contrast agent may be required for my imaging procedure. I may experience a warm sensation, hives, nausea, vomiting, or a small amount of bleeding under the skin. In rare instances, more serious complications could be encountered which could include shock, kidney failure, and cardiac arrest.  8. This form has been fully explained to me, and I certify that I understand its contents. Patient Signature: _______________________________________________________  Patient Representative: ____________________________Relationship: ____________  Witness: _______________________________________ Date/Time: ______________                                                            [facility]  Notice of Pain Agreement    Date: _________________________________    Name: ___Adela Crenshaw___________________   : ____1980_______    Controlled substance/medication agreements are signed agreements between a patient and a doctor specifying the terms and conditions under which the doctor agrees to treat a patient's chronic pain with controlled medications. You are being scheduled for a surgery where a narcotic medication may be prescribed to control your pain after your procedure. It is required that you share information with our office if you are under contract/agreement with another physician. I attest that I   ? DO    ?  DO NOT   have a pain contract/ narcotic agreement established with another physician. (If under contract, complete the following information.)    [facility] Physician Name: _____________________________________________    Office Name/Address: ___________________________________________________                                         ___________________________________________________    Office Phone: ___________________________ Fax: __________________________    I understand that if I am not forthcoming with information regarding a pain contract currently in place, it may result in a violation of the contract with that provider. The violation may result in cessation of prescribing any controlled medications. Violation may also result in the dismissal of care from the ronaldo provider. It is your responsibility as the patient under contract to understand the contract and adhere to the agreement. Patient Signature: __________________________________  Date: ________________                                                                  CONSENT FOR TREATMENT    PATIENT:___Adela Crenshaw____________________________________________  I hereby authorize Dr. Marie Alfredo and the associate(s) or assistant of his/her choice to perform the following procedures: Hysteroscopy, D&C, Myosure polypectomy, IUD insertion for the purpose of evaluation of myometrium (intrauterine lining) and/or do any other therapeutic procedure that the practitioners judgment may dictate to be advisable for my well being. The above named practitioner has explained the nature of the procedure, the risks, the benefits, and the treatment options have been explained. I have had a chance to ask questions and agree that all questions have been answered to my satisfaction. I acknowledge that no warranty or guarantee has been made to the results of such procedure.   Risks to include but not limited to:  bleeding, infection, pain, need for further procedure, uterine perforation, risk of anesthesia, and risk of failure of procedure. 1. I hereby authorize the above named practitioner and associate(s) or assistant to provide such additional services to me deemed reasonable and necessary, including but not limited to administration and maintenance of anesthesia: procedures involving pathology, radiology and IV sedation. 2. I recognize that during the course of the procedure unforeseen conditions may necessitate additional procedures than those set forth above. I therefore further authorize and request that the above named practitioner and associate(s) perform such procedures as are in the practitioners professional judgment necessary and desirable. 3. I hereby authorize the entities of [employer] to preserve for scientific teaching purposes or to otherwise dispose of tissue/specimen resulting from the procedures authorized above. 4. The form has been fully explained to me, and I certify that I understand its contents.     Patient Name _____________________________________  ________________  Please print  Patients Signature __________________________________ Date _______________  (or person authorized to sign for patient)  Witness _________________________________________ Date ________________

## 2020-08-26 ENCOUNTER — TELEPHONE (OUTPATIENT)
Dept: OBGYN CLINIC | Age: 40
End: 2020-08-26

## 2020-08-26 NOTE — TELEPHONE ENCOUNTER
Routing for PA start:   Spoke with patient regarding pathology results.  Advised that results are suggestive of endometrial polyp that is benign.  Reviewed management options including hysteroscopy D&C with Myosure polypectomy.  Patient is planning for IUD and we can place this in the OR at the time of hysteroscopy.  Reviewed processes with patient.  Please call and start surgery packet and prior authorization process.  Thank you

## 2020-09-16 NOTE — TELEPHONE ENCOUNTER
Patient signed and completed form for the Mirena IUD to be inserted at the time of surgery. Faxed paperwork along with copy of patients insurance card to [facility] today. Patient is scheduled for surgery on 10/8/20.

## 2020-09-21 RX ORDER — DULOXETIN HYDROCHLORIDE 60 MG/1
CAPSULE, DELAYED RELEASE ORAL
Qty: 90 CAPSULE | Refills: 1 | Status: SHIPPED | OUTPATIENT
Start: 2020-09-21 | End: 2021-03-19

## 2020-09-21 NOTE — TELEPHONE ENCOUNTER
Last office visit 7/9/2020     Last written     Next office visit scheduled 10/1/2020    Requested Prescriptions     Pending Prescriptions Disp Refills    DULoxetine (CYMBALTA) 60 MG extended release capsule 90 capsule 0     Sig: Take 1 capsule by mouth every day.

## 2020-09-30 NOTE — PROGRESS NOTES
Brenda Chaparroim    Age 47 y.o.    female    1980    N [id number]    10/8/2020  Arrival Time_____________  OR Time____________[address]     Procedure(s):  VIDEO  HYSTEROSCOPY DILATATION AND CURETTAGE, MYOSURE POLYPECTOMY                      General    Surgeon(s):  Matt Corona, DO       Phone [phone] ([place])     [place] [address]  Cell Work  _________________________________________________________________  _________________________________________________________________  _________________________________________________________________  _________________________________________________________________  _________________________________________________________________      PCP _____________________________ Phone_________________       H&P__________________Bringing    Chart            Epic  DOS     Called_______  EKG__________________Bringing    Chart            Epic  DOS     Called_______  LAB__________________ Bringing    Chart            Epic  DOS     Called_______  Cardiac Clearance_______Bringing    Chart            Epic      DOS       Called_______    Cardiologist________________________ Phone___________________________      ? Hindu concerns / Waiver on Chart            [employer]_____________  ? Pre-op Instructions Given [place]          ______________________________  ? Directions to Surgery Center                          ______________________________  ? Transportation Home_______________      _______________________________  ?  Crutches/Walker__________________        _______________________________      ________Pre-op Orders   _______Transcribed    _______Wt.  ________Pharmacy          _______SCD  ______VTE     ______Beta [facility]ocker  ________Consent             ________TED Najera Campi

## 2020-10-01 ENCOUNTER — OFFICE VISIT (OUTPATIENT)
Dept: FAMILY MEDICINE CLINIC | Age: 40
End: 2020-10-01
Payer: COMMERCIAL

## 2020-10-01 ENCOUNTER — OFFICE VISIT (OUTPATIENT)
Dept: OBGYN CLINIC | Age: 40
End: 2020-10-01

## 2020-10-01 VITALS
DIASTOLIC BLOOD PRESSURE: 62 MMHG | HEART RATE: 62 BPM | WEIGHT: 293 LBS | BODY MASS INDEX: 54.16 KG/M2 | SYSTOLIC BLOOD PRESSURE: 118 MMHG | TEMPERATURE: 97.2 F

## 2020-10-01 VITALS
HEART RATE: 69 BPM | SYSTOLIC BLOOD PRESSURE: 132 MMHG | TEMPERATURE: 97.3 F | DIASTOLIC BLOOD PRESSURE: 84 MMHG | WEIGHT: 293 LBS | HEIGHT: 69 IN | OXYGEN SATURATION: 98 % | BODY MASS INDEX: 43.4 KG/M2

## 2020-10-01 DIAGNOSIS — Z01.818 PRE-OP EXAM: ICD-10-CM

## 2020-10-01 LAB
A/G RATIO: 1.4 (ref 1.1–2.2)
ALBUMIN SERPL-MCNC: 4 G/DL (ref 3.4–5)
ALP BLD-CCNC: 74 U/L (ref 40–129)
ALT SERPL-CCNC: 10 U/L (ref 10–40)
ANION GAP SERPL CALCULATED.3IONS-SCNC: 10 MMOL/L (ref 3–16)
AST SERPL-CCNC: 19 U/L (ref 15–37)
BILIRUB SERPL-MCNC: 0.4 MG/DL (ref 0–1)
BUN BLDV-MCNC: 11 MG/DL (ref 7–20)
CALCIUM SERPL-MCNC: 8.9 MG/DL (ref 8.3–10.6)
CHLORIDE BLD-SCNC: 108 MMOL/L (ref 99–110)
CO2: 27 MMOL/L (ref 21–32)
CREAT SERPL-MCNC: 0.9 MG/DL (ref 0.6–1.1)
GFR AFRICAN AMERICAN: >60
GFR NON-AFRICAN AMERICAN: >60
GLOBULIN: 2.8 G/DL
GLUCOSE BLD-MCNC: 77 MG/DL (ref 70–99)
POTASSIUM SERPL-SCNC: 4.1 MMOL/L (ref 3.5–5.1)
SODIUM BLD-SCNC: 145 MMOL/L (ref 136–145)
TOTAL PROTEIN: 6.8 G/DL (ref 6.4–8.2)

## 2020-10-01 PROCEDURE — 99024 POSTOP FOLLOW-UP VISIT: CPT | Performed by: OBSTETRICS & GYNECOLOGY

## 2020-10-01 PROCEDURE — 99214 OFFICE O/P EST MOD 30 MIN: CPT | Performed by: STUDENT IN AN ORGANIZED HEALTH CARE EDUCATION/TRAINING PROGRAM

## 2020-10-01 RX ORDER — CLONAZEPAM 0.5 MG/1
0.5 TABLET ORAL 2 TIMES DAILY PRN
COMMUNITY
Start: 2020-08-06 | End: 2022-04-21 | Stop reason: ALTCHOICE

## 2020-10-01 RX ORDER — ARIPIPRAZOLE 2 MG/1
2 TABLET ORAL NIGHTLY
COMMUNITY
Start: 2020-09-24 | End: 2021-01-22 | Stop reason: ALTCHOICE

## 2020-10-01 NOTE — PROGRESS NOTES
Preoperative Evaluation    Subjective:   Kashmir Garcia is a 47 y.o. y.o.  female who presents to the office today for a preoperative consultation. Surgeon: Dr. Ben Gutierrez    Location: [facility]  Performing Remove uterine polyp and IUD placement   Date: October 8. Planned anesthesia is General.   The patient has the following known anesthesia issues: none   Patient has a bleeding risk of : no recent abnormal bleeding   Patient does not have objection to receiving blood products if needed. Denies any steroid use in the past 6 mo    Review of Systems   Pertinent items are noted in HPI.      Denies fevers, chills, diaphoresis, CP, SOB, dysphagia, abd pain, urinary complaints, new edema, rashes, cyanosis    Past Medical History:   Diagnosis Date    Anxiety and depression 12/7/2018    Anxiety and depression     Exercise-induced asthma 5/15/2020    Menopausal symptoms     Neurologic disorder     Patellar tendinitis of left knee     Primary osteoarthritis of left knee 5/2/2019    Seizures ([address]     last epileptic episode was 12 years ago       Past Surgical History:   Procedure Laterality Date    KNEE CARTILAGE SURGERY      WRIST SURGERY      Bone spur removal       Social History     Tobacco History     Smoking Status  Never Smoker    Smokeless Tobacco Use  Never Used          Alcohol History     Alcohol Use Status  Yes Drinks/Week  1 Glasses of wine per week Amount  1.0 standard drinks of alcohol/wk Comment  occasional          Drug Use     Drug Use Status  No          Sexual Activity     Sexually Active  Not Currently Partners  Male                Family History   Problem Relation Age of Onset    Diabetes Mother     High Blood Pressure Father     High Blood Pressure Brother     Cancer Maternal Grandmother         ?breast        Current Outpatient Medications   Medication Sig Dispense Refill    clonazePAM (KLONOPIN) 0.5 MG tablet Take 0.5 mg by mouth 2 times daily as needed for Anxiety.  ARIPiprazole (ABILIFY) 2 MG tablet Take 2 mg by mouth nightly      DULoxetine (CYMBALTA) 60 MG extended release capsule Take 1 capsule by mouth every day. 90 capsule 1    traZODone (DESYREL) 50 MG tablet Take 50 mg by mouth nightly      diclofenac sodium (VOLTAREN) 1 % GEL Apply 2 g topically 4 times daily 2 Tube 3    FLUoxetine (PROZAC) 10 MG capsule Take 2 capsules by mouth daily 90 capsule 1    vitamin D3 (CHOLECALCIFEROL) 25 MCG (1000 UT) TABS tablet Take 2 tablets by mouth daily 180 tablet 1    albuterol sulfate HFA (PROAIR HFA) 108 (90 Base) MCG/ACT inhaler Inhale 2 puffs into the lungs every 6 hours as needed for Wheezing (Patient not taking: Reported on 10/1/2020) 1 Inhaler 2     No current facility-administered medications for this visit. Objective:   Vitals:    10/01/20 1412   BP: 132/84   Pulse: 69   Temp: 97.3 °F (36.3 °C)   SpO2: 98%       Physical Exam   /84 (Site: Right Upper Arm, Position: Sitting, Cuff Size: Large Adult)   Pulse 69   Temp 97.3 °F (36.3 °C) (Temporal)   Ht 5' 9.49\" (1.765 m) Comment: without shoes  Wt (!) 356 lb (161.5 kg) Comment: without shoes  LMP 09/28/2020 (Exact Date)   SpO2 98%   BMI 51.84 kg/m²   General appearance: alert, appears stated age, and cooperative  Throat: lips, mucosa, and tongue normal; teeth and gums normal  Lungs: clear to auscultation bilaterally  Heart: regular rate and rhythm, S1, S2 normal, no murmur, click, rub or gallop  Abdomen: soft, non-tender; bowel sounds normal; no masses,  no organomegaly  Extremities: extremities normal, atraumatic, no cyanosis or edema  Pulses: 2+ and symmetric  Skin: Skin color, texture, turgor normal. No rashes or lesions  Neurologic: Grossly normal     Predictors of intubation difficulty:   Morbid obesity? yes - BMI 51.84   Anatomically abnormal facies? no   Short, thick neck?  yes  Neck range of motion: normal   Mallampati score: II (soft palate, uvula, fauces visible)   Dentition: No chipped, loose, or missing teeth. Lab Review   Office Visit on 08/17/2020   Component Date Value    Preg Test, Ur 08/17/2020 negative    Office Visit on 06/26/2020   Component Date Value    TSH 06/26/2020 1.30     Hemoglobin A1C 06/26/2020 5.2     eAG 06/26/2020 102.5     WBC 06/26/2020 9.2     RBC 06/26/2020 4.45     Hemoglobin 06/26/2020 12.7     Hematocrit 06/26/2020 38.5     MCV 06/26/2020 86.5     MCH 06/26/2020 28.7     MCHC 06/26/2020 33.1     RDW 06/26/2020 13.3     Platelets 01/03/5003 250     MPV 06/26/2020 10.1     Neutrophils % 06/26/2020 78.9     Lymphocytes % 06/26/2020 14.5     Monocytes % 06/26/2020 6.2     Eosinophils % 06/26/2020 0.0     Basophils % 06/26/2020 0.4     Neutrophils Absolute 06/26/2020 7.3     Lymphocytes Absolute 06/26/2020 1.3     Monocytes Absolute 06/26/2020 0.6     Eosinophils Absolute 06/26/2020 0.0     Basophils Absolute 06/26/2020 0.0     HPV TYPE 16 06/26/2020 Not Detected     HPV TYPE 18 06/26/2020 Not Detected     HPVOH (OTHER TYPES) 06/26/2020 Not Detected     HPV Comment 06/26/2020 See below         Assessment:   47 y.o. female with planned surgery as above. - Known risk factors for perioperative complications: None   - Difficulty with intubation is not anticipated. - Current medications which may produce withdrawal symptoms if withheld perioperatively:      Plan:   1. Preoperative workup as follows CMP   2. Change in medication regimen before surgery: Hold medications morning of surgery   Pt instructed to avoid NSAIDs, ASA, MV, Vitamin E, Fish oil 1 week prior to surgery to decrease bleeding risk. 3. Prophylaxis for cardiac events with perioperative beta-blockers: not indicated   4. Invasive hemodynamic monitoring perioperatively: not indicated   5. Deep vein thrombosis prophylaxis postoperatively:regimen to be chosen by surgical team   6. Other measures: none      1. Pre-op exam  Discussed risks of surgery with patient.  Overall low risk. No history of cardiac or respiratory disease besides exercise induced asthma. Will check liver and kidney function. Recent cbc in June without abnormality.   - COMPREHENSIVE METABOLIC PANEL; Future          While assessing care for this patient, I have reviewed all pertinent lab work/imaging/ specialist notes and care in reference to those problems addressed above in detail. Appropriate medical decision making was based on this. Please note that portions of this note may have been completed with a voice recognition program. Efforts were made to edit the dictations but occasionally words are mis-transcribed.       Pt is at an acceptable risk for planned procedure    Please call with any questions  Sebastian Humbles, DO

## 2020-10-01 NOTE — PROGRESS NOTES
Return Gyn Office Visit    CC:   Chief Complaint   Patient presents with    Pre-op Exam       HPI:  Sarah Ray is a 47 y.o. female who presents to sign consents for upcoming hysteroscopy D&C with Myosure polypectomy and Mirena IUD insertion. Patient is seen and examined today. Patient is overall doing well. Patient with a history of menorrhagia and dysmenorrhea. Patient is not a candidate for estrogen containing hormones for regulation due to history of pseudotumor cerebri. Patient underwent SIS and endometrial biopsy with endometrial biopsy suggestive of endometrial polyp. Objective:  /62 (Site: Right Upper Arm, Position: Sitting, Cuff Size: Large Adult)   Pulse 62   Temp 97.2 °F (36.2 °C) (Oral)   Wt (!) 356 lb 3.2 oz (161.6 kg)   LMP 09/28/2020   BMI 54.16 kg/m²     Physical Exam  Vitals signs reviewed. Constitutional:       General: She is not in acute distress. Appearance: She is well-developed. HENT:      Head: Normocephalic and atraumatic. Eyes:      Conjunctiva/sclera: Conjunctivae normal.   Cardiovascular:      Rate and Rhythm: Normal rate. Pulmonary:      Effort: Pulmonary effort is normal. No respiratory distress. Musculoskeletal:         General: No swelling. Skin:     General: Skin is warm and dry. Neurological:      Mental Status: She is alert and oriented to person, place, and time. Psychiatric:         Mood and Affect: Mood normal.         Behavior: Behavior normal.         Thought Content: Thought content normal.          Ultrasound:   Impression    PELVIC ULTRASOUND without DOPPLER INTERROGATION    NON OB         DATE: 7/29/20         PHYSICIAN: BRANDT Carreon. Jonathan Rowley         SONOGRAPHER: Aggie Boyd [facility]         INDICATION: menorrhagia         TYPE OF SCAN: vaginal         FINDINGS:      The cul de sac is normal. No free fluid appreciated.         The cervix is normal and not enlarged.     Nabothian cyst/s is noted within the uterine cervix.         The uterus measures 8.02 cm x 4.53 cm x 3.50 cm.      The uterus is anteverted. The endometrium measures 8.96 mm and appears heterogeneous. Possible endometrial polyps noted. The myometrium is homogeneous in appearance. No uterine anomalies are noted.         The right ovary is present and normal.      The right ovary measures 3.11 cm x 2.72 cm x 1.90 cm.      Ovary findings: No masses seen. The right adnexa is normal.         The left ovary is present and normal.      The left ovary measures 2.08 cm x 2.49 cm x 2.04 cm.      Ovary findings: No masses seen. The left adnexa is normal.         IMPRESSION: Normal uterus. Possible endometrial polyps. Normal right ovary. Normal left ovary. Imaging is limited secondary to bowel gas. The patient is well aware of the limitations of ultrasound in the detection of anomalies of the abdomen and pelvis.         Jenna Rios DO        Pathology:   FINAL DIAGNOSIS:     Endometrium, curettage:      - Fragments of proliferative endometrium with ciliated/tubal        metaplasia and focal changes suggestive of benign polyp      - No evidence of hyperplasia, atypia, or malignancy      KIRSE/KIR FINAL DIAGNOSIS:     Assessment/Plan:     Mayelin Salvador is a 47 y.o. female who presents to sign consents for upcoming hysteroscopy D&C with Myosure polypectomy and Mirena IUD insertion. 1. Menorrhagia with regular cycle     - US with thickened endometrial lining and possible endometrial polyp     - SIS performed with no discrete polyps, however on endometrial biopsy findings are suggestive of benign polyp     - Risks, benefits and alternatives were reviewed with the patient. Patient wishes to proceed with hysteroscopy D&C with possible Myosure polypectomy at this time. For further bleeding management patient desires IUD insertion. Has not heard from [employer] to approve shipment to our office at this time.   Contacted while in office to ensure delivery in time for surgical procedure. - Pre-operative instructions reviewed     - Plan for Motrin and Norco post-operatively     - Avoid NSAIDs 7-10 days prior to procedure     - Medications reviewed     - Has H&P scheduled with PCP this week     - Has COVID testing scheduled for 10/2/2020     - Will proceed with surgical procedure as planned    The patient was counseled at length about the risks of ronaldo Covid-19 during their perioperative period and any recovery window from their procedure. The patient was made aware that ronaldo Covid-19  may worsen their prognosis for recovering from their procedure  and lend to a higher morbidity and/or mortality risk. All material risks, benefits, and reasonable alternatives including postponing the procedure were discussed. The patient does wish to proceed with the procedure at this time.       Kell Rivera,

## 2020-10-01 NOTE — PATIENT INSTRUCTIONS
Cigarette smoking is a preventable cause of death in the [place]. If you have thought about quitting but haven't been able to, here are some reasons why you should and some ways to do it. Here's Why   Quitting smoking now can decrease your risk of getting smoking-related illnesses like:   Heart disease   Stroke   Several types of cancer, including:   Lung   Mouth   Esophagus   Larynx   Bladder   Pancreas   Kidney   Chronic lung diseases:   Bronchitis   Emphysema   Asthma   Cataracts   Macular degeneration   Thyroid conditions   Hearing loss   Erectile dysfunction   Dementia   Osteoporosis   Here's How   Once you've decided to quit smoking, set your target quit date a few weeks away. In the time leading up to your quit day, try some of these ideas offered by the [address] to help you successfully quit smoking. For the best results, work with your doctor. Together, you can test your lung function and compare the results to those of a nonsmoking person. The results can be given to you as your lung age. Finding out your lung age right after having the test done may help you to stop smoking. Your doctor can also discuss with you all of your options and refer you to smoking-cessation support groups. You may wish to use nicotine replacement (gum, patches, inhaler) or one of the prescription medications that have been shown to increase quit rates and prolong abstinence from smoking. But whatever you and your doctor decide on these matters, it will still be you who decides when an how to quit. Here are some techniques:   Switch Brands   Switch to a brand you find distasteful. Change to a brand that is low in tar and nicotine a couple of weeks before your target quit date. This will help change your smoking behavior. However, do not smoke more cigarettes, inhale them more often or more deeply, or place your fingertips over the holes in the filters.  All of these actions will increase your nicotine intake, and the idea is to get your body used to functioning without nicotine. Cut Down the Number of Cigarettes You Smoke   Smoke only half of each cigarette. Each day, postpone the lighting of your first cigarette by one hour. Decide you'll only smoke during odd or even hours of the day. Decide beforehand how many cigarettes you'll smoke during the day. For each additional cigarette, give a dollar to your favorite raffi. Change your eating habits to help you cut down. For example, drink milk, which many people consider incompatible with smoking. End meals or snacks with something that won't lead to a cigarette. Reach for a glass of juice instead of a cigarette for a \"pick-me-up. \"   Remember: Cutting down can help you quit, but it's not a substitute for quitting. If you're down to about seven cigarettes a day, it's time to set your target quit date, and get ready to stick to it. Don't Smoke \"Automatically\"   Smoke only those cigarettes you really want. Catch yourself before you light up a cigarette out of pure habit. Don't empty your ashtrays. This will remind you of how many cigarettes you've smoked each day, and the sight and the smell of stale cigarettes butts will be very unpleasant. Make yourself aware of each cigarette by using the opposite hand or putting cigarettes in an unfamiliar location or a different pocket to break the automatic reach. If you light up many times during the day without even thinking about it, try to look in a mirror each time you put a match to your cigarette. You may decide you don't need it. Make Smoking Inconvenient   Stop buying cigarettes by the carton. Wait until one pack is empty before you buy another. Stop carrying cigarettes with you at home or at work. Make them difficult to get to. Make Smoking Unpleasant   Smoke only under circumstances that aren't especially pleasurable for you.  If you like to smoke with others, smoke alone. Turn your chair to an empty corner and focus only on the cigarette you are smoking and all its many negative effects. Collect all your cigarette butts in one large glass container as a visual reminder of the filth made by smoking. Just Before Quitting   Practice going without cigarettes. Don't think of never smoking again. Think of quitting in terms of one day at a time . Tell yourself you won't smoke today, and then don't. Clean your clothes to rid them of the cigarette smell, which can linger a long time. On the Day You Quit   Throw away all your cigarettes and matches. Hide your lighters and ashtrays. Visit the dentist and have your teeth cleaned to get rid of tobacco stains. Notice how nice they look and resolve to keep them that way. Make a list of things you'd like to buy for yourself or someone else. Estimate the cost in terms of packs of cigarettes, and put the money aside to buy these presents. Keep very busy on the big day. Go to the movies, exercise, take long walks, or go bike riding. Remind your family and friends that this is your quit date, and ask them to help you over the rough spots of the first couple of days and weeks. Buy yourself a treat or do something special to celebrate. Telephone and Internet Support   Telephone, web-, and computer-based programs can offer you the support that you need to quit and to stay smoke-free. You can find many programs online, like the American Lung Association's [place] from Smoking . Immediately After Quitting   Develop a clean, fresh, nonsmoking environment around yourselfat work and at home. Buy yourself flowersyou may be surprised how much you can enjoy their scent now. The first few days after you quit, spend as much free time as possible in places where smoking isn't allowed, such as [address] [place] [address] Street, museums, theaters, department stores, and churches.    Drink large quantities of water and fruit juice (but avoid sodas that contain caffeine). Try to avoid alcohol, coffee, and other beverages that you associate with cigarette smoking. Strike up conversation instead of a match for a cigarette. If you miss the sensation of having a cigarette in your hand, play with something elsea pencil, a paper clip, a marble. If you miss having something in your mouth, try toothpicks or a fake cigarette. Here are some useful phone numbers and resources for you to help your smoking cessation. [address] [place] [address]: [phone]  Smoking cessation programs in [facility] [phone]  \"Freshstart\" a 4-session program for smoking cessation - group sessions    [facility] Use Prevention and Control [facility]: 1800-QUIT-NOW     [facility]: 481-265-DTAQ  \"Freshstart' - 4-session program for smoking cessation - group sessions    [facility]: [phone]  Personal Smoking cessation consultations - teens and adults  [facility] By appointment $780    [facility]: [phone]  Offers individual hypnosis, behavior modifications, and counseling in this program. Three sessions over 2-week period  $155 (total cost)    Nicotine Anonymous: [phone]  Open group cessation - everyone welcome     American Cancer Society: 908.575.7832    American Lung Association: 1-800-LUNG-[place]    On-line help:  www.cancer. org  www.quitnet. org  www. whyquit. com  www.stopsmokingsuppport. com  www. ffsonline. org

## 2020-10-02 ENCOUNTER — OFFICE VISIT (OUTPATIENT)
Dept: PRIMARY CARE CLINIC | Age: 40
End: 2020-10-02

## 2020-10-02 PROCEDURE — 99999 PR OFFICE/OUTPT VISIT,PROCEDURE ONLY: CPT | Performed by: NURSE PRACTITIONER

## 2020-10-02 NOTE — PROGRESS NOTES
Hansa Reese received a viral test for COVID-19. They were educated on isolation and quarantine as appropriate. For any symptoms, they were directed to seek care from their PCP, given contact information to establish with a doctor, directed to an urgent care or the emergency room.

## 2020-10-02 NOTE — PATIENT INSTRUCTIONS

## 2020-10-03 LAB — SARS-COV-2, NAA: NOT DETECTED

## 2020-10-05 NOTE — PROGRESS NOTES

## 2020-10-07 ENCOUNTER — ANESTHESIA EVENT (OUTPATIENT)
Dept: OPERATING ROOM | Age: 40
End: 2020-10-07
Payer: COMMERCIAL

## 2020-10-07 NOTE — ANESTHESIA PRE PROCEDURE
Department of Anesthesiology  Preprocedure Note       Name:  Tra Luciano   Age:  47 y.o.  :  1980                                          MRN:  [id number]         Date:  10/7/2020      Surgeon: Yanick Caro):  Wayne Kaur DO    Procedure: Procedure(s):  VIDEO  HYSTEROSCOPY DILATATION AND Carmen Jannie POLYPECTOMY    Medications prior to admission:   Prior to Admission medications    Medication Sig Start Date End Date Taking? Authorizing Provider   clonazePAM (KLONOPIN) 0.5 MG tablet Take 0.5 mg by mouth 2 times daily as needed for Anxiety. 20   Darin Flicker, APRN - CNP   ARIPiprazole (ABILIFY) 2 MG tablet Take 2 mg by mouth nightly 20   Darin Flicker, APRN - CNP   DULoxetine (CYMBALTA) 60 MG extended release capsule Take 1 capsule by mouth every day. 20   Varsha Valdez MD   traZODone (DESYREL) 50 MG tablet Take 50 mg by mouth nightly    Historical Provider, MD   diclofenac sodium (VOLTAREN) 1 % GEL Apply 2 g topically 4 times daily 20   Henry Webster DO   FLUoxetine (PROZAC) 10 MG capsule Take 2 capsules by mouth daily 5/15/20   Varsha Valdez MD   vitamin D3 (CHOLECALCIFEROL) 25 MCG (1000 UT) TABS tablet Take 2 tablets by mouth daily 5/15/20   Varsha Valdez MD   albuterol sulfate HFA (PROAIR HFA) 108 (90 Base) MCG/ACT inhaler Inhale 2 puffs into the lungs every 6 hours as needed for Wheezing  Patient not taking: Reported on 10/1/2020 12/27/19   Stephan Chávez MD       Current medications:    No current facility-administered medications for this encounter. Current Outpatient Medications   Medication Sig Dispense Refill    clonazePAM (KLONOPIN) 0.5 MG tablet Take 0.5 mg by mouth 2 times daily as needed for Anxiety.  ARIPiprazole (ABILIFY) 2 MG tablet Take 2 mg by mouth nightly      DULoxetine (CYMBALTA) 60 MG extended release capsule Take 1 capsule by mouth every day.  90 capsule 1    traZODone (DESYREL) 50 MG tablet Take 50 mg by mouth nightly      diclofenac sodium (VOLTAREN) 1 % GEL Apply 2 g topically 4 times daily 2 Tube 3    FLUoxetine (PROZAC) 10 MG capsule Take 2 capsules by mouth daily 90 capsule 1    vitamin D3 (CHOLECALCIFEROL) 25 MCG (1000 UT) TABS tablet Take 2 tablets by mouth daily 180 tablet 1    albuterol sulfate HFA (PROAIR HFA) 108 (90 Base) MCG/ACT inhaler Inhale 2 puffs into the lungs every 6 hours as needed for Wheezing (Patient not taking: Reported on 10/1/2020) 1 Inhaler 2       Allergies:  No Known Allergies    Problem List:    Patient Active Problem List   Diagnosis Code    Tenosynovitis of right ankle M65.9    Generalized idiopathic epilepsy, not intractable, without status epilepticus ([facility] [address] G40.309    Pseudotumor cerebri syndrome G93.2    Patellar tendinitis of left knee M76.52    Anxiety and depression F41.9, F32.9    Primary osteoarthritis of left knee M17.12    Chondromalacia 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    Exercise-induced asthma J45.990    Vitamin D deficiency E55.9    Elevated lipoprotein(a) 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       Past Medical History:        Diagnosis Date    Anxiety and depression 12/7/2018    Anxiety and depression     Exercise-induced asthma 5/15/2020    Menopausal symptoms     Neurologic disorder     Patellar tendinitis of left knee     Primary osteoarthritis of left knee 5/2/2019    Seizures ([facility] [address]     last epileptic episode was 12 years ago       Past Surgical History:        Procedure Laterality Date    KNEE CARTILAGE SURGERY      WRIST SURGERY      Bone spur removal       Social History:    Social History     Tobacco Use    Smoking status: Never Smoker    Smokeless tobacco: Never Used   Substance Use Topics    Alcohol use:  Yes     Alcohol/week: 1.0 standard drinks     Types: 1 Glasses of wine per week     Comment: occasional                                Counseling given: Not Answered      Vital Signs (Current):   Vitals:    10/05/20 0942   Weight: (!) 356 lb (161.5 kg)   Height: 5' 9.5\" (1.765 m) BP Readings from Last 3 Encounters:   10/01/20 132/84   10/01/20 118/62   08/17/20 128/70       NPO Status:                                                                                 BMI:   Wt Readings from Last 3 Encounters:   10/01/20 (!) 356 lb (161.5 kg)   10/01/20 (!) 356 lb 3.2 oz (161.6 kg)   08/17/20 (!) 351 lb 12.8 oz (159.6 kg)     Body mass index is 51.82 kg/m². CBC:   Lab Results   Component Value Date    WBC 9.2 06/26/2020    RBC 4.45 06/26/2020    HGB 12.7 06/26/2020    HCT 38.5 06/26/2020    MCV 86.5 06/26/2020    RDW 13.3 06/26/2020     06/26/2020       CMP:   Lab Results   Component Value Date     10/01/2020    K 4.1 10/01/2020     10/01/2020    CO2 27 10/01/2020    BUN 11 10/01/2020    CREATININE 0.9 10/01/2020    GFRAA >60 10/01/2020    AGRATIO 1.4 10/01/2020    LABGLOM >60 10/01/2020    GLUCOSE 77 10/01/2020    PROT 6.8 10/01/2020    CALCIUM 8.9 10/01/2020    BILITOT 0.4 10/01/2020    ALKPHOS 74 10/01/2020    AST 19 10/01/2020    ALT 10 10/01/2020       POC Tests: No results for input(s): POCGLU, POCNA, POCK, POCCL, POCBUN, POCHEMO, POCHCT in the last 72 hours.     Coags: No results found for: PROTIME, INR, APTT    HCG (If Applicable):   Lab Results   Component Value Date    PREGTESTUR negative 08/17/2020        ABGs: No results found for: PHART, PO2ART, ZIU7QEC, OGD9FGV, BEART, M6XGIPEW     Type & Screen (If Applicable):  No results found for: LABABO, LABRH    Drug/Infectious Status (If Applicable):  No results found for: HIV, HEPCAB    COVID-19 Screening (If Applicable):   Lab Results   Component Value Date    COVID19 NOT DETECTED 10/02/2020         Anesthesia Evaluation  Patient summary reviewed and Nursing notes reviewed no history of anesthetic complications:   Airway: Mallampati: III     Neck ROM: full   Dental:          Pulmonary:Negative Pulmonary ROS and normal exam    (+) asthma: Cardiovascular:Negative CV ROS                      Neuro/Psych:   Negative Neuro/Psych ROS  (+) seizures: well controlled, psychiatric history:depression/anxiety             GI/Hepatic/Renal: Neg GI/Hepatic/Renal ROS       (-) hiatal hernia and GERD       Endo/Other: Negative Endo/Other ROS   (+) : arthritis:., .                 Abdominal:           Vascular:                                      Anesthesia Plan      general     ASA 3     (I discussed with the patient the risks and benefits of PIV, general anesthesia, IV Narcotics, PACU. All questions were answered the patient agrees with the plan and wishes to proceed.  )  Induction: intravenous. Pre-Operative Diagnosis: Menorrhagia with regular cycle [N92.0]    47 y.o.   BMI:  Body mass index is 51.82 kg/m². Vitals:    10/05/20 0942 10/08/20 0632   BP:  117/82   Pulse:  75   Resp:  16   Temp:  98 °F (36.7 °C)   TempSrc:  Oral   SpO2:  97%   Weight: (!) 356 lb (161.5 kg)    Height: 5' 9.5\" (1.765 m)        No Known Allergies    Social History     Tobacco Use    Smoking status: Never Smoker    Smokeless tobacco: Never Used   Substance Use Topics    Alcohol use:  Yes     Alcohol/week: 1.0 standard drinks     Types: 1 Glasses of wine per week     Comment: occasional       LABS:    CBC  Lab Results   Component Value Date/Time    WBC 6.5 10/08/2020 06:45 AM    HGB 12.1 10/08/2020 06:45 AM    HCT 36.7 10/08/2020 06:45 AM     10/08/2020 06:45 AM     RENAL  Lab Results   Component Value Date/Time     10/01/2020 02:59 PM    K 4.1 10/01/2020 02:59 PM     10/01/2020 02:59 PM    CO2 27 10/01/2020 02:59 PM    BUN 11 10/01/2020 02:59 PM    CREATININE 0.9 10/01/2020 02:59 PM    GLUCOSE 77 10/01/2020 02:59 PM     COAGS  No results found for: PROTIME, INR, APTT      Alysha Ruvalcaba MD   10/7/2020

## 2020-10-08 ENCOUNTER — ANESTHESIA (OUTPATIENT)
Dept: OPERATING ROOM | Age: 40
End: 2020-10-08
Payer: COMMERCIAL

## 2020-10-08 ENCOUNTER — HOSPITAL ENCOUNTER (OUTPATIENT)
Age: 40
Setting detail: OUTPATIENT SURGERY
Discharge: HOME | End: 2020-10-08
Attending: OBSTETRICS & GYNECOLOGY | Admitting: OBSTETRICS & GYNECOLOGY
Payer: COMMERCIAL

## 2020-10-08 VITALS
OXYGEN SATURATION: 100 % | HEART RATE: 75 BPM | DIASTOLIC BLOOD PRESSURE: 60 MMHG | SYSTOLIC BLOOD PRESSURE: 128 MMHG | BODY MASS INDEX: 41.95 KG/M2 | RESPIRATION RATE: 16 BRPM | HEIGHT: 70 IN | TEMPERATURE: 97 F | WEIGHT: 293 LBS

## 2020-10-08 VITALS
SYSTOLIC BLOOD PRESSURE: 119 MMHG | DIASTOLIC BLOOD PRESSURE: 76 MMHG | TEMPERATURE: 98.6 F | OXYGEN SATURATION: 100 % | RESPIRATION RATE: 12 BRPM

## 2020-10-08 LAB
ANION GAP SERPL CALCULATED.3IONS-SCNC: 9 MMOL/L (ref 3–16)
BUN BLDV-MCNC: 11 MG/DL (ref 7–20)
CALCIUM SERPL-MCNC: 8.9 MG/DL (ref 8.3–10.6)
CHLORIDE BLD-SCNC: 106 MMOL/L (ref 99–110)
CO2: 26 MMOL/L (ref 21–32)
CREAT SERPL-MCNC: 0.8 MG/DL (ref 0.6–1.1)
GFR AFRICAN AMERICAN: >60
GFR NON-AFRICAN AMERICAN: >60
GLUCOSE BLD-MCNC: 96 MG/DL (ref 70–99)
HCT VFR BLD CALC: 36.7 % (ref 36–48)
HEMOGLOBIN: 12.1 G/DL (ref 12–16)
MCH RBC QN AUTO: 28.4 PG (ref 26–34)
MCHC RBC AUTO-ENTMCNC: 33 G/DL (ref 31–36)
MCV RBC AUTO: 86 FL (ref 80–100)
PDW BLD-RTO: 13.6 % (ref 12.4–15.4)
PLATELET # BLD: 243 K/UL (ref 135–450)
PMV BLD AUTO: 9 FL (ref 5–10.5)
POTASSIUM SERPL-SCNC: 3.8 MMOL/L (ref 3.5–5.1)
PREGNANCY, URINE: NEGATIVE
RBC # BLD: 4.27 M/UL (ref 4–5.2)
SODIUM BLD-SCNC: 141 MMOL/L (ref 136–145)
WBC # BLD: 6.5 K/UL (ref 4–11)

## 2020-10-08 PROCEDURE — 58300 INSERT INTRAUTERINE DEVICE: CPT | Performed by: OBSTETRICS & GYNECOLOGY

## 2020-10-08 PROCEDURE — 80048 BASIC METABOLIC PNL TOTAL CA: CPT

## 2020-10-08 PROCEDURE — 84703 CHORIONIC GONADOTROPIN ASSAY: CPT

## 2020-10-08 PROCEDURE — 88305 TISSUE EXAM BY PATHOLOGIST: CPT

## 2020-10-08 PROCEDURE — 6360000002 HC RX W HCPCS: Performed by: NURSE ANESTHETIST, CERTIFIED REGISTERED

## 2020-10-08 PROCEDURE — 6370000000 HC RX 637 (ALT 250 FOR IP): Performed by: ANESTHESIOLOGY

## 2020-10-08 PROCEDURE — 3700000001 HC ADD 15 MINUTES (ANESTHESIA): Performed by: OBSTETRICS & GYNECOLOGY

## 2020-10-08 PROCEDURE — 2500000003 HC RX 250 WO HCPCS: Performed by: NURSE ANESTHETIST, CERTIFIED REGISTERED

## 2020-10-08 PROCEDURE — 58558 HYSTEROSCOPY BIOPSY: CPT | Performed by: OBSTETRICS & GYNECOLOGY

## 2020-10-08 PROCEDURE — 6370000000 HC RX 637 (ALT 250 FOR IP): Performed by: OBSTETRICS & GYNECOLOGY

## 2020-10-08 PROCEDURE — 3700000000 HC ANESTHESIA ATTENDED CARE: Performed by: OBSTETRICS & GYNECOLOGY

## 2020-10-08 PROCEDURE — 7100000001 HC PACU RECOVERY - ADDTL 15 MIN: Performed by: OBSTETRICS & GYNECOLOGY

## 2020-10-08 PROCEDURE — 2580000003 HC RX 258: Performed by: OBSTETRICS & GYNECOLOGY

## 2020-10-08 PROCEDURE — 2720000010 HC SURG SUPPLY STERILE: Performed by: OBSTETRICS & GYNECOLOGY

## 2020-10-08 PROCEDURE — 85027 COMPLETE CBC AUTOMATED: CPT

## 2020-10-08 PROCEDURE — 7100000000 HC PACU RECOVERY - FIRST 15 MIN: Performed by: OBSTETRICS & GYNECOLOGY

## 2020-10-08 PROCEDURE — 7100000011 HC PHASE II RECOVERY - ADDTL 15 MIN: Performed by: OBSTETRICS & GYNECOLOGY

## 2020-10-08 PROCEDURE — 2580000003 HC RX 258: Performed by: ANESTHESIOLOGY

## 2020-10-08 PROCEDURE — 7100000010 HC PHASE II RECOVERY - FIRST 15 MIN: Performed by: OBSTETRICS & GYNECOLOGY

## 2020-10-08 PROCEDURE — 3600000012 HC SURGERY LEVEL 2 ADDTL 15MIN: Performed by: OBSTETRICS & GYNECOLOGY

## 2020-10-08 PROCEDURE — 3600000002 HC SURGERY LEVEL 2 BASE: Performed by: OBSTETRICS & GYNECOLOGY

## 2020-10-08 PROCEDURE — 2709999900 HC NON-CHARGEABLE SUPPLY: Performed by: OBSTETRICS & GYNECOLOGY

## 2020-10-08 RX ORDER — LABETALOL HYDROCHLORIDE 5 MG/ML
5 INJECTION, SOLUTION INTRAVENOUS EVERY 10 MIN PRN
Status: DISCONTINUED | OUTPATIENT
Start: 2020-10-08 | End: 2020-10-08 | Stop reason: HOSPADM

## 2020-10-08 RX ORDER — ROCURONIUM BROMIDE 10 MG/ML
INJECTION, SOLUTION INTRAVENOUS PRN
Status: DISCONTINUED | OUTPATIENT
Start: 2020-10-08 | End: 2020-10-08 | Stop reason: SDUPTHER

## 2020-10-08 RX ORDER — IBUPROFEN 800 MG/1
800 TABLET ORAL EVERY 8 HOURS PRN
Qty: 30 TABLET | Refills: 0 | Status: SHIPPED | OUTPATIENT
Start: 2020-10-08 | End: 2021-01-22 | Stop reason: ALTCHOICE

## 2020-10-08 RX ORDER — OXYCODONE HYDROCHLORIDE AND ACETAMINOPHEN 5; 325 MG/1; MG/1
1 TABLET ORAL PRN
Status: COMPLETED | OUTPATIENT
Start: 2020-10-08 | End: 2020-10-08

## 2020-10-08 RX ORDER — DIPHENHYDRAMINE HYDROCHLORIDE 50 MG/ML
12.5 INJECTION INTRAMUSCULAR; INTRAVENOUS
Status: DISCONTINUED | OUTPATIENT
Start: 2020-10-08 | End: 2020-10-08 | Stop reason: HOSPADM

## 2020-10-08 RX ORDER — ONDANSETRON 2 MG/ML
INJECTION INTRAMUSCULAR; INTRAVENOUS PRN
Status: DISCONTINUED | OUTPATIENT
Start: 2020-10-08 | End: 2020-10-08 | Stop reason: SDUPTHER

## 2020-10-08 RX ORDER — PROPOFOL 10 MG/ML
INJECTION, EMULSION INTRAVENOUS PRN
Status: DISCONTINUED | OUTPATIENT
Start: 2020-10-08 | End: 2020-10-08 | Stop reason: SDUPTHER

## 2020-10-08 RX ORDER — MEPERIDINE HYDROCHLORIDE 50 MG/ML
12.5 INJECTION INTRAMUSCULAR; INTRAVENOUS; SUBCUTANEOUS EVERY 5 MIN PRN
Status: DISCONTINUED | OUTPATIENT
Start: 2020-10-08 | End: 2020-10-08 | Stop reason: HOSPADM

## 2020-10-08 RX ORDER — ONDANSETRON 2 MG/ML
4 INJECTION INTRAMUSCULAR; INTRAVENOUS PRN
Status: DISCONTINUED | OUTPATIENT
Start: 2020-10-08 | End: 2020-10-08 | Stop reason: HOSPADM

## 2020-10-08 RX ORDER — HYDROCODONE BITARTRATE AND ACETAMINOPHEN 5; 325 MG/1; MG/1
1 TABLET ORAL EVERY 6 HOURS PRN
Qty: 20 TABLET | Refills: 0 | Status: SHIPPED | OUTPATIENT
Start: 2020-10-08 | End: 2020-10-13

## 2020-10-08 RX ORDER — HYDRALAZINE HYDROCHLORIDE 20 MG/ML
5 INJECTION INTRAMUSCULAR; INTRAVENOUS EVERY 10 MIN PRN
Status: DISCONTINUED | OUTPATIENT
Start: 2020-10-08 | End: 2020-10-08 | Stop reason: HOSPADM

## 2020-10-08 RX ORDER — MAGNESIUM HYDROXIDE 1200 MG/15ML
LIQUID ORAL CONTINUOUS PRN
Status: COMPLETED | OUTPATIENT
Start: 2020-10-08 | End: 2020-10-08

## 2020-10-08 RX ORDER — LIDOCAINE HYDROCHLORIDE 20 MG/ML
INJECTION, SOLUTION INFILTRATION; PERINEURAL PRN
Status: DISCONTINUED | OUTPATIENT
Start: 2020-10-08 | End: 2020-10-08 | Stop reason: SDUPTHER

## 2020-10-08 RX ORDER — SODIUM CHLORIDE, SODIUM LACTATE, POTASSIUM CHLORIDE, AND CALCIUM CHLORIDE .6; .31; .03; .02 G/100ML; G/100ML; G/100ML; G/100ML
IRRIGANT IRRIGATION PRN
Status: DISCONTINUED | OUTPATIENT
Start: 2020-10-08 | End: 2020-10-08 | Stop reason: ALTCHOICE

## 2020-10-08 RX ORDER — DEXAMETHASONE SODIUM PHOSPHATE 10 MG/ML
INJECTION INTRAMUSCULAR; INTRAVENOUS PRN
Status: DISCONTINUED | OUTPATIENT
Start: 2020-10-08 | End: 2020-10-08 | Stop reason: SDUPTHER

## 2020-10-08 RX ORDER — KETOROLAC TROMETHAMINE 30 MG/ML
INJECTION, SOLUTION INTRAMUSCULAR; INTRAVENOUS PRN
Status: DISCONTINUED | OUTPATIENT
Start: 2020-10-08 | End: 2020-10-08 | Stop reason: SDUPTHER

## 2020-10-08 RX ORDER — OXYCODONE HYDROCHLORIDE AND ACETAMINOPHEN 5; 325 MG/1; MG/1
2 TABLET ORAL PRN
Status: COMPLETED | OUTPATIENT
Start: 2020-10-08 | End: 2020-10-08

## 2020-10-08 RX ORDER — MORPHINE SULFATE 2 MG/ML
1 INJECTION, SOLUTION INTRAMUSCULAR; INTRAVENOUS EVERY 5 MIN PRN
Status: DISCONTINUED | OUTPATIENT
Start: 2020-10-08 | End: 2020-10-08 | Stop reason: HOSPADM

## 2020-10-08 RX ORDER — PROMETHAZINE HYDROCHLORIDE 25 MG/ML
6.25 INJECTION, SOLUTION INTRAMUSCULAR; INTRAVENOUS
Status: DISCONTINUED | OUTPATIENT
Start: 2020-10-08 | End: 2020-10-08 | Stop reason: HOSPADM

## 2020-10-08 RX ORDER — MORPHINE SULFATE 2 MG/ML
2 INJECTION, SOLUTION INTRAMUSCULAR; INTRAVENOUS EVERY 5 MIN PRN
Status: DISCONTINUED | OUTPATIENT
Start: 2020-10-08 | End: 2020-10-08 | Stop reason: HOSPADM

## 2020-10-08 RX ORDER — MIDAZOLAM HYDROCHLORIDE 1 MG/ML
INJECTION INTRAMUSCULAR; INTRAVENOUS PRN
Status: DISCONTINUED | OUTPATIENT
Start: 2020-10-08 | End: 2020-10-08 | Stop reason: SDUPTHER

## 2020-10-08 RX ORDER — SODIUM CHLORIDE, SODIUM LACTATE, POTASSIUM CHLORIDE, CALCIUM CHLORIDE 600; 310; 30; 20 MG/100ML; MG/100ML; MG/100ML; MG/100ML
INJECTION, SOLUTION INTRAVENOUS CONTINUOUS
Status: DISCONTINUED | OUTPATIENT
Start: 2020-10-08 | End: 2020-10-08 | Stop reason: HOSPADM

## 2020-10-08 RX ORDER — FENTANYL CITRATE 50 UG/ML
INJECTION, SOLUTION INTRAMUSCULAR; INTRAVENOUS PRN
Status: DISCONTINUED | OUTPATIENT
Start: 2020-10-08 | End: 2020-10-08 | Stop reason: SDUPTHER

## 2020-10-08 RX ADMIN — LIDOCAINE HYDROCHLORIDE 3 ML: 20 INJECTION, SOLUTION INFILTRATION; PERINEURAL at 07:14

## 2020-10-08 RX ADMIN — MIDAZOLAM HYDROCHLORIDE 2 MG: 2 INJECTION, SOLUTION INTRAMUSCULAR; INTRAVENOUS at 07:09

## 2020-10-08 RX ADMIN — KETOROLAC TROMETHAMINE 30 MG: 30 INJECTION, SOLUTION INTRAMUSCULAR; INTRAVENOUS at 07:40

## 2020-10-08 RX ADMIN — SODIUM CHLORIDE, POTASSIUM CHLORIDE, SODIUM LACTATE AND CALCIUM CHLORIDE: 600; 310; 30; 20 INJECTION, SOLUTION INTRAVENOUS at 05:41

## 2020-10-08 RX ADMIN — SODIUM CHLORIDE, POTASSIUM CHLORIDE, SODIUM LACTATE AND CALCIUM CHLORIDE: 600; 310; 30; 20 INJECTION, SOLUTION INTRAVENOUS at 06:52

## 2020-10-08 RX ADMIN — DEXAMETHASONE SODIUM PHOSPHATE 10 MG: 10 INJECTION INTRAMUSCULAR; INTRAVENOUS at 07:23

## 2020-10-08 RX ADMIN — ROCURONIUM BROMIDE 15 MG: 10 SOLUTION INTRAVENOUS at 07:14

## 2020-10-08 RX ADMIN — PROPOFOL 250 MG: 10 INJECTION, EMULSION INTRAVENOUS at 07:14

## 2020-10-08 RX ADMIN — ONDANSETRON 4 MG: 2 INJECTION INTRAMUSCULAR; INTRAVENOUS at 07:23

## 2020-10-08 RX ADMIN — FENTANYL CITRATE 50 MCG: 50 INJECTION, SOLUTION INTRAMUSCULAR; INTRAVENOUS at 07:11

## 2020-10-08 RX ADMIN — OXYCODONE HYDROCHLORIDE AND ACETAMINOPHEN 1 TABLET: 5; 325 TABLET ORAL at 08:15

## 2020-10-08 RX ADMIN — FENTANYL CITRATE 100 MCG: 50 INJECTION, SOLUTION INTRAMUSCULAR; INTRAVENOUS at 07:14

## 2020-10-08 ASSESSMENT — PULMONARY FUNCTION TESTS
PIF_VALUE: 24
PIF_VALUE: 17
PIF_VALUE: 21
PIF_VALUE: 24
PIF_VALUE: 21
PIF_VALUE: 0
PIF_VALUE: 18
PIF_VALUE: 26
PIF_VALUE: 17
PIF_VALUE: 26
PIF_VALUE: 5
PIF_VALUE: 0
PIF_VALUE: 19
PIF_VALUE: 23
PIF_VALUE: 26
PIF_VALUE: 26
PIF_VALUE: 11
PIF_VALUE: 26
PIF_VALUE: 26
PIF_VALUE: 25
PIF_VALUE: 0
PIF_VALUE: 2
PIF_VALUE: 26
PIF_VALUE: 23
PIF_VALUE: 4
PIF_VALUE: 13
PIF_VALUE: 2
PIF_VALUE: 24
PIF_VALUE: 26
PIF_VALUE: 26
PIF_VALUE: 25
PIF_VALUE: 10
PIF_VALUE: 20
PIF_VALUE: 7
PIF_VALUE: 26
PIF_VALUE: 0
PIF_VALUE: 25
PIF_VALUE: 13

## 2020-10-08 ASSESSMENT — PAIN SCALES - GENERAL
PAINLEVEL_OUTOF10: 6
PAINLEVEL_OUTOF10: 5

## 2020-10-08 ASSESSMENT — PAIN - FUNCTIONAL ASSESSMENT: PAIN_FUNCTIONAL_ASSESSMENT: 0-10

## 2020-10-08 NOTE — H&P
[facility]  History and Physical Attestation    Patient seen and evaluated prior to surgery. Patient reports no changes in medical condition. There have been no changes in the patient's medications or assessment. Preoperative tests and diagnostics have been reviewed. Surgery remains indicated as noted in History and Physical (H&P). Prophylactic antibiotics, use of beta-blockers and VTE prophylaxis have been addressed/ordered as indicated. Please see H&P and orders. History and Physical performed by patient's PCP Roxane Mon DO on 10/1/2020 with \"patient is at an acceptable risk for planned procedure. \"       All questions were answered to the patient's satisfaction. Consents are signed and on chart. Will proceed with procedure as planned.       Alphonse Spring DO

## 2020-10-08 NOTE — ANESTHESIA POSTPROCEDURE EVALUATION
Department of Anesthesiology  Postprocedure Note    Patient: Padmini Akhtar  MRN: [id number]  [place]: 1980  Date of evaluation: 10/8/2020  Time:  2:11 PM     Procedure Summary     Date:  10/08/20 Room / Location:  [facility] OR [address] [place] [address]    Anesthesia Start:  3942 Anesthesia Stop:  Latrell Garcia    Procedure:  VIDEO  HYSTEROSCOPY DILATATION AND CURETTAGE, MYOSURE POLYPECTOMY, INSERTION OF MIRENA (N/A Vagina ) Diagnosis:       Menorrhagia with regular cycle      (Menorrhagia)    Surgeon:  Kell Rivera DO Responsible Provider:  Nathaly Sutton MD    Anesthesia Type:  general ASA Status:  3          Anesthesia Type: general    Cyndie Phase I: Cyndie Score: 10    Cyndie Phase II: Cyndie Score: 10    Last vitals: Reviewed and per EMR flowsheets.        Anesthesia Post Evaluation    Comments: Postoperative Anesthesia Note    Name:    Padmini Akhtar  MRN:      [id number]    Patient Vitals in the past 12 hrs:  10/08/20 0830, BP:128/60, Pulse:75, Resp:16, SpO2:100 %  10/08/20 0815, BP:121/67, Pulse:76, Resp:16, SpO2:99 %  10/08/20 0810, BP:124/62, Temp:97 °F (36.1 °C), Pulse:82, Resp:12, SpO2:100 %  10/08/20 0805, BP:107/66, Pulse:82, Resp:9, SpO2:97 %  10/08/20 0800, BP:123/63, Pulse:77, Resp:18, SpO2:100 %  10/08/20 0755, BP:124/60, Pulse:88, Resp:17, SpO2:99 %  10/08/20 0752, BP:119/60, Temp:96.6 °F (35.9 °C), Temp src:Temporal, Pulse:83, Resp:12, SpO2:99 %  10/08/20 0632, BP:117/82, Temp:98 °F (36.7 °C), Temp src:Oral, Pulse:75, Resp:16, SpO2:97 %     LABS:    CBC  Lab Results       Component                Value               Date/Time                  WBC                      6.5                 10/08/2020 06:45 AM        HGB                      12.1                10/08/2020 06:45 AM        HCT                      36.7                10/08/2020 06:45 AM        PLT                      243                 10/08/2020 06:45 AM   RENAL  Lab Results       Component                Value

## 2020-10-08 NOTE — OP NOTE
Department of Gynecology  Brief Operative Report        Pre-operative Diagnosis:   1. Menorrhagia with regular cycle  2. Endometrial polyp    Post-operative Diagnosis:    1. Menorrhagia with regular cycle  2. Endometrial polyp    Procedure:    1. Hysteroscopy dilation and curettage with Myosure REACH polypectomy  2. Mirena IUD insertion    Surgeon:  BRANDT Francisco DO      Assistant(s):  See operative record    Anesthesia:  General Laryngeal Mask Airway Anesthesia    Findings:    1. Anteverted uterus sounding to 8cm  2. Multiple polypoid structures within the endometrial cavity  3. Normal appearing bilateral tubal ostia  4. Normal appearing cervix and external genitalia    Total IV fluids:  900 ml    Urine Output:  25 ml    Estimated blood loss:  less than 50ml    Blood Transfusion?:  No     Drains:  none    Specimens:  Endometrial curettings    Complications:  none    Condition:  Stable to PACU    See dictated operative report for full details.       Dictation ID#: [id number]

## 2020-10-08 NOTE — OP NOTE
[address] [address]                                OPERATIVE REPORT    PATIENT NAME: Nat Reyes                     :        1980  MED REC NO:   [id number]                          ROOM:  ACCOUNT NO:   [de-identified]                           ADMIT DATE: 10/08/2020  PROVIDER:     Alex Orellana DO    DATE OF PROCEDURE:  10/08/2020    PREOPERATIVE DIAGNOSES:  1. Menorrhagia with regular cycle. 2.  Endometrial polyp. POSTOPERATIVE DIAGNOSES:  1. Menorrhagia with regular cycle. 2.  Endometrial polyp. OPERATION PERFORMED:  1. Hysteroscopy, dilation and curettage with MyoSure REACH polypectomy. 2.  Mirena IUD insertion. SURGEON:  Krista Yadav DO    ASSISTANT:  See operative record. ANESTHESIA:  General LMA. TOTAL IV FLUIDS:  900 mL. URINE OUTPUT:  25 mL. ESTIMATED BLOOD LOSS:  Less than 50 mL. BLOOD TRANSFUSIONS:  No.    DRAINS:  None. SPECIMENS:  Endometrial curettings. COMPLICATIONS:  None. CONDITION:  Stable to PACU. FINDINGS:  1. Anteverted uterus sounded to 8 cm. 2.  Multiple polypoid structures within the endometrial cavity. 3.  Normal appearing bilateral tubal ostia. 4.  Normal appearing cervix and external genitalia. INDICATIONS AND CONSENT:  The patient is a 37-year-old female who  presents with history of heavy menses. Ultrasound was performed which  showed a suspected polyp within the endometrium. Saline sonohysterogram  was performed which was inconclusive. An endometrial biopsy was  performed. Endometrial biopsy was positive for fragments of  proliferative endometrium with tubal metaplasia and focal changes  suggestive of endometrial polyp. Risks, benefits, and alternatives were  reviewed with the patient regarding further diagnoses and management.    Risks included, but were not limited to infection, bleeding, injury to   the uterus and surrounding pelvic structures, risks of anesthesia, and   even death. All questions were answered to the patient's satisfaction. The patient wishes to proceed with hysteroscopy, dilation and curettage  and Mirena IUD insertion. Consents were signed and in the  chart. OPERATIVE PROCEDURE:  The patient was taken to the operating room and  placed on the operating room table where general anesthesia was obtained  without difficulty. The patient was placed in the dorsal lithotomy  position using Yellofin stirrups. The patient was prepped and draped  in the usual sterile fashion. A universal time-out was performed prior  to start of the procedure. A weighted speculum was placed in the posterior aspect of the patient's  vagina and the anterior lip of the cervix was grasped using a  single-tooth tenaculum. The uterus was gently sounded to 8cm. The cervix was sequentially dilated using Babb  dilators to accommodate the MyoSure hysteroscope. The MyoSure  hysteroscope was prepped and primed. The Myosure was inserted through the cervix  to the level of the fundus with the findings as noted above. Multiple  polypoid structures were noted, one of which was within the right  cornua. The MyoSure REACH was utilized to remove polypoid structures  and obtain a global specimen. Following curettage, adequate sampling was  noted with low concern for uterine perforation. MyoSure total fluid  input was 1465 mL. The fluid deficit was 335 mL. MyoSure hysteroscope  was withdrawn. At this time, IUD was then placed per manufacture's instructions without  Difficulty. The strings were trimmed to 3 cm. The tenaculum was removed   from the anterior aspect of the cervix. The tenaculum sites were noted to be  oozy and were treated with silver nitrate with excellent hemostasis to  follow. Minimal bleeding was noted from the cervical os. The patient  tolerated the procedure well.   All sponge, lap, and needle counts were  noted to be correct. The patient was awoken from general anesthesia  without difficulty and taken to the postanesthesia care unit in stable  condition.         [address] [place] [address]    D: 10/08/2020 7:52:54       T: 10/08/2020 10:54:51     [email or web address]  Job#: [id number]     Doc#: [id number]    CC:

## 2020-10-08 NOTE — DISCHARGE INSTR - DIET
Good nutrition is important when healing from an illness, injury, or surgery. Follow any nutrition recommendations given to you during your hospital stay. If you were given an oral nutrition supplement while in the hospital, continue to take this supplement at home. You can take it with meals, in-between meals, and/or before bedtime. These supplements can be purchased at most local grocery stores, pharmacies, and chain [employer]-stores. If you have any questions about your diet or nutrition, call the hospital and ask for the dietitian.     General adult diet

## 2020-10-14 ENCOUNTER — TELEPHONE (OUTPATIENT)
Dept: OBGYN CLINIC | Age: 40
End: 2020-10-14

## 2020-10-15 ENCOUNTER — OFFICE VISIT (OUTPATIENT)
Dept: OBGYN CLINIC | Age: 40
End: 2020-10-15

## 2020-10-15 VITALS
TEMPERATURE: 96.9 F | DIASTOLIC BLOOD PRESSURE: 86 MMHG | BODY MASS INDEX: 52.11 KG/M2 | HEART RATE: 77 BPM | SYSTOLIC BLOOD PRESSURE: 124 MMHG | WEIGHT: 293 LBS

## 2020-10-15 PROCEDURE — 99024 POSTOP FOLLOW-UP VISIT: CPT | Performed by: OBSTETRICS & GYNECOLOGY

## 2020-10-15 NOTE — PROGRESS NOTES
Return Gyn Office Visit    CC:   Chief Complaint   Patient presents with    Post-Op Check       HPI:  Chantelle Rodarte is a 47 y.o. female who presents for post-op check s/p hysteroscopy D&C with IUD insertion. Patient is seen and examined today. Patient is doing well today without complaints. Reports pain is well controlled. States she has had some mild cramping. Reports bleeding is minimal.  Denies concerns with bladder function. Reports constipation following the procedure. Denies chest pain, shortness of breath, fever, chills, nausea, vomiting. Denies LE edema or pain. Objective:  /86 (Site: Left Upper Arm, Position: Sitting, Cuff Size: Large Adult)   Pulse 77   Temp 96.9 °F (36.1 °C) (Oral)   Wt (!) 358 lb (162.4 kg)   LMP 09/28/2020 (Exact Date)   BMI 52.11 kg/m²     Physical Exam  Vitals signs reviewed. Constitutional:       General: She is not in acute distress. Appearance: She is well-developed. HENT:      Head: Normocephalic and atraumatic. Eyes:      Conjunctiva/sclera: Conjunctivae normal.   Cardiovascular:      Rate and Rhythm: Normal rate. Pulmonary:      Effort: Pulmonary effort is normal. No respiratory distress. Abdominal:      General: There is no distension. Palpations: Abdomen is soft. Tenderness: There is no abdominal tenderness. There is no guarding or rebound. Musculoskeletal:         General: No swelling. Skin:     General: Skin is warm and dry. Neurological:      Mental Status: She is alert and oriented to person, place, and time. Psychiatric:         Mood and Affect: Mood normal.         Behavior: Behavior normal.         Thought Content:  Thought content normal.       Pathology:   FINAL DIAGNOSIS:     Endometrium, curettage:      - Portions of endometrial polyp.      - Negative for hyperplasia, atypia or malignancy    Assessment/Plan:     Chantelle Rodarte is a 47 y.o. female who presents for post-op check s/p hysteroscopy D&C with IUD insertion. 1. S/P D&C (status post dilation and curettage)     - Patient is doing well without concerns     - Meeting post-operative milestones     - Post-op care and recovery reviewed     - Return precautions reviewed     - Will follow-up in 3 weeks for IUD check    2.  Endometrial polyp     - Benign on pathology       Eitan Gomez DO

## 2020-10-22 ENCOUNTER — TELEPHONE (OUTPATIENT)
Dept: OBGYN CLINIC | Age: 40
End: 2020-10-22

## 2020-11-05 ENCOUNTER — OFFICE VISIT (OUTPATIENT)
Dept: ORTHOPEDIC SURGERY | Age: 40
End: 2020-11-05
Payer: COMMERCIAL

## 2020-11-05 VITALS — HEIGHT: 69 IN | BODY MASS INDEX: 43.4 KG/M2 | WEIGHT: 293 LBS

## 2020-11-05 PROCEDURE — 20610 DRAIN/INJ JOINT/BURSA W/O US: CPT | Performed by: ORTHOPAEDIC SURGERY

## 2020-11-05 PROCEDURE — 99213 OFFICE O/P EST LOW 20 MIN: CPT | Performed by: ORTHOPAEDIC SURGERY

## 2020-11-05 RX ORDER — LIDOCAINE HYDROCHLORIDE 10 MG/ML
4 INJECTION, SOLUTION INFILTRATION; PERINEURAL ONCE
Status: COMPLETED | OUTPATIENT
Start: 2020-11-05 | End: 2020-11-05

## 2020-11-05 RX ORDER — METHYLPREDNISOLONE ACETATE 40 MG/ML
80 INJECTION, SUSPENSION INTRA-ARTICULAR; INTRALESIONAL; INTRAMUSCULAR; SOFT TISSUE ONCE
Status: COMPLETED | OUTPATIENT
Start: 2020-11-05 | End: 2020-11-05

## 2020-11-05 RX ORDER — BUPIVACAINE HYDROCHLORIDE 2.5 MG/ML
4 INJECTION, SOLUTION INFILTRATION; PERINEURAL ONCE
Status: COMPLETED | OUTPATIENT
Start: 2020-11-05 | End: 2020-11-05

## 2020-11-05 RX ADMIN — METHYLPREDNISOLONE ACETATE 80 MG: 40 INJECTION, SUSPENSION INTRA-ARTICULAR; INTRALESIONAL; INTRAMUSCULAR; SOFT TISSUE at 11:36

## 2020-11-05 RX ADMIN — LIDOCAINE HYDROCHLORIDE 4 ML: 10 INJECTION, SOLUTION INFILTRATION; PERINEURAL at 11:35

## 2020-11-05 RX ADMIN — BUPIVACAINE HYDROCHLORIDE 10 MG: 2.5 INJECTION, SOLUTION INFILTRATION; PERINEURAL at 11:34

## 2020-11-05 RX ADMIN — BUPIVACAINE HYDROCHLORIDE 10 MG: 2.5 INJECTION, SOLUTION INFILTRATION; PERINEURAL at 11:35

## 2020-11-05 NOTE — PROGRESS NOTES
Chief Complaint:  Follow-up (CK TORITO KNEES)      SUBJECTIVE:  Amanda Mir is a 47 y.o. female who returns today for evaluation of right and left knee pain, we have been treating her for patellofemoral osteoarthritis, patient has done well with cortisone and Visco supplementation injections in the past.  Current insurance does not approve viscosupplementation. She is here requesting cortisone injections. 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 Current BMI is 53      Pain Assessment:  Pain Assessment  Location of Pain: Knee  Severity of Pain: 8  Quality of Pain: Throbbing, Sharp, Dull, Aching  Duration of Pain: A few minutes  Frequency of Pain: Intermittent  Aggravating Factors: Kneeling, Squatting, Standing, Walking  Limiting Behavior: Yes  Relieving Factors: Rest, Ice  Result of Injury: No  Work-Related Injury: No  Are there other pain locations you wish to document?: No      Medical History:  Patient's medications, allergies, past medical, surgical, social and family histories were reviewed and updated as appropriate. Review of Systems:  Constitutional: negative  Respiratory: negative  Cardiovascular: negative  Musculoskeletal:negative except for Follow-up (CK TORITO KNEES)    Relevant review of systems reviewed and available in the patient's chart in media tab    General Exam:   Constitutional: Patient is adequately groomed with no evidence of malnutrition  Mental Status: The patient is oriented to time, place and person. The patient's mood and affect are appropriate. Vascular: Examination reveals no swelling or calf tenderness. Peripheral pulses are palpable and 2+. OBJECTIVE:  Vital Signs:  Vitals:    11/05/20 1119   Weight: (!) 358 lb (162.4 kg)   Height: 5' 9\" (1.753 m)       Appearance: alert, well appearing, and in no distress, oriented to person, place, and time and overweight.     Right and left physical exam:   positive crepitus in the patella bilaterally with range of motion, tolerates range of motion 0 to 120 degrees, this is limited by body habitus. 5 out of 5 flexion extension strength. ACL, PCL, MCL and LCL are stable with stress exam.  Ambulates with a normal gait. Distal neurovascular exam is intact. MRI images of the left knee were reviewed and show:  Grade 3-4 chondromalacia of the lateral patellofemoral compartment, with 0.8 x 2 cm osteochondral erosion with subchondral pseudocyst formation and reactive osteoedema of the midbody and superior lateral trochlea. No traumatic medial or lateral meniscus tear. No cruciate or collateral ligament injury. Grade 2 chondromalacia of the medial femorotibial compartment, with mild chondral thinning weight-bearing surfaces. TT TG angle measures approximately 15 on MRI      Assessment : Left & right knee patellofemoral osteoarthritis    Impression:  Encounter Diagnosis   Name Primary?  Bilateral primary osteoarthritis of knee Yes       Office Procedures:  Orders Placed This Encounter   Procedures    MA ARTHROCENTESIS ASPIR&/INJ MAJOR JT/BURSA W/O US         Procedures    MA ARTHROCENTESIS ASPIR&/INJ MAJOR JT/BURSA W/O US         Treatment Plan: We will proceed with cortisone injection today. We discussed the importance of diet and exercise for weight loss. We also discussed pursuing viscosupplementation or PRP injections in the future. From a financial standpoint patient is not keen on pursuing those at this time as they are not covered by insurance. The risks and benefits of an injection were discussed with the patient. The patient had full opportunity to ask questions and all were answered. The patient then provided verbal informed consent. The skin was then prepped with betadine solution and alcohol. Under aseptic conditions, the right and left knee was injected with 4cc of 1% lidocaine, 4cc of 0.25% marcaine and 80 mg of Depomedrol.   There were no immediate complications following the injection. The patient was advised of the possibility of injection site reaction and instructed to apply ice to the area.       Carito Leach

## 2020-11-10 ENCOUNTER — TELEPHONE (OUTPATIENT)
Dept: OBGYN CLINIC | Age: 40
End: 2020-11-10

## 2020-11-11 ENCOUNTER — OFFICE VISIT (OUTPATIENT)
Dept: OBGYN CLINIC | Age: 40
End: 2020-11-11
Payer: COMMERCIAL

## 2020-11-11 PROCEDURE — 76856 US EXAM PELVIC COMPLETE: CPT | Performed by: OBSTETRICS & GYNECOLOGY

## 2020-11-11 PROCEDURE — 99212 OFFICE O/P EST SF 10 MIN: CPT | Performed by: OBSTETRICS & GYNECOLOGY

## 2020-11-11 NOTE — PROGRESS NOTES
Return Gyn Office Visit    CC:   Chief Complaint   Patient presents with    Follow-up       HPI:  Miller Sethi is a 47 y.o. female who presents for follow-up after IUD insertion. Patient is seen and examined today. Patient is overall doing well today. Reports mild spotting that has persisted, though decreasing daily. Denies vaginal discharge. Denies dysuria, hematuria. Denies pelvic pain. Denies concerns with bowel changes. Denies chest pain, shortness of breath, fever, chills, nausea, vomiting. Objective:  /78   Pulse 73   Temp 97 °F (36.1 °C)   Wt (!) 353 lb 6.4 oz (160.3 kg)   BMI 52.19 kg/m²     Physical Exam  Vitals signs reviewed. Constitutional:       General: She is not in acute distress. Appearance: She is well-developed. HENT:      Head: Normocephalic and atraumatic. Cardiovascular:      Rate and Rhythm: Normal rate. Pulmonary:      Effort: Pulmonary effort is normal. No respiratory distress. Abdominal:      General: There is no distension. Palpations: Abdomen is soft. Tenderness: There is no abdominal tenderness. There is no guarding or rebound. Skin:     General: Skin is warm and dry. Neurological:      Mental Status: She is alert and oriented to person, place, and time. Psychiatric:         Mood and Affect: Mood normal.         Behavior: Behavior normal.         Thought Content: Thought content normal.          Ultrasound   PELVIC ULTRASOUND without DOPPLER INTERROGATION   NON OB    DATE: 11/11/2020    PHYSICIAN: BRANDT Wyatt D.O.     SONOGRAPHER: SUMIT Rossi [facility]    INDICATION: IUD placement    TYPE OF SCAN: vaginal    FINDINGS:    The cul de sac is normal. No free fluid appreciated. The cervix is normal and not enlarged. Nabothian cyst/s is not noted within the uterine cervix. The uterus measures 7.08cm x 4.43cm x 2.83cm. The uterus is anteverted. The endometrium measures 4.97 mm.    IUD seen within the endometrial canal.  The myometrium is homogeneous in appearance. No uterine anomalies are noted. The right ovary is present and normal.    The right ovary measures 2.05 cm x 1.91 cm x 1.90 cm. Ovary findings:  no masses seen. The right adnexa is normal.    The left ovary is present and normal.    The left ovary measures 2.38 cm x 1.63 cm x 1.94 cm. Ovary findings:  no masses seen. The left adnexa is normal.    IMPRESSION: Normal appearing uterus with an IUD seen in appropriate position in the endometrial canal.  normal appearing right and left ovary. Assessment/Plan:     Rosy Stokes is a 47 y.o. female who presents for follow-up after IUD insertion.     1. Breakthrough bleeding with IUD     - Patient is overall doing well with IUD     - Continued spotting, though decreasing daily     - IUD within appropriate position within the endometrial canal on US today     - Reviewed bleeding and pain profile with IUD     - Patient desires to continue use     - Will follow-up in 1 year and hailey Cerda,

## 2020-11-18 VITALS
TEMPERATURE: 97 F | HEART RATE: 73 BPM | SYSTOLIC BLOOD PRESSURE: 126 MMHG | BODY MASS INDEX: 52.19 KG/M2 | WEIGHT: 293 LBS | DIASTOLIC BLOOD PRESSURE: 78 MMHG

## 2020-11-20 ENCOUNTER — TELEMEDICINE (OUTPATIENT)
Dept: FAMILY MEDICINE CLINIC | Age: 40
End: 2020-11-20
Payer: COMMERCIAL

## 2020-11-20 PROCEDURE — 99214 OFFICE O/P EST MOD 30 MIN: CPT | Performed by: FAMILY MEDICINE

## 2020-11-20 RX ORDER — QUETIAPINE FUMARATE 25 MG/1
TABLET, FILM COATED ORAL
COMMUNITY
Start: 2020-10-26

## 2020-11-20 ASSESSMENT — ENCOUNTER SYMPTOMS
DIARRHEA: 0
TROUBLE SWALLOWING: 0
BLOOD IN STOOL: 0
BACK PAIN: 0
NAUSEA: 0
CONSTIPATION: 0
VOMITING: 0
COUGH: 0
COLOR CHANGE: 0
SHORTNESS OF BREATH: 0
ABDOMINAL PAIN: 0

## 2020-11-20 NOTE — PROGRESS NOTES
once Yes Historical Provider, MD   ibuprofen (ADVIL;MOTRIN) 800 MG tablet Take 1 tablet by mouth every 8 hours as needed for Pain Yes Eitan Gomez DO   clonazePAM (KLONOPIN) 0.5 MG tablet Take 0.5 mg by mouth 2 times daily as needed for Anxiety. Yes JAYCEE Vance CNP   DULoxetine (CYMBALTA) 60 MG extended release capsule Take 1 capsule by mouth every day. Yes Ronni Gerard MD   diclofenac sodium (VOLTAREN) 1 % GEL Apply 2 g topically 4 times daily Yes Maggy Webster DO   FLUoxetine (PROZAC) 10 MG capsule Take 2 capsules by mouth daily Yes Ronni Gerard MD   vitamin D3 (CHOLECALCIFEROL) 25 MCG (1000 UT) TABS tablet Take 2 tablets by mouth daily Yes Tennis Drafts, MD   ARIPiprazole (ABILIFY) 2 MG tablet Take 2 mg by mouth nightly  JAYCEE Vance CNP   traZODone (DESYREL) 50 MG tablet Take 50 mg by mouth nightly  Historical Provider, MD   albuterol sulfate HFA (PROAIR HFA) 108 (90 Base) MCG/ACT inhaler Inhale 2 puffs into the lungs every 6 hours as needed for Wheezing  Patient not taking: Reported on 10/1/2020  Rafita Andrew MD       Social History     Tobacco Use    Smoking status: Never Smoker    Smokeless tobacco: Never Used   Substance Use Topics    Alcohol use:  Yes     Alcohol/week: 1.0 standard drinks     Types: 1 Glasses of wine per week     Comment: occasional    Drug use: No        No Known Allergies,   Past Medical History:   Diagnosis Date    Anxiety and depression 12/7/2018    Anxiety and depression     Exercise-induced asthma 5/15/2020    Menopausal symptoms     Neurologic disorder     Patellar tendinitis of left knee     Primary osteoarthritis of left knee 5/2/2019    Seizures ([address]     last epileptic episode was 12 years ago   ,   Past Surgical History:   Procedure Laterality Date    DILATION AND CURETTAGE OF UTERUS N/A 10/8/2020    VIDEO  HYSTEROSCOPY DILATATION AND CURETTAGE, MYOSURE POLYPECTOMY, INSERTION OF MIRENA performed by [employer] [facility]:  EOM    [x]  Normal  [] Abnormal-  Sclera  [x]  Normal  [] Abnormal -         Discharge []  None visible  [] Abnormal -    HENT:   [x] Normocephalic, atraumatic. [x] Abnormal   [] Mouth/Throat: Mucous membranes are moist.     External Ears [x] Normal  [] Abnormal-     Neck: [x] No visualized mass     Pulmonary/Chest: [x] Respiratory effort normal.  [x] No visualized signs of difficulty breathing or respiratory distress        [] Abnormal-      Musculoskeletal:   [] Normal gait with no signs of ataxia         [x] Normal range of motion of neck        [] Abnormal-       Neurological:        [x] No Facial Asymmetry (Cranial nerve 7 motor function) (limited exam to video visit)          [] No gaze palsy        [] Abnormal-         Skin:        [x] No significant exanthematous lesions or discoloration noted on facial skin         [] Abnormal-            Psychiatric:       [x] Normal Affect [] No Hallucinations        [] Abnormal-     Other pertinent observable physical exam findings-     ASSESSMENT/PLAN:  1. Class 3 severe obesity due to excess calories with body mass index (BMI) of 50.0 to 59.9 in adult, unspecified whether serious comorbidity present [facility])  Nutrition discussed in detail. Healthy content, appropriate portion control, and guidelines for daily exercise (min 30 mins daily of moderate cardio exercise x5 days/week and work up) all heavily discussed in detail. Frequent snacking and intermittent fasting explained. Recommendations made based on pt's health history and lifestyle. She inquired about medications to help with WL. Usually, I recommend 3-6 mo of aggressive lifestyle modifications and at minimum 5% BW loss prior to medication initiation. Pt agreeable. Extensive nutrition counseling provided today, as best possible via VV. Pt to check in with Dr. Faith Bales in 3 months for an in person weight, VS, nutrition review.  Discuss obtaining baseline weights today, although home scale will be used. Will see me in 6 mo to review possibility of medication additions. A1c normal in 6/20  - LIPID PANEL; Future    2. Anxiety and depression  Stable, sees psychiatry, no SI/HI, medications managed per psych     3. Exercise-induced asthma  No recent albuterol usage. Return in about 3 months (around 2/20/2021) for 30 minute visit. Chantelle Rodarte is a 47 y.o. female being evaluated by a Virtual Visit (video visit) encounter to address concerns as mentioned above. A caregiver was present when appropriate. Due to this being a TeleHealth encounter ([facility]), evaluation of the following organ systems was limited: Vitals/Constitutional/EENT/Resp/CV/GI//MS/Neuro/Skin/Heme-Lymph-Imm. Pursuant to the emergency declaration under the [address] [place] [address], [address] [place] [address] authority and the [employer] Act, this Virtual Visit was conducted with patient's (and/or legal guardian's) consent, to reduce the patient's risk of exposure to COVID-19 and provide necessary medical care. The patient (and/or legal guardian) has also been advised to contact this office for worsening conditions or problems, and seek emergency medical treatment and/or call 911 if deemed necessary. Services were provided through a video synchronous discussion virtually to substitute for in-person clinic visit. Patient and provider were located at their individual homes. --Lucia Cleveland MD on 11/20/2020 at 4:53 PM    An electronic signature was used to authenticate this note.

## 2020-12-16 RX ORDER — MELATONIN
2000 DAILY
Qty: 180 TABLET | Refills: 1 | Status: SHIPPED | OUTPATIENT
Start: 2020-12-16 | End: 2021-10-13

## 2020-12-16 NOTE — TELEPHONE ENCOUNTER
Last office visit 11/20/2020     Last written 5- x 1 refill    Next office visit scheduled 2/22/2021    Requested Prescriptions     Pending Prescriptions Disp Refills    vitamin D3 (CHOLECALCIFEROL) 25 MCG (1000 UT) TABS tablet 180 tablet 1     Sig: Take 2 tablets by mouth daily

## 2021-01-06 ENCOUNTER — OFFICE VISIT (OUTPATIENT)
Dept: OBGYN CLINIC | Age: 41
End: 2021-01-06
Payer: COMMERCIAL

## 2021-01-06 VITALS
DIASTOLIC BLOOD PRESSURE: 85 MMHG | TEMPERATURE: 97.9 F | SYSTOLIC BLOOD PRESSURE: 131 MMHG | HEART RATE: 76 BPM | WEIGHT: 293 LBS | BODY MASS INDEX: 53.04 KG/M2

## 2021-01-06 DIAGNOSIS — Z31.69 ENCOUNTER FOR PRECONCEPTION CONSULTATION: Primary | ICD-10-CM

## 2021-01-06 DIAGNOSIS — F32.A ANXIETY AND DEPRESSION: ICD-10-CM

## 2021-01-06 DIAGNOSIS — F41.9 ANXIETY AND DEPRESSION: ICD-10-CM

## 2021-01-06 PROCEDURE — 99212 OFFICE O/P EST SF 10 MIN: CPT | Performed by: OBSTETRICS & GYNECOLOGY

## 2021-01-12 NOTE — PROGRESS NOTES
Return Gyn Office Visit    CC:   Chief Complaint   Patient presents with    Follow-up       HPI:  Ayaka Ruvalcaba is a 47 y.o. female who presents to discuss IUD removal to achieve pregnancy. Patient is seen and examined today. Patient is overall doing well. Patient reports she has thought about this for some time and she desires pregnancy regardless of paternal involvement. Patient reports she has looked into options at a sperm bank and would like to discuss IUD removal for such. Patient follows with Psychiatry and takes Klonopin, Seroquel, trazodone, Prozac and Cymbalta. Objective:  /85 (Site: Right Upper Arm, Position: Sitting, Cuff Size: Large Adult)   Pulse 76   Temp 97.9 °F (36.6 °C) (Oral)   Wt (!) 359 lb 3.2 oz (162.9 kg)   Breastfeeding No   BMI 53.04 kg/m²     Physical Exam  Vitals signs reviewed. Constitutional:       General: She is not in acute distress. Appearance: She is well-developed. HENT:      Head: Normocephalic and atraumatic. Cardiovascular:      Rate and Rhythm: Normal rate. Pulmonary:      Effort: Pulmonary effort is normal. No respiratory distress. Musculoskeletal:         General: No swelling. Skin:     General: Skin is warm and dry. Neurological:      Mental Status: She is alert and oriented to person, place, and time. Psychiatric:         Mood and Affect: Mood normal.         Behavior: Behavior normal.         Thought Content: Thought content normal.       Assessment/Plan:     Ayaka Ruvalcaba is a 47 y.o. female who presents to discuss IUD removal to achieve pregnancy. 1. Encounter for preconception consultation     - Reviewed risks associated with pregnancy     - Reviewed BMI 53.04 and risks associated with pregnancy.   Recommended weight loss management prior to conception.       - Referral placed for nutrition counseling - Reviewed psychiatric medications and risks, including teratogenicity during pregnancy. Recommend patient follow-up with Psychiatry to adjust medications for more pregnancy safe options     - As patient would require IUI with sperm donor will refer to [facility] when patient is ready      - Reviewed daily prenatal vitamins.      - Will follow patient  As needed. 2. BMI 50.0-59.9, adult [facility])     - Referral [facility]    - [facility]     - See above    3.  Anxiety and depression      - See above          Judson Morrow,

## 2021-01-13 ENCOUNTER — TELEPHONE (OUTPATIENT)
Dept: BARIATRICS/WEIGHT MGMT | Age: 41
End: 2021-01-13

## 2021-01-13 NOTE — TELEPHONE ENCOUNTER
Pt is interested in non surgical weight loss only. Pt was referred to us by her OBGYN.  Pt phone number is [phone]

## 2021-01-22 ENCOUNTER — OFFICE VISIT (OUTPATIENT)
Dept: BARIATRICS/WEIGHT MGMT | Age: 41
End: 2021-01-22

## 2021-01-22 VITALS
DIASTOLIC BLOOD PRESSURE: 82 MMHG | HEART RATE: 81 BPM | WEIGHT: 293 LBS | HEIGHT: 69 IN | SYSTOLIC BLOOD PRESSURE: 133 MMHG | BODY MASS INDEX: 43.4 KG/M2 | TEMPERATURE: 96.9 F

## 2021-01-22 DIAGNOSIS — E66.01 MORBID OBESITY WITH BMI OF 50.0-59.9, ADULT (HCC): Primary | ICD-10-CM

## 2021-01-22 PROCEDURE — 99999 PR OFFICE/OUTPT VISIT,PROCEDURE ONLY: CPT

## 2021-01-22 NOTE — PROGRESS NOTES
Alonzo Kat is a 47 y.o. female with a date of birth of 1980. Vitals:    01/22/21 1044   BP: 133/82   Pulse: 81   Temp: 96.9 °F (36.1 °C)   Weight: (!) 355 lb (161 kg)   Height: 5' 8.5\" (1.74 m)   BMI: Body mass index is 53.19 kg/m². Obesity Classification: Class III    Weight History: Wt Readings from Last 3 Encounters:   01/22/21 (!) 355 lb (161 kg)   01/06/21 (!) 359 lb 3.2 oz (162.9 kg)   11/11/20 (!) 353 lb 6.4 oz (160.3 kg)       Patient's lowest adult weight was 285 lb at age 41. Patient's highest adult weight was 365 lb at age 30. Patient has participated in the following weight loss programs: [employer], , LF, Calorie Restriction, Fat Flush and LC. Patient has not participated in meal replacement/liquid diets. Patient has not participated in weight loss medications. Patient is not lactose intolerant. Patient does not have Hinduism/cultural food concerns. Patient does not have food allergies. 24 hour recall/food frequency chart:  Breakfast: yes. Protein shake  Snack: yes. 2 HB egg  Lunch: yes. [employer] avocado salad OR sandwich w/ cutie & babybel cheese  Snack: yes. 2 HB egg  Dinner: yes. Pork chop  Snack: yes. Mini candy cane  Drinks throughout the day: water / eliminated soda (coke zero) 2 weeks ago  Do you drink alcohol? yes. - occasionally 1-2/month (wine OR mixed drink)  *started back working out with the  2x/wk    Patient does meet the criteria for binge eating disorder. Patient does not have grazing. Patient does not have night eating. Patient does have a history of emotional eating or eating out of boredom.       Goals  Weight: wants to get healthier / no specific wt  Health Improvement: wants to get pregnant / mental health    Assessment  Nutritional Needs: RMR=(9.99 x 161) + (6.25 x 174) - (4.92 x 40 y.o.) -161 = 2348 kcal x 1.45 (sedentary/moderate activity factor)= 3404 kcal - 1000 (for 2 lb weight loss/week)= 2404 kcal.    Plan Plan/Recommendations: Start 1800 fara LC meal plan  Optifast:  not interested  Diet Medications:  Not interested  Diet & Exercise ONLY  *pt is hopeful for a pregnancy w/ weight loss    PES Statement:  Overweight/Obesity related to unbalanced  as evidenced by BMI. Body mass index is 53.19 kg/m². Will follow up as necessary.     Joni Denny

## 2021-01-23 ENCOUNTER — TELEMEDICINE (OUTPATIENT)
Dept: BARIATRICS/WEIGHT MGMT | Age: 41
End: 2021-01-23
Payer: COMMERCIAL

## 2021-01-23 DIAGNOSIS — E66.01 MORBID OBESITY WITH BMI OF 50.0-59.9, ADULT (HCC): Primary | ICD-10-CM

## 2021-01-23 DIAGNOSIS — Z71.3 DIETARY COUNSELING AND SURVEILLANCE: ICD-10-CM

## 2021-01-23 PROCEDURE — 99203 OFFICE O/P NEW LOW 30 MIN: CPT | Performed by: FAMILY MEDICINE

## 2021-01-23 ASSESSMENT — ENCOUNTER SYMPTOMS
WHEEZING: 0
NAUSEA: 0
CONSTIPATION: 0
VOMITING: 0
ABDOMINAL PAIN: 0
ABDOMINAL DISTENTION: 0
PHOTOPHOBIA: 0
EYE PAIN: 0
CHEST TIGHTNESS: 0
COUGH: 0
CHOKING: 0
BLOOD IN STOOL: 0
SHORTNESS OF BREATH: 0
DIARRHEA: 0
APNEA: 0

## 2021-01-23 NOTE — PROGRESS NOTES
The patient denies any significant cardiac disease. The patient denies a history thyroid disease. The patient denies a history of glaucoma. The patient denies a history of seizure disorders/epiliepsy. Dietitian's assessment reviewed and addressed with patient. Reviewed:  [x] Nutrition and the importance of regular protein intake  [x] Hidden CHO/carbohydrate sources  [x] Alcohol use  [x] Tobacco use  [x] Drug use- Denies   [x] Importance of exercise and reducing sedentary time        Controlled Substance Monitoring:     No flowsheet data found. No Known Allergies      Current Outpatient Medications:     vitamin D3 (CHOLECALCIFEROL) 25 MCG (1000 UT) TABS tablet, Take 2 tablets by mouth daily, Disp: 180 tablet, Rfl: 1    QUEtiapine (SEROQUEL) 25 MG tablet, TAKE 1 TABLET BY MOUTH EVERYDAY AT BEDTIME, Disp: , Rfl:     levonorgestrel (MIRENA, 52 MG,) IUD 52 mg, 1 each by Intrauterine route once, Disp: , Rfl:     clonazePAM (KLONOPIN) 0.5 MG tablet, Take 0.5 mg by mouth 2 times daily as needed for Anxiety.  , Disp: , Rfl:     DULoxetine (CYMBALTA) 60 MG extended release capsule, Take 1 capsule by mouth every day., Disp: 90 capsule, Rfl: 1    diclofenac sodium (VOLTAREN) 1 % GEL, Apply 2 g topically 4 times daily, Disp: 2 Tube, Rfl: 3    FLUoxetine (PROZAC) 10 MG capsule, Take 2 capsules by mouth daily, Disp: 90 capsule, Rfl: 1    albuterol sulfate HFA (PROAIR HFA) 108 (90 Base) MCG/ACT inhaler, Inhale 2 puffs into the lungs every 6 hours as needed for Wheezing, Disp: 1 Inhaler, Rfl: 2    Patient Active Problem List   Diagnosis    Tenosynovitis of right ankle    Generalized idiopathic epilepsy, not intractable, without status epilepticus (HCC)    Pseudotumor cerebri syndrome    Patellar tendinitis of left knee    Anxiety and depression    Primary osteoarthritis of left knee    Chondromalacia    Exercise-induced asthma    Vitamin D deficiency    Elevated lipoprotein(a)  S/P D&C (status post dilation and curettage)       Past Medical History:   Diagnosis Date    Anxiety and depression 12/7/2018    Anxiety and depression     Exercise-induced asthma 5/15/2020    Menopausal symptoms     Neurologic disorder     Patellar tendinitis of left knee     Primary osteoarthritis of left knee 5/2/2019    Seizures ([address]     last epileptic episode was 12 years ago       Past Surgical History:   Procedure Laterality Date    DILATION AND CURETTAGE OF UTERUS N/A 10/8/2020    VIDEO  HYSTEROSCOPY DILATATION AND CURETTAGE, MYOSURE POLYPECTOMY, INSERTION OF MIRENA performed by Jenny Davila DO at [address]      WRIST SURGERY      Bone spur removal       Family History   Problem Relation Age of Onset    Diabetes Mother     Seizures Mother     Asthma Mother     Arthritis Mother     High Blood Pressure Father     Hypertension Father    Georgianna Olszewski Elevated Lipids Father     Depression Father     High Blood Pressure Brother     Hypertension Brother     Elevated Lipids Brother     Depression Brother     Cancer Maternal Grandmother         ?breast     Depression Sister        Review of Systems   Constitutional: Negative for fatigue. Eyes: Negative for photophobia, pain and visual disturbance. Respiratory: Negative for apnea, cough, choking, chest tightness, shortness of breath and wheezing. Cardiovascular: Negative for chest pain, palpitations and leg swelling. Gastrointestinal: Negative for abdominal distention, abdominal pain, blood in stool, constipation, diarrhea, nausea and vomiting. Endocrine: Negative for cold intolerance and heat intolerance. Musculoskeletal: Negative for arthralgias and myalgias. Skin: Negative for rash. Neurological: Negative for dizziness, tremors, syncope, weakness, numbness and headaches.  GFR  10/08/2020 >60  >60 Final    Comment: Chronic Kidney Disease: less than 60 ml/min/1.73 sq.m. Kidney Failure: less than 15 ml/min/1.73 sq.m. Results valid for patients 18 years and older.  Calcium 10/08/2020 8.9  8.3 - 10.6 mg/dL Final    Pregnancy, Urine 10/08/2020 Negative  Detects HCG level >20 MIU/mL Final    Comment: Note:  Always repeat results in question with a serum  quantitative pregnancy test. A serum hCG is positive  2-5 days before the urine hCG test.           Assessment and Plan:    1. Morbid obesity with BMI of 50.0-59.9, adult ([facility] [address]  Heavily counseled on the importance of therapeutic lifestyle changes through diet and exercise. The patient understands that the goal of treatment is to reach and stay at a healthy weight. The initial treatment goal is to lose at least 5-10% of her body weight in 12 weeks. This will require changes in eating habits, increased physical activity, and behavior changes. Counseled on low carb/healthy fat diet. Start [id number]-[id number]/low carb meal plan as prescribed by dietitian. Patient handouts and education material reviewed in detail with the patient. All questions answered. Encouraged patient to keep a food journal and to bring it to her next visit. Discussed available treatment options in addition to lifestyle changes including medications or OPTIFAST. She is interested in lifestyle modification only at this time. Depending on the patient's success with these changes, she may also be a good candidate for bariatric surgery down the line. Follow-up in 4 weeks. Does not need [facility] access- she is a  and has access to the gym and personal trainers. 2. Dietary counseling and surveillance  Low carb/healthy fat diet. Education: Nutrition label reading, carb counting, meal planning/prep.          Nutrition:  [] LCHF/Ketogenic [x] Low carb/low-calorie diet [] Low-calorie diet     []Maintenance

## 2021-02-20 ENCOUNTER — TELEMEDICINE (OUTPATIENT)
Dept: BARIATRICS/WEIGHT MGMT | Age: 41
End: 2021-02-20
Payer: COMMERCIAL

## 2021-02-20 DIAGNOSIS — Z71.3 DIETARY COUNSELING AND SURVEILLANCE: ICD-10-CM

## 2021-02-20 DIAGNOSIS — E66.01 MORBID OBESITY WITH BMI OF 50.0-59.9, ADULT (HCC): Primary | ICD-10-CM

## 2021-02-20 PROCEDURE — 99213 OFFICE O/P EST LOW 20 MIN: CPT | Performed by: FAMILY MEDICINE

## 2021-02-20 NOTE — PROGRESS NOTES
Patient: Ayaka Ruvalcaba                      Encounter Date: 2/20/2021    YOB: 1980               Age: 47 y.o. Chief Complaint   Patient presents with    Weight Management     Diet Only        Patient identification was verified at the start of the visit. Patient-Reported Vitals 2/20/2021   Patient-Reported Weight 351.4   Patient-Reported Height -   Patient-Reported Temperature 99.3         BP Readings from Last 1 Encounters:   01/22/21 133/82       BMI Readings from Last 1 Encounters:   01/22/21 53.19 kg/m²       Pulse Readings from Last 1 Encounters:   01/22/21 81       Today's weight: 351.4 pounds     HPI: 47 y.o. female with a long-standing history of obesity presents today for a virtual video follow-up. She has lost 4 pounds since her last visit on 1/23. Current treatment includes ~1500-Armin/low carb diet and exercise. Food recall and assessment reviewed. Making good dietary choices. motivated to continue losing weight to help improve overall health and improve fertility. Diet: []LCHF/Ketogenic [x]Modified low-calorie/low carb diet  []Low-calorie diet          []Maintenance       []Other:         Adherent? Somewhat             Exercise: []Cardio     []Resistance/strength training     [x]Other: Limited      No Known Allergies      Current Outpatient Medications:     vitamin D3 (CHOLECALCIFEROL) 25 MCG (1000 UT) TABS tablet, Take 2 tablets by mouth daily, Disp: 180 tablet, Rfl: 1    QUEtiapine (SEROQUEL) 25 MG tablet, TAKE 1 TABLET BY MOUTH EVERYDAY AT BEDTIME, Disp: , Rfl:     levonorgestrel (MIRENA, 52 MG,) IUD 52 mg, 1 each by Intrauterine route once, Disp: , Rfl:     clonazePAM (KLONOPIN) 0.5 MG tablet, Take 0.5 mg by mouth 2 times daily as needed for Anxiety.  , Disp: , Rfl:     DULoxetine (CYMBALTA) 60 MG extended release capsule, Take 1 capsule by mouth every day., Disp: 90 capsule, Rfl: 1   diclofenac sodium (VOLTAREN) 1 % GEL, Apply 2 g topically 4 times daily, Disp: 2 Tube, Rfl: 3    FLUoxetine (PROZAC) 10 MG capsule, Take 2 capsules by mouth daily, Disp: 90 capsule, Rfl: 1    albuterol sulfate HFA (PROAIR HFA) 108 (90 Base) MCG/ACT inhaler, Inhale 2 puffs into the lungs every 6 hours as needed for Wheezing, Disp: 1 Inhaler, Rfl: 2    Patient Active Problem List   Diagnosis    Tenosynovitis of right ankle    Generalized idiopathic epilepsy, not intractable, without status epilepticus (HCC)    Pseudotumor cerebri syndrome    Patellar tendinitis of left knee    Anxiety and depression    Primary osteoarthritis of left knee    Chondromalacia    Exercise-induced asthma    Vitamin D deficiency    Elevated lipoprotein(a)    S/P D&C (status post dilation and curettage)       Review of Systems   Constitutional: Negative for fatigue. Eyes: Negative for photophobia, pain and visual disturbance. Respiratory: Negative for apnea, cough, choking, chest tightness, shortness of breath and wheezing. Cardiovascular: Negative for chest pain, palpitations and leg swelling. Gastrointestinal: Negative for abdominal distention, abdominal pain, blood in stool, constipation, diarrhea, nausea and vomiting. Endocrine: Negative for cold intolerance and heat intolerance. Musculoskeletal: Negative for arthralgias and myalgias. Skin: Negative for rash. Neurological: Negative for dizziness, tremors, syncope, weakness, numbness and headaches. Psychiatric/Behavioral: Negative for agitation, confusion, decreased concentration, dysphoric mood, hallucinations, sleep disturbance and suicidal ideas. The patient is not nervous/anxious and is not hyperactive. Physical Exam  Constitutional:       Appearance: She is well-developed. HENT:      Head: Normocephalic. Eyes:      Conjunctiva/sclera: Conjunctivae normal.   Abdominal:      General: Abdomen is protuberant.    Musculoskeletal: General: No swelling. Neurological:      Mental Status: She is alert and oriented to person, place, and time. Psychiatric:         Mood and Affect: Mood normal.         Behavior: Behavior normal.         Thought Content: Thought content normal.         Judgment: Judgment normal.         Admission on 10/08/2020, Discharged on 10/08/2020   Component Date Value Ref Range Status    WBC 10/08/2020 6.5  4.0 - 11.0 K/uL Final    RBC 10/08/2020 4.27  4.00 - 5.20 M/uL Final    Hemoglobin 10/08/2020 12.1  12.0 - 16.0 g/dL Final    Hematocrit 10/08/2020 36.7  36.0 - 48.0 % Final    MCV 10/08/2020 86.0  80.0 - 100.0 fL Final    MCH 10/08/2020 28.4  26.0 - 34.0 pg Final    MCHC 10/08/2020 33.0  31.0 - 36.0 g/dL Final    RDW 10/08/2020 13.6  12.4 - 15.4 % Final    Platelets 85/96/1490 243  135 - 450 K/uL Final    MPV 10/08/2020 9.0  5.0 - 10.5 fL Final    Sodium 10/08/2020 141  136 - 145 mmol/L Final    Potassium 10/08/2020 3.8  3.5 - 5.1 mmol/L Final    Chloride 10/08/2020 106  99 - 110 mmol/L Final    CO2 10/08/2020 26  21 - 32 mmol/L Final    Anion Gap 10/08/2020 9  3 - 16 Final    Glucose 10/08/2020 96  70 - 99 mg/dL Final    BUN 10/08/2020 11  7 - 20 mg/dL Final    CREATININE 10/08/2020 0.8  0.6 - 1.1 mg/dL Final    GFR Non- 10/08/2020 >60  >60 Final    Comment: >60 mL/min/1.73m2 EGFR, calc. for ages 25 and older using the  MDRD formula (not corrected for weight), is valid for stable  renal function.  GFR  10/08/2020 >60  >60 Final    Comment: Chronic Kidney Disease: less than 60 ml/min/1.73 sq.m. Kidney Failure: less than 15 ml/min/1.73 sq.m. Results valid for patients 18 years and older.       Calcium 10/08/2020 8.9  8.3 - 10.6 mg/dL Final    Pregnancy, Urine 10/08/2020 Negative  Detects HCG level >20 MIU/mL Final    Comment: Note:  Always repeat results in question with a serum  quantitative pregnancy test. A serum hCG is positive 2-5 days before the urine hCG test.           Assessment and Plan:  1. Morbid obesity with BMI of 50.0-59.9, adult ([address]  Reinforced prescribed low carb/armin meal plan. Reviewed healthier, lower carb breakfast options other than cereal.  Reviewed healthy protein and fat sources. Keep food journal.  Increase exercise as able. F/u in 4 weeks per pt request.       2. Dietary counseling and surveillance  1500-Armin/low carb diet. Nutrition plan: [] LCHF/Ketogenic   [x] Modified low-calorie diet (low carb/low-armin)               [] Low-calorie diet    []Maintenance       []Other    Exercise: [x]Cardio     [x]Resistance/strength training                       [x]ACSM recommendations (150 minutes/week in active weight loss)                              Behavior: [x]Motivational interviewing performed    [] Referral for counseling                         [x] Discussed strategies to overcome habits/challenges for focus         [] Stress management   [x] Stimulus control                    [] Sleep hygiene    Reviewed:  [x] Nutrition and the importance of regularprotein intake  [x] Hidden carbohydrate sources  [x] Alcohol use  [x] Tobacco use   [x] Importance of exercise and reducing sedentary time        No orders of the defined types were placed in this encounter. No follow-ups on file. Tino Farah is a 47 y.o. female being evaluated by a Virtual Visit (video visit) encounter to address concerns as mentioned above. A caregiver was present when appropriate. Due to this being a TeleHealth encounter (During GGCWL-55 public health emergency), evaluation of the following organ systems was limited: Vitals/Constitutional/EENT/Resp/CV/GI//MS/Neuro/Skin/Heme-Lymph-Imm. Pursuant to the emergency declaration under the [address] [place] [address] and the [employer] and [employer] Act, this Virtual Visit was conducted with patient's (and/or legal guardian's) consent, to reduce the patient's risk of exposure to COVID-19 and provide necessary medical care. The patient (and/or legal guardian) has also been advised to contact this office for worsening conditions or problems, and seek emergency medical treatment and/or call 911 if deemed necessary. Services were provided through a video synchronous discussion virtually to substitute for in-person clinic visit. Patient and provider were located at their individual homes. --Carlos Alberto Lopez MD on 2/21/2021 at 11:02 PM    An electronic signature was used to authenticate this note. Greater than 50% of this 20 minute visit was used in direct counseling.

## 2021-02-21 ASSESSMENT — ENCOUNTER SYMPTOMS
ABDOMINAL PAIN: 0
EYE PAIN: 0
CONSTIPATION: 0
NAUSEA: 0
DIARRHEA: 0
WHEEZING: 0
COUGH: 0
PHOTOPHOBIA: 0
APNEA: 0
ABDOMINAL DISTENTION: 0
CHEST TIGHTNESS: 0
CHOKING: 0
SHORTNESS OF BREATH: 0
BLOOD IN STOOL: 0
VOMITING: 0

## 2021-03-19 DIAGNOSIS — F41.9 ANXIETY AND DEPRESSION: ICD-10-CM

## 2021-03-19 DIAGNOSIS — F32.A ANXIETY AND DEPRESSION: ICD-10-CM

## 2021-03-19 RX ORDER — DULOXETIN HYDROCHLORIDE 60 MG/1
CAPSULE, DELAYED RELEASE ORAL
Qty: 90 CAPSULE | Refills: 1 | Status: SHIPPED | OUTPATIENT
Start: 2021-03-19 | End: 2021-11-01

## 2021-03-19 NOTE — TELEPHONE ENCOUNTER
Refill Request     Last Seen: 11/20/2020    Last Written: 9/21/2020    Next Appointment:   Future Appointments   Date Time Provider Yoly Bass   3/27/2021 10:15 AM Aaron Dunaway MD [facility]       Message to  to schedule appointment.          Requested Prescriptions     Pending Prescriptions Disp Refills    DULoxetine (CYMBALTA) 60 MG extended release capsule [Pharmacy Med Name: DULOXETINE HCL DR 60 MG CAP] 90 capsule 1     Sig: TAKE 1 CAPSULE BY MOUTH EVERY DAY

## 2021-03-27 ENCOUNTER — TELEMEDICINE (OUTPATIENT)
Dept: BARIATRICS/WEIGHT MGMT | Age: 41
End: 2021-03-27

## 2021-03-27 DIAGNOSIS — Z71.3 DIETARY COUNSELING AND SURVEILLANCE: ICD-10-CM

## 2021-03-27 DIAGNOSIS — E66.01 MORBID OBESITY WITH BMI OF 50.0-59.9, ADULT (HCC): Primary | ICD-10-CM

## 2021-03-27 ASSESSMENT — ENCOUNTER SYMPTOMS
ABDOMINAL PAIN: 0
VOMITING: 0
NAUSEA: 0
APNEA: 0
SHORTNESS OF BREATH: 0
CONSTIPATION: 0
DIARRHEA: 0
WHEEZING: 0
COUGH: 0
CHOKING: 0
PHOTOPHOBIA: 0
CHEST TIGHTNESS: 0
EYE PAIN: 0
ABDOMINAL DISTENTION: 0
BLOOD IN STOOL: 0

## 2021-03-27 NOTE — PROGRESS NOTES
once, Disp: , Rfl:     clonazePAM (KLONOPIN) 0.5 MG tablet, Take 0.5 mg by mouth 2 times daily as needed for Anxiety. , Disp: , Rfl:     diclofenac sodium (VOLTAREN) 1 % GEL, Apply 2 g topically 4 times daily, Disp: 2 Tube, Rfl: 3    FLUoxetine (PROZAC) 10 MG capsule, Take 2 capsules by mouth daily, Disp: 90 capsule, Rfl: 1    albuterol sulfate HFA (PROAIR HFA) 108 (90 Base) MCG/ACT inhaler, Inhale 2 puffs into the lungs every 6 hours as needed for Wheezing, Disp: 1 Inhaler, Rfl: 2    Patient Active Problem List   Diagnosis    Tenosynovitis of right ankle    Generalized idiopathic epilepsy, not intractable, without status epilepticus (HCC)    Pseudotumor cerebri syndrome    Patellar tendinitis of left knee    Anxiety and depression    Primary osteoarthritis of left knee    Chondromalacia    Exercise-induced asthma    Vitamin D deficiency    Elevated lipoprotein(a)    S/P D&C (status post dilation and curettage)       Review of Systems   Constitutional: Negative for fatigue. Eyes: Negative for photophobia, pain and visual disturbance. Respiratory: Negative for apnea, cough, choking, chest tightness, shortness of breath and wheezing. Cardiovascular: Negative for chest pain, palpitations and leg swelling. Gastrointestinal: Negative for abdominal distention, abdominal pain, blood in stool, constipation, diarrhea, nausea and vomiting. Endocrine: Negative for cold intolerance and heat intolerance. Musculoskeletal: Negative for arthralgias and myalgias. Skin: Negative for rash. Neurological: Negative for dizziness, tremors, syncope, weakness, numbness and headaches. Psychiatric/Behavioral: Negative for agitation, confusion, decreased concentration, dysphoric mood, hallucinations, sleep disturbance and suicidal ideas. The patient is not nervous/anxious and is not hyperactive. Physical Exam  Constitutional:       Appearance: She is well-developed. HENT:      Head: Normocephalic. Eyes:      Conjunctiva/sclera: Conjunctivae normal.   Abdominal:      General: Abdomen is protuberant. Musculoskeletal:         General: No swelling. Neurological:      Mental Status: She is alert and oriented to person, place, and time. Psychiatric:         Mood and Affect: Mood normal.         Behavior: Behavior normal.         Thought Content: Thought content normal.         Judgment: Judgment normal.         Admission on 10/08/2020, Discharged on 10/08/2020   Component Date Value Ref Range Status    WBC 10/08/2020 6.5  4.0 - 11.0 K/uL Final    RBC 10/08/2020 4.27  4.00 - 5.20 M/uL Final    Hemoglobin 10/08/2020 12.1  12.0 - 16.0 g/dL Final    Hematocrit 10/08/2020 36.7  36.0 - 48.0 % Final    MCV 10/08/2020 86.0  80.0 - 100.0 fL Final    MCH 10/08/2020 28.4  26.0 - 34.0 pg Final    MCHC 10/08/2020 33.0  31.0 - 36.0 g/dL Final    RDW 10/08/2020 13.6  12.4 - 15.4 % Final    Platelets 51/54/8073 243  135 - 450 K/uL Final    MPV 10/08/2020 9.0  5.0 - 10.5 fL Final    Sodium 10/08/2020 141  136 - 145 mmol/L Final    Potassium 10/08/2020 3.8  3.5 - 5.1 mmol/L Final    Chloride 10/08/2020 106  99 - 110 mmol/L Final    CO2 10/08/2020 26  21 - 32 mmol/L Final    Anion Gap 10/08/2020 9  3 - 16 Final    Glucose 10/08/2020 96  70 - 99 mg/dL Final    BUN 10/08/2020 11  7 - 20 mg/dL Final    CREATININE 10/08/2020 0.8  0.6 - 1.1 mg/dL Final    GFR Non- 10/08/2020 >60  >60 Final    Comment: >60 mL/min/1.73m2 EGFR, calc. for ages 25 and older using the  MDRD formula (not corrected for weight), is valid for stable  renal function.  GFR  10/08/2020 >60  >60 Final    Comment: Chronic Kidney Disease: less than 60 ml/min/1.73 sq.m. Kidney Failure: less than 15 ml/min/1.73 sq.m. Results valid for patients 18 years and older.       Calcium 10/08/2020 8.9  8.3 - 10.6 mg/dL Final    Pregnancy, Urine 10/08/2020 Negative  Detects HCG level >20 MIU/mL Final    Comment: Note:  Always repeat results in question with a serum  quantitative pregnancy test. A serum hCG is positive  2-5 days before the urine hCG test.           Assessment and Plan:  1. Morbid obesity with BMI of 50.0-59.9, adult ([address]  Improving. Continue current management. Counseled stimulus control. 2. Dietary counseling and surveillance  Low carb/fara plan. Nutrition plan: [] LCHF/Ketogenic   [x] Modified low-calorie diet (low carb/low-fara)               [] Low-calorie diet    []Maintenance       []Other    Exercise: [x]Cardio     [x]Resistance/strength training                       [x]ACSM recommendations (150 minutes/week in active weight loss)                              Behavior: [x]Motivational interviewing performed    [] Referral for counseling                         [] Discussed strategies to overcome habits/challenges for focus         [] Stress management   [x] Stimulus control                    [] Sleep hygiene    Reviewed:  [x] Nutrition and the importance of regularprotein intake  [x] Hidden carbohydrate sources  [x] Alcohol use  [x] Tobacco use   [x] Importance of exercise and reducing sedentary time          No orders of the defined types were placed in this encounter. No follow-ups on file. James Paul is a 47 y.o. female being evaluated by a Virtual Visit (video visit) encounter to address concerns as mentioned above. A caregiver was present when appropriate. Due to this being a TeleHealth encounter (During PLGLX-69 public health emergency), evaluation of the following organ systems was limited: Vitals/Constitutional/EENT/Resp/CV/GI//MS/Neuro/Skin/Heme-Lymph-Imm.   Pursuant to the emergency declaration under the [address] [place] [address] authority and the [employer] and [employer] Act, this Virtual Visit was conducted with patient's (and/or legal guardian's) consent, to reduce the patient's risk of exposure to COVID-19 and provide necessary medical care. The patient (and/or legal guardian) has also been advised to contact this office for worsening conditions or problems, and seek emergency medical treatment and/or call 911 if deemed necessary. Services were provided through a video synchronous discussion virtually to substitute for in-person clinic visit. Patient and provider were located at their individual homes. --Carlos Alberto Lopez MD on 3/27/2021 at 12:06 PM    An electronic signature was used to authenticate this note.

## 2021-04-05 ENCOUNTER — OFFICE VISIT (OUTPATIENT)
Dept: FAMILY MEDICINE CLINIC | Age: 41
End: 2021-04-05
Payer: COMMERCIAL

## 2021-04-05 VITALS
BODY MASS INDEX: 43.4 KG/M2 | DIASTOLIC BLOOD PRESSURE: 64 MMHG | HEART RATE: 93 BPM | SYSTOLIC BLOOD PRESSURE: 124 MMHG | WEIGHT: 293 LBS | TEMPERATURE: 98 F | OXYGEN SATURATION: 100 % | HEIGHT: 69 IN

## 2021-04-05 DIAGNOSIS — R20.0 NUMBNESS OF TOES: ICD-10-CM

## 2021-04-05 DIAGNOSIS — S39.012A STRAIN OF MUSCLE, FASCIA AND TENDON OF LOWER BACK, INITIAL ENCOUNTER: ICD-10-CM

## 2021-04-05 DIAGNOSIS — I73.00 RAYNAUD'S PHENOMENON WITHOUT GANGRENE: Primary | ICD-10-CM

## 2021-04-05 PROCEDURE — 99214 OFFICE O/P EST MOD 30 MIN: CPT | Performed by: STUDENT IN AN ORGANIZED HEALTH CARE EDUCATION/TRAINING PROGRAM

## 2021-04-05 NOTE — PROGRESS NOTES
Patient: Florencio Carty is a 47 y.o. female who presents today with the following Chief Complaint(s):  Chief Complaint   Patient presents with    Toe Pain     concerns about raynuds and  toe numbness that has been going on for a couple months shoes dont make a difference    Back Pain     for the last year wanted to see Miane Gonzalez about it last year but its not chronic and is pressure sensitve when doing strenuos activities nothing concerning on past xray          HPI     Reports that both sisters have reynauds  Has cold fingers bilaterally that will go completely white when in cold weather. Then has burning sensation as feeling returns. Numbness to lateral part of 1st toe on left foot. Present for months. Still good balance and movement. Stubbed toe in similar time period but feels that numbness may have predated stub. Back pain  Pain slightly worse with extension. Also noted with palpation of lower back musculature. No bony tenderness  Current Outpatient Medications   Medication Sig Dispense Refill    DULoxetine (CYMBALTA) 60 MG extended release capsule TAKE 1 CAPSULE BY MOUTH EVERY DAY 90 capsule 1    vitamin D3 (CHOLECALCIFEROL) 25 MCG (1000 UT) TABS tablet Take 2 tablets by mouth daily 180 tablet 1    QUEtiapine (SEROQUEL) 25 MG tablet TAKE 1 TABLET BY MOUTH EVERYDAY AT BEDTIME      levonorgestrel (MIRENA, 52 MG,) IUD 52 mg 1 each by Intrauterine route once      clonazePAM (KLONOPIN) 0.5 MG tablet Take 0.5 mg by mouth 2 times daily as needed for Anxiety.  diclofenac sodium (VOLTAREN) 1 % GEL Apply 2 g topically 4 times daily 2 Tube 3    FLUoxetine (PROZAC) 10 MG capsule Take 2 capsules by mouth daily 90 capsule 1    albuterol sulfate HFA (PROAIR HFA) 108 (90 Base) MCG/ACT inhaler Inhale 2 puffs into the lungs every 6 hours as needed for Wheezing 1 Inhaler 2     No current facility-administered medications for this visit.         Patient's past medical history, surgical history, family history, medications,  andallergies  were all reviewed and updated as appropriate today. Review of Systems  All other systems reviewed and negative    Physical Exam  Vitals signs reviewed. Constitutional:       Appearance: Normal appearance. HENT:      Head: Normocephalic and atraumatic. Cardiovascular:      Rate and Rhythm: Normal rate and regular rhythm. Pulmonary:      Effort: Pulmonary effort is normal.      Breath sounds: Normal breath sounds. Musculoskeletal:      Comments: Tight paraspinal musculature in lumbar spine with mild tenderness to palpation. No midline bony tenderness   Neurological:      General: No focal deficit present. Mental Status: She is alert and oriented to person, place, and time. Comments: Numbness to medial edge of left 1st toe. Intact strength and proprioception. Psychiatric:         Behavior: Behavior normal.         Thought Content: Thought content normal.       Vitals:    04/05/21 1438   BP: 124/64   Pulse: 93   Temp: 98 °F (36.7 °C)   SpO2: 100%       Assessment:  Encounter Diagnoses   Name Primary?  Raynaud's phenomenon without gangrene Yes    Strain of muscle, fascia and tendon of lower back, initial encounter     Numbness of toes        Plan:  1. Raynaud's phenomenon without gangrene  History consistent with reynaud's. Discussed conservative management vs daily CCB as treatment. Patient reported that discomfort not currently significant enough to want to start daily medication. Would consider if becoming more bothersome in the future. 2. Strain of muscle, fascia and tendon of lower back, initial encounter  Exam consistent with muscle strain. Discussed PT in the future if not improving with home exercises    3. Numbness of toes  Numbness on lateral edge of 1st toe to MCP without loss of strength, proprioception. Discussed likely injury to superficail nerve in that distribution and no need for further evaluation unless progressive.           No follow-ups

## 2021-05-08 ENCOUNTER — TELEMEDICINE (OUTPATIENT)
Dept: BARIATRICS/WEIGHT MGMT | Age: 41
End: 2021-05-08
Payer: COMMERCIAL

## 2021-05-08 DIAGNOSIS — E66.01 MORBID OBESITY WITH BMI OF 50.0-59.9, ADULT (HCC): Primary | ICD-10-CM

## 2021-05-08 DIAGNOSIS — Z71.3 DIETARY COUNSELING AND SURVEILLANCE: ICD-10-CM

## 2021-05-08 PROCEDURE — 99213 OFFICE O/P EST LOW 20 MIN: CPT | Performed by: FAMILY MEDICINE

## 2021-05-08 ASSESSMENT — ENCOUNTER SYMPTOMS
CHOKING: 0
PHOTOPHOBIA: 0
DIARRHEA: 0
VOMITING: 0
BLOOD IN STOOL: 0
CONSTIPATION: 0
APNEA: 0
SHORTNESS OF BREATH: 0
WHEEZING: 0
ABDOMINAL DISTENTION: 0
EYE PAIN: 0
ABDOMINAL PAIN: 0
COUGH: 0
NAUSEA: 0
CHEST TIGHTNESS: 0

## 2021-05-08 NOTE — PROGRESS NOTES
Patient: Severiano Salvo                      Encounter Date: 5/8/2021    YOB: 1980               Age: 47 y.o. Chief Complaint   Patient presents with    Weight Management     F/u MWM       Patient identification was verified at the start of the visit. Patient-Reported Vitals 5/8/2021   Patient-Reported Weight 344.8   Patient-Reported Height -   Patient-Reported Temperature -         BP Readings from Last 1 Encounters:   04/05/21 124/64       BMI Readings from Last 1 Encounters:   04/05/21 50.59 kg/m²       Pulse Readings from Last 1 Encounters:   04/05/21 93       Self-reported weight: 344 pounds     HPI: 47 y.o. female with a long-standing history of obesity presents today for a virtual video follow-up. She has lost 3 pounds since her last visit. Current treatment includes low carb/fara diet and exercise. Food recall and assessment reviewed. Not following the meal plan consistently. Stress eating. Going to fast foods frequently. Diet: []LCHF/Ketogenic [x]Modified low-calorie/low carb diet  []Low-calorie diet          []Maintenance       []Other:         Adherent? []Yes     [x]No         No Known Allergies      Current Outpatient Medications:     DULoxetine (CYMBALTA) 60 MG extended release capsule, TAKE 1 CAPSULE BY MOUTH EVERY DAY, Disp: 90 capsule, Rfl: 1    vitamin D3 (CHOLECALCIFEROL) 25 MCG (1000 UT) TABS tablet, Take 2 tablets by mouth daily, Disp: 180 tablet, Rfl: 1    QUEtiapine (SEROQUEL) 25 MG tablet, TAKE 1 TABLET BY MOUTH EVERYDAY AT BEDTIME, Disp: , Rfl:     levonorgestrel (MIRENA, 52 MG,) IUD 52 mg, 1 each by Intrauterine route once, Disp: , Rfl:     clonazePAM (KLONOPIN) 0.5 MG tablet, Take 0.5 mg by mouth 2 times daily as needed for Anxiety.  , Disp: , Rfl:     diclofenac sodium (VOLTAREN) 1 % GEL, Apply 2 g topically 4 times daily, Disp: 2 Tube, Rfl: 3    FLUoxetine (PROZAC) 10 MG capsule, Take 2 capsules by mouth daily, Disp: 90 capsule, Rfl: 1    albuterol sulfate plan.  F/u in 8-10 weeks. 2. Dietary counseling and surveillance  As above. Nutrition plan: [] LCHF/Ketogenic   [x] Modified low-calorie diet (low carb/low-fara)               [] Low-calorie diet    []Maintenance       []Other    Exercise: [x]Cardio     [x]Resistance/strength training                       [x]ACSM recommendations (150 minutes/week in active weight loss)                              Behavior: [x]Motivational interviewing performed    [x] Referral for counseling                         [x] Discussed strategies to overcome habits/challenges for focus         [x] Stress management   [x] Stimulus control                    [] Sleep hygiene    Reviewed:  [x] Nutrition and the importance of regularprotein intake  [x] Hidden carbohydrate sources  [x] Alcohol use  [x] Tobacco use   [x] Importance of exercise and reducing sedentary time          No orders of the defined types were placed in this encounter. No follow-ups on file. Bryant Ashraf is a 47 y.o. female being evaluated by a Virtual Visit (video visit) encounter to address concerns as mentioned above. A caregiver was present when appropriate. Due to this being a TeleHealth encounter (During [facility]- public health emergency), evaluation of the following organ systems was limited: Vitals/Constitutional/EENT/Resp/CV/GI//MS/Neuro/Skin/Heme-Lymph-Imm. Pursuant to the emergency declaration under the [address] [place] [address] authority and the [employer] and [employer] Act, this Virtual Visit was conducted with patient's (and/or legal guardian's) consent, to reduce the patient's risk of exposure to COVID-19 and provide necessary medical care. The patient (and/or legal guardian) has also been advised to contact this office for worsening conditions or problems, and seek emergency medical treatment and/or call 911 if deemed necessary.          Services were provided through a video synchronous discussion virtually to substitute for in-person clinic visit. Patient and provider were located at their individual homes. --Constantine Mcgraw MD on 5/8/2021 at 8:34 PM    An electronic signature was used to authenticate this note.

## 2021-05-26 ENCOUNTER — OFFICE VISIT (OUTPATIENT)
Dept: BARIATRICS/WEIGHT MGMT | Age: 41
End: 2021-05-26
Payer: COMMERCIAL

## 2021-05-26 DIAGNOSIS — Z71.89 INDIVIDUAL COUNSELING ENCOUNTER: Primary | ICD-10-CM

## 2021-05-26 PROCEDURE — 96156 HLTH BHV ASSMT/REASSESSMENT: CPT | Performed by: SOCIAL WORKER

## 2021-05-26 NOTE — PROGRESS NOTES
Savanna Joiner is a 47 y.o. female who presents today for individual counseling encounter / psychosocial assessment related to behavioral health. Savanna Joiner is presented open and honest throughout assessment. Patient appeared with appropriate insight and cognition. Presenting Problem:   States she is going through medical weight loss to eventually lose 50 lbs and have a child. Struggles with all or nothing thinking, being very hard on herself. States that she has struggled with food as a reward for her entire life. Home life / Family relationships / Supports:   Patient lives with her dog. Patient reports she is able to talk to her sister and has a great friend group around her who is supportive. Hobbies/Positives:   Walking, enjoys creative things, spending time with friends    Employment hx  Patient is a . 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     Psychiatric hx  Anxiety and depression, takes medication and feels as though it helps    Hx of emotional/stress/bored eating:   \"all of the above. The worse the food is for me, the better. Typically I just reward or treat myself with a bad food\"     Daily Health Concerns/Limitations  Arthritis in knees, ankles, feet, possibly wrists      Substance Use:  n/a    Current needs / Limitations   \"myself. I am hard on myself. I self sabotage from fear of failure and succeeding\"    Additional Questions/Concerns per patient  n/a    Behaviorist overview:   Created goals with patient. Will provide patient with emotional and mindful eating handouts. Discussed mental health and it's impact on motivation, emotional eating and overall lifestyle. Encouraged patient to follow up in 4-6 weeks.      Goals      Make Healther Lifestyle choices      Reduce/manage emotional/bored eating  Practice mindful eating  Monitor mental health               Patient and therapist spent majority of session discussing above

## 2021-09-10 ENCOUNTER — TELEPHONE (OUTPATIENT)
Dept: BARIATRICS/WEIGHT MGMT | Age: 41
End: 2021-09-10

## 2021-09-27 ENCOUNTER — TELEPHONE (OUTPATIENT)
Dept: FAMILY MEDICINE CLINIC | Age: 41
End: 2021-09-27

## 2021-09-27 NOTE — TELEPHONE ENCOUNTER
Pt. States Co-worker tested positive for CoVid 2 weeks ago. She started with nausea, fatigue, and headache two days ago. No fever, cough, congestion, body aches or sore throat. Going out of town soon and she is wondering if she should be seen and have CoVid test.    Pt. Has been vaccinated.

## 2021-09-27 NOTE — TELEPHONE ENCOUNTER
Patient states that it is not necessary any more she took at Rapid Covid test at home and it was negative.

## 2021-10-13 ENCOUNTER — OFFICE VISIT (OUTPATIENT)
Dept: FAMILY MEDICINE CLINIC | Age: 41
End: 2021-10-13
Payer: COMMERCIAL

## 2021-10-13 VITALS
OXYGEN SATURATION: 100 % | BODY MASS INDEX: 51.69 KG/M2 | WEIGHT: 293 LBS | SYSTOLIC BLOOD PRESSURE: 110 MMHG | HEART RATE: 78 BPM | DIASTOLIC BLOOD PRESSURE: 80 MMHG

## 2021-10-13 DIAGNOSIS — Z00.00 ANNUAL PHYSICAL EXAM: Primary | ICD-10-CM

## 2021-10-13 DIAGNOSIS — J45.990 EXERCISE INDUCED BRONCHOSPASM: ICD-10-CM

## 2021-10-13 PROCEDURE — 99214 OFFICE O/P EST MOD 30 MIN: CPT | Performed by: FAMILY MEDICINE

## 2021-10-13 PROCEDURE — 90471 IMMUNIZATION ADMIN: CPT | Performed by: FAMILY MEDICINE

## 2021-10-13 PROCEDURE — 90732 PPSV23 VACC 2 YRS+ SUBQ/IM: CPT | Performed by: FAMILY MEDICINE

## 2021-10-13 RX ORDER — ALBUTEROL SULFATE 90 UG/1
2 AEROSOL, METERED RESPIRATORY (INHALATION) EVERY 6 HOURS PRN
Qty: 1 EACH | Refills: 5 | Status: SHIPPED | OUTPATIENT
Start: 2021-10-13

## 2021-10-13 ASSESSMENT — PATIENT HEALTH QUESTIONNAIRE - PHQ9
SUM OF ALL RESPONSES TO PHQ9 QUESTIONS 1 & 2: 2
SUM OF ALL RESPONSES TO PHQ QUESTIONS 1-9: 2
2. FEELING DOWN, DEPRESSED OR HOPELESS: 1
1. LITTLE INTEREST OR PLEASURE IN DOING THINGS: 1

## 2021-10-13 ASSESSMENT — ANXIETY QUESTIONNAIRES
1. FEELING NERVOUS, ANXIOUS, OR ON EDGE: 1
5. BEING SO RESTLESS THAT IT IS HARD TO SIT STILL: 0
4. TROUBLE RELAXING: 0
2. NOT BEING ABLE TO STOP OR CONTROL WORRYING: 0
7. FEELING AFRAID AS IF SOMETHING AWFUL MIGHT HAPPEN: 0
GAD7 TOTAL SCORE: 1
3. WORRYING TOO MUCH ABOUT DIFFERENT THINGS: 0
6. BECOMING EASILY ANNOYED OR IRRITABLE: 0
IF YOU CHECKED OFF ANY PROBLEMS ON THIS QUESTIONNAIRE, HOW DIFFICULT HAVE THESE PROBLEMS MADE IT FOR YOU TO DO YOUR WORK, TAKE CARE OF THINGS AT HOME, OR GET ALONG WITH OTHER PEOPLE: NOT DIFFICULT AT ALL

## 2021-10-13 NOTE — PROGRESS NOTES
10/13/2021    This is a 46 y.o. female who presents for  Chief Complaint   Patient presents with    6 Month Follow-Up       HPI:     Knee pain: Saw Dr. Abril North, needs b/l knee surgical interventions  Saw [facility], [address], surgery is not covered, working on lifestyle modifications  Starting DBT therapy  Needs refill on albuterol, uses sparingly, no current concerns or Sxs  No acute concerning Sxs: No CP, SOB, palpitations, dizziness, HA, etc.      Past Medical History:   Diagnosis Date    Anxiety and depression 12/7/2018    Anxiety and depression     Exercise-induced asthma 5/15/2020    Menopausal symptoms     Neurologic disorder     Patellar tendinitis of left knee     Primary osteoarthritis of left knee 5/2/2019    Seizures ([address]     last epileptic episode was 12 years ago       Past Surgical History:   Procedure Laterality Date    DILATION AND CURETTAGE OF UTERUS N/A 10/8/2020    VIDEO  HYSTEROSCOPY DILATATION AND CURETTAGE, MYOSURE POLYPECTOMY, INSERTION OF MIRENA performed by Berry Steiner DO at [address]      WRIST SURGERY      Bone spur removal       Social History     Socioeconomic History    Marital status: Single     Spouse name: Not on file    Number of children: Not on file    Years of education: Not on file    Highest education level: Not on file   Occupational History    Not on file   Tobacco Use    Smoking status: Never Smoker    Smokeless tobacco: Never Used   Vaping Use    Vaping Use: Never used   Substance and Sexual Activity    Alcohol use:  Yes     Alcohol/week: 1.0 standard drink     Types: 1 Glasses of wine per week     Comment: occasional    Drug use: No    Sexual activity: Not Currently     Partners: Male   Other Topics Concern    Not on file   Social History Narrative    Not on file     Social Determinants of Health     Financial Resource Strain:     Difficulty of Paying Living Expenses: Not on file   Food Insecurity:     Worried About Running Out of Food in the Last Year: Not on file    Ran Out of Food in the Last Year: Not on file   Transportation Needs:     Lack of Transportation (Medical): Not on file    Lack of Transportation (Non-Medical):  Not on file   Physical Activity:     Days of Exercise per Week: Not on file    Minutes of Exercise per Session: Not on file   Stress:     Feeling of Stress : Not on file   Social Connections:     Frequency of Communication with Friends and Family: Not on file    Frequency of Social Gatherings with Friends and Family: Not on file    Attends Alevism Services: Not on file    Active Member of Clubs or Organizations: Not on file    Attends Club or Organization Meetings: Not on file    Marital Status: Not on file   Intimate Partner Violence:     Fear of Current or Ex-Partner: Not on file    Emotionally Abused: Not on file    Physically Abused: Not on file    Sexually Abused: Not on file   Housing Stability:     Unable to Pay for Housing in the Last Year: Not on file    Number of [place] in the Last Year: Not on file    Unstable Housing in the Last Year: Not on file       Family History   Problem Relation Age of Onset    Diabetes Mother     Seizures Mother     Asthma Mother     Arthritis Mother     High Blood Pressure Father     Hypertension Father     Elevated Lipids Father     Depression Father     High Blood Pressure Brother     Hypertension Brother     Elevated Lipids Brother     Depression Brother     Cancer Maternal Grandmother         ?breast     Depression Sister        Current Outpatient Medications   Medication Sig Dispense Refill    albuterol sulfate HFA (PROAIR HFA) 108 (90 Base) MCG/ACT inhaler Inhale 2 puffs into the lungs every 6 hours as needed for Wheezing 1 each 5    QUEtiapine (SEROQUEL) 25 MG tablet TAKE 1 TABLET BY MOUTH EVERYDAY AT BEDTIME      levonorgestrel (MIRENA, 52 MG,) IUD 52 mg 1 each by Intrauterine route once      clonazePAM (KLONOPIN) 0.5 MG tablet Take 0.5 mg by mouth 2 times daily as needed for Anxiety.  diclofenac sodium (VOLTAREN) 1 % GEL Apply 2 g topically 4 times daily 2 Tube 3    FLUoxetine (PROZAC) 10 MG capsule Take 2 capsules by mouth daily 90 capsule 1    DULoxetine (CYMBALTA) 60 MG extended release capsule TAKE 1 CAPSULE BY MOUTH EVERY DAY 90 capsule 1     No current facility-administered medications for this visit. Immunization History   Administered Date(s) Administered    COVID-19, Pfizer, PF, 30mcg/0.3mL 04/07/2021, 04/28/2021    Influenza Vaccine, unspecified formulation 11/09/2017, 11/09/2018    Influenza Virus Vaccine 03/17/2017, 11/09/2017    Influenza, Quadv, IM, (6 mo and older Fluzone, Flulaval, Fluarix and 3 yrs and older Afluria) 11/09/2017    Influenza, Quadv, IM, PF (6 mo and older Fluzone, Flulaval, Fluarix, and 3 yrs and older Afluria) 11/09/2017, 11/09/2018, 09/18/2020    Pneumococcal Polysaccharide ([id number]) 10/13/2021    Tdap (Boostrix, Adacel) 12/19/2017       No Known Allergies    Review of Systems   Constitutional: Negative for activity change, appetite change, chills, diaphoresis, fatigue and fever. Eyes: Negative for visual disturbance. Respiratory: Negative for chest tightness and shortness of breath. Cardiovascular: Negative for chest pain, palpitations and leg swelling. Gastrointestinal: Negative for abdominal pain, nausea and vomiting. Genitourinary: Negative for decreased urine volume. Skin: Negative for color change. Neurological: Negative for dizziness, weakness, light-headedness, numbness and headaches. /80 (Site: Right Upper Arm, Position: Sitting, Cuff Size: Large Adult)   Pulse 78   Wt (!) 350 lb (158.8 kg)   SpO2 100%   BMI 51.69 kg/m²     Physical Exam  Vitals and nursing note reviewed. Constitutional:       General: She is not in acute distress. Appearance: She is well-developed. She is not diaphoretic.    HENT:      Head: Normocephalic and atraumatic. Eyes:      Conjunctiva/sclera: Conjunctivae normal.      Pupils: Pupils are equal, round, and reactive to light. Neck:      Vascular: No JVD. Cardiovascular:      Rate and Rhythm: Normal rate and regular rhythm. Heart sounds: Normal heart sounds. No murmur heard. No friction rub. No gallop. Pulmonary:      Effort: Pulmonary effort is normal. No respiratory distress. Breath sounds: Normal breath sounds. No wheezing or rales. Chest:      Chest wall: No tenderness. Abdominal:      Palpations: Abdomen is soft. Tenderness: There is no abdominal tenderness. Musculoskeletal:         General: Normal range of motion. Cervical back: Normal range of motion and neck supple. Skin:     General: Skin is warm and dry. Capillary Refill: Capillary refill takes 2 to 3 seconds. Findings: No erythema. Neurological:      Mental Status: She is alert and oriented to person, place, and time. Psychiatric:         Behavior: Behavior normal.         Thought Content: Thought content normal.         Judgment: Judgment normal.         Plan  1. Exercise induced bronchospasm  - albuterol sulfate HFA (PROAIR HFA) 108 (90 Base) MCG/ACT inhaler; Inhale 2 puffs into the lungs every 6 hours as needed for Wheezing  Dispense: 1 each; Refill: 5  - PNEUMOVAX 23 subcutaneous/IM (Pneumococcal polysaccharide vaccine 23-valent >= 1yo)    2. Annual physical exam  - Comprehensive Metabolic Panel; Future  - CBC Auto Differential; Future  - Hemoglobin A1C; Future  - Hepatitis C Antibody; Future  - Lipid Panel; Future  - Vitamin D 25 Hydroxy; Future  - TSH with Reflex; Future        While assessing care for this patient, I have reviewed all pertinent lab work/imaging/ specialist notes and care in reference to those problems addressed above in detail. Appropriate medical decision making was based on this.      Please note that portions of this note may have been completed with a voice recognition program. Efforts were made to edit the dictations but occasionally words are mis-transcribed. Return if symptoms worsen or fail to improve.

## 2021-10-31 DIAGNOSIS — F41.9 ANXIETY AND DEPRESSION: ICD-10-CM

## 2021-10-31 DIAGNOSIS — F32.A ANXIETY AND DEPRESSION: ICD-10-CM

## 2021-11-01 RX ORDER — DULOXETIN HYDROCHLORIDE 60 MG/1
CAPSULE, DELAYED RELEASE ORAL
Qty: 90 CAPSULE | Refills: 1 | Status: SHIPPED | OUTPATIENT
Start: 2021-11-01 | End: 2022-05-13

## 2021-11-01 NOTE — TELEPHONE ENCOUNTER
Refill Request     Last Seen: Last Seen Department: 10/13/2021  Last Seen by PCP: Visit date not found    Last Written: 3/19/21 #90 x 1    Next Appointment:   Future Appointments   Date Time Provider Yoly Shelia   11/13/2021 11:00 AM Annalee Thomas MD [facility]   4/13/2022  3:45 PM Shruti Lindsey MD [facility] [place]           Requested Prescriptions     Pending Prescriptions Disp Refills    DULoxetine (CYMBALTA) 60 MG extended release capsule [Pharmacy Med Name: DULOXETINE HCL DR 60 MG CAP] 90 capsule 1     Sig: TAKE 1 CAPSULE BY MOUTH EVERY DAY

## 2021-11-11 ASSESSMENT — ENCOUNTER SYMPTOMS
COLOR CHANGE: 0
SHORTNESS OF BREATH: 0
CHEST TIGHTNESS: 0
ABDOMINAL PAIN: 0
VOMITING: 0
NAUSEA: 0

## 2021-11-13 ENCOUNTER — TELEMEDICINE (OUTPATIENT)
Dept: BARIATRICS/WEIGHT MGMT | Age: 41
End: 2021-11-13
Payer: COMMERCIAL

## 2021-11-13 DIAGNOSIS — Z71.3 DIETARY COUNSELING AND SURVEILLANCE: ICD-10-CM

## 2021-11-13 DIAGNOSIS — E66.01 MORBID OBESITY WITH BMI OF 50.0-59.9, ADULT (HCC): Primary | ICD-10-CM

## 2021-11-13 PROCEDURE — 99214 OFFICE O/P EST MOD 30 MIN: CPT | Performed by: FAMILY MEDICINE

## 2021-11-13 ASSESSMENT — ENCOUNTER SYMPTOMS
CHOKING: 0
DIARRHEA: 0
APNEA: 0
EYE PAIN: 0
SHORTNESS OF BREATH: 0
ABDOMINAL DISTENTION: 0
CHEST TIGHTNESS: 0
VOMITING: 0
ABDOMINAL PAIN: 0
WHEEZING: 0
NAUSEA: 0
CONSTIPATION: 0
PHOTOPHOBIA: 0
COUGH: 0
BLOOD IN STOOL: 0

## 2021-11-13 NOTE — PROGRESS NOTES
Patient: Vanita Conti                      Encounter Date: 11/13/2021    YOB: 1980               Age: 46 y.o. Chief Complaint   Patient presents with    Weight Management     F/u MWM       Patient identification was verified at the start of the visit. Patient-Reported Vitals 11/13/2021   Patient-Reported Weight 353   Patient-Reported Height 5'10   Patient-Reported Temperature -         BP Readings from Last 1 Encounters:   10/13/21 110/80       BMI Readings from Last 1 Encounters:   10/13/21 51.69 kg/m²       Pulse Readings from Last 1 Encounters:   10/13/21 78     Wt Readings from Last 3 Encounters:   10/13/21 (!) 350 lb (158.8 kg)   04/05/21 (!) 342 lb 9.6 oz (155.4 kg)   01/22/21 (!) 355 lb (161 kg)         Self-reported weight: 353 pounds (11/13)    HPI: 46 y.o. female with a long-standing history of obesity presents today for a virtual video follow-up. She has gained 9 pounds since her last visit in May. Hasn't been following the prescribed meal plan. Not making good dietary choices. Has been eating out frequently. Wants to start losing weight again to be able to proceed with her knee surgery. Orthopedic surgeon recommended BMI in the 40's prior to surgery. No Known Allergies      Current Outpatient Medications:     DULoxetine (CYMBALTA) 60 MG extended release capsule, TAKE 1 CAPSULE BY MOUTH EVERY DAY, Disp: 90 capsule, Rfl: 1    albuterol sulfate HFA (PROAIR HFA) 108 (90 Base) MCG/ACT inhaler, Inhale 2 puffs into the lungs every 6 hours as needed for Wheezing, Disp: 1 each, Rfl: 5    QUEtiapine (SEROQUEL) 25 MG tablet, TAKE 1 TABLET BY MOUTH EVERYDAY AT BEDTIME, Disp: , Rfl:     levonorgestrel (MIRENA, 52 MG,) IUD 52 mg, 1 each by Intrauterine route once, Disp: , Rfl:     clonazePAM (KLONOPIN) 0.5 MG tablet, Take 0.5 mg by mouth 2 times daily as needed for Anxiety.  , Disp: , Rfl:     diclofenac sodium (VOLTAREN) 1 % GEL, Apply 2 g topically 4 times daily, Disp: 2 Tube, Rfl: 3    FLUoxetine (PROZAC) 10 MG capsule, Take 2 capsules by mouth daily, Disp: 90 capsule, Rfl: 1    Patient Active Problem List   Diagnosis    Tenosynovitis of right ankle    Generalized idiopathic epilepsy, not intractable, without status epilepticus ([facility] [address]    Pseudotumor cerebri syndrome    Patellar tendinitis of left knee    Anxiety and depression    Primary osteoarthritis of left knee    Chondromalacia    Exercise-induced asthma    Vitamin D deficiency    Elevated lipoprotein(a)    S/P D&C (status post dilation and curettage)       Review of Systems   Constitutional: Negative for fatigue. Eyes: Negative for photophobia, pain and visual disturbance. Respiratory: Negative for apnea, cough, choking, chest tightness, shortness of breath and wheezing. Cardiovascular: Negative for chest pain, palpitations and leg swelling. Gastrointestinal: Negative for abdominal distention, abdominal pain, blood in stool, constipation, diarrhea, nausea and vomiting. Endocrine: Negative for cold intolerance and heat intolerance. Musculoskeletal: Negative for arthralgias and myalgias. Skin: Negative for rash. Neurological: Negative for dizziness, tremors, syncope, weakness, numbness and headaches. Psychiatric/Behavioral: Negative for agitation, confusion, decreased concentration, dysphoric mood, hallucinations, sleep disturbance and suicidal ideas. The patient is not nervous/anxious and is not hyperactive. Physical Exam  Constitutional:       Appearance: She is well-developed. HENT:      Head: Normocephalic. Eyes:      Conjunctiva/sclera: Conjunctivae normal.   Abdominal:      General: Abdomen is protuberant. Musculoskeletal:         General: No swelling. Neurological:      Mental Status: She is alert and oriented to person, place, and time. Psychiatric:         Mood and Affect: Mood normal.         Behavior: Behavior normal.         Thought Content:  Thought content normal. Judgment: Judgment normal.         Orders Only on 10/14/2021   Component Date Value Ref Range Status    Sodium 10/14/2021 140  136 - 145 mmol/L Final    Potassium 10/14/2021 4.2  3.5 - 5.1 mmol/L Final    Chloride 10/14/2021 105  99 - 110 mmol/L Final    CO2 10/14/2021 24  21 - 32 mmol/L Final    Anion Gap 10/14/2021 11  3 - 16 Final    Glucose 10/14/2021 79  70 - 99 mg/dL Final    BUN 10/14/2021 12  7 - 20 mg/dL Final    CREATININE 10/14/2021 0.8  0.6 - 1.1 mg/dL Final    GFR Non- 10/14/2021 >60  >60 Final    Comment: >60 mL/min/1.73m2 EGFR, calc. for ages 25 and older using the  MDRD formula (not corrected for weight), is valid for stable  renal function.  GFR  10/14/2021 >60  >60 Final    Comment: Chronic Kidney Disease: less than 60 ml/min/1.73 sq.m. Kidney Failure: less than 15 ml/min/1.73 sq.m. Results valid for patients 18 years and older.       Calcium 10/14/2021 8.7  8.3 - 10.6 mg/dL Final    Total Protein 10/14/2021 6.3* 6.4 - 8.2 g/dL Final    Albumin 10/14/2021 3.6  3.4 - 5.0 g/dL Final    Albumin/Globulin Ratio 10/14/2021 1.3  1.1 - 2.2 Final    Total Bilirubin 10/14/2021 0.8  0.0 - 1.0 mg/dL Final    Alkaline Phosphatase 10/14/2021 72  40 - 129 U/L Final    ALT 10/14/2021 9* 10 - 40 U/L Final    AST 10/14/2021 14* 15 - 37 U/L Final    Globulin 10/14/2021 2.7  Not Established g/dL Final    WBC 10/14/2021 7.0  4.0 - 11.0 K/uL Final    RBC 10/14/2021 4.41  4.00 - 5.20 M/uL Final    Hemoglobin 10/14/2021 12.4  12.0 - 16.0 g/dL Final    Hematocrit 10/14/2021 39.0  36.0 - 48.0 % Final    MCV 10/14/2021 88.3  80.0 - 100.0 fL Final    MCH 10/14/2021 28.2  26.0 - 34.0 pg Final    MCHC 10/14/2021 31.9  31.0 - 36.0 g/dL Final    RDW 10/14/2021 14.6  12.4 - 15.4 % Final    Platelets 32/59/5056 230  135 - 450 K/uL Final    MPV 10/14/2021 10.4  5.0 - 10.5 fL Final    Neutrophils % 10/14/2021 70.9  % Final    Lymphocytes % 10/14/2021 21.5  % Final    Monocytes % 10/14/2021 7.1  % Final    Eosinophils % 10/14/2021 0.1  % Final    Basophils % 10/14/2021 0.4  % Final    Neutrophils Absolute 10/14/2021 5.0  1.7 - 7.7 K/uL Final    Lymphocytes Absolute 10/14/2021 1.5  1.0 - 5.1 K/uL Final    Monocytes Absolute 10/14/2021 0.5  0.0 - 1.3 K/uL Final    Eosinophils Absolute 10/14/2021 0.0  0.0 - 0.6 K/uL Final    Basophils Absolute 10/14/2021 0.0  0.0 - 0.2 K/uL Final    Hemoglobin A1C 10/14/2021 5.2  See comment % Final    Comment: Comment:  Diagnosis of Diabetes: > or = 6.5%  Increased risk of diabetes (Prediabetes): 5.7-6.4%  Glycemic Control: Nonpregnant Adults: <7.0%                    Pregnant: <6.0%        eAG 10/14/2021 102.5  mg/dL Final    Hep C Ab Interp 10/14/2021 Non-reactive  Non-reactive Final    Cholesterol, Total 10/14/2021 177  0 - 199 mg/dL Final    Triglycerides 10/14/2021 40  0 - 150 mg/dL Final    HDL 10/14/2021 56  40 - 60 mg/dL Final    LDL Calculated 10/14/2021 113* <100 mg/dL Final    VLDL Cholesterol Calculated 10/14/2021 8  Not Established mg/dL Final    Vit D, 25-Hydroxy 10/14/2021 24.7* >=30 ng/mL Final    Comment: <=20 ng/mL. ........... Lorene Rast Deficient  21-29 ng/mL. ......... Lorene Rast Insufficient  >=30 ng/mL. ........ Lorene Rast Sufficient      TSH 10/14/2021 1.95  0.27 - 4.20 uIU/mL Final         Assessment and Plan:  1. Morbid obesity with BMI of 50.0-59.9, adult [facility])  Reviewed available tx options including aom, Optifast and bariatric surgery. She is interested in Optifast.  Recent labs reviewed. Start Optifast 1100-Calorie meal plan with 4 meal replacements and 1 low carb/fara meal daily. Meal plan will be provided and reviewed with the dietitian. Counseled on proper use and potential side effects. Follow-up in 2 weeks. Report any side effects. - Vitamin B12 & Folate; Future    2. Dietary counseling and surveillance  Optifast 4:1 meal plan.           Exercise: [x]Cardio     []Resistance/strength training [x]ACSM recommendations (150 minutes/week in active weight loss)- recommend aquatic exercises                             Behavior: [x]Motivational interviewing performed    [] Referral for counseling                         [x] Discussed strategies to overcome habits/challenges for focus         [x] Stress management   [x] Stimulus control                    [x] Sleep hygiene    Reviewed:  [x] Nutrition and the importance of regularprotein intake  [x] Hidden carbohydrate sources  [x] Alcohol use  [x] Tobacco use   [x] Importance of exercise and reducing sedentary time      Treatment start date: 11/15/21  Starting weight: 353 pounds       Orders Placed This Encounter   Procedures    Vitamin B12 & Folate     Standing Status:   Future     Standing Expiration Date:   11/13/2022       No follow-ups on file. Jan Moses is a 46 y.o. female being evaluated by a Virtual Visit (video visit) encounter to address concerns as mentioned above. A caregiver was present when appropriate. Due to this being a TeleHealth encounter (During ZHCOW-90 public health emergency), evaluation of the following organ systems was limited: Vitals/Constitutional/EENT/Resp/CV/GI//MS/Neuro/Skin/Heme-Lymph-Imm. Pursuant to the emergency declaration under the [address] [place] [address] authority and the [employer] and [employer] Act, this Virtual Visit was conducted with patient's (and/or legal guardian's) consent, to reduce the patient's risk of exposure to COVID-19 and provide necessary medical care. The patient (and/or legal guardian) has also been advised to contact this office for worsening conditions or problems, and seek emergency medical treatment and/or call 911 if deemed necessary. Services were provided through a video synchronous discussion virtually to substitute for in-person clinic visit.  Patient and provider were located at their individual homes. --Jamir Minaya MD on 11/13/2021 at 12:38 PM    An electronic signature was used to authenticate this note.

## 2021-11-15 ENCOUNTER — TELEPHONE (OUTPATIENT)
Dept: BARIATRICS/WEIGHT MGMT | Age: 41
End: 2021-11-15

## 2021-11-15 DIAGNOSIS — E66.01 MORBID OBESITY WITH BMI OF 50.0-59.9, ADULT (HCC): ICD-10-CM

## 2021-11-15 NOTE — TELEPHONE ENCOUNTER
Called pt to review 4:1 Optifast plan. 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 Encouraged pt to eat food meal prior to class and time products after class to ensure she has enough energy. Pt verbalized understanding and has no further questions at this time.

## 2021-11-16 LAB
FOLATE: 6.44 NG/ML (ref 4.78–24.2)
VITAMIN B-12: 434 PG/ML (ref 211–911)

## 2021-12-04 ENCOUNTER — TELEMEDICINE (OUTPATIENT)
Dept: BARIATRICS/WEIGHT MGMT | Age: 41
End: 2021-12-04
Payer: COMMERCIAL

## 2021-12-04 DIAGNOSIS — E66.01 MORBID OBESITY WITH BMI OF 50.0-59.9, ADULT (HCC): Primary | ICD-10-CM

## 2021-12-04 DIAGNOSIS — Z71.3 DIETARY COUNSELING AND SURVEILLANCE: ICD-10-CM

## 2021-12-04 PROCEDURE — 99213 OFFICE O/P EST LOW 20 MIN: CPT | Performed by: FAMILY MEDICINE

## 2021-12-04 ASSESSMENT — ENCOUNTER SYMPTOMS
NAUSEA: 0
APNEA: 0
CHOKING: 0
CHEST TIGHTNESS: 0
DIARRHEA: 0
COUGH: 0
ABDOMINAL PAIN: 0
WHEEZING: 0
ABDOMINAL DISTENTION: 0
BLOOD IN STOOL: 0
VOMITING: 0
PHOTOPHOBIA: 0
CONSTIPATION: 0
SHORTNESS OF BREATH: 0
EYE PAIN: 0

## 2021-12-04 NOTE — PROGRESS NOTES
Patient: Gino Postal                      Encounter Date: 12/4/2021    YOB: 1980               Age: 46 y.o. Chief Complaint   Patient presents with    Weight Management     F/u MWM       Patient identification was verified at the start of the visit. Patient-Reported Vitals 11/13/2021   Patient-Reported Weight 353   Patient-Reported Height 5'10   Patient-Reported Temperature -         BP Readings from Last 1 Encounters:   10/13/21 110/80       BMI Readings from Last 1 Encounters:   10/13/21 51.69 kg/m²       Pulse Readings from Last 1 Encounters:   10/13/21 78           Self-reported weight: 342 pounds (11/13)    HPI: 46 y.o. female with a long-standing history of  presents today for a virtual video follow-up. She has lost 11 pounds since her last visit. Current treatment includes Optifast 4:1 diet and exercise. Food recall and assessment reviewed. Following the meal plan. Happy with weight loss. Motivated to continue losing weight. Diet: Optifast 4:1 meal plan       Adherent? [x]Yes     []No       Side effects: No         Exercise: []Cardio     []Resistance/strength training     [x]Other: Water aerobics     No Known Allergies      Current Outpatient Medications:     DULoxetine (CYMBALTA) 60 MG extended release capsule, TAKE 1 CAPSULE BY MOUTH EVERY DAY, Disp: 90 capsule, Rfl: 1    albuterol sulfate HFA (PROAIR HFA) 108 (90 Base) MCG/ACT inhaler, Inhale 2 puffs into the lungs every 6 hours as needed for Wheezing, Disp: 1 each, Rfl: 5    QUEtiapine (SEROQUEL) 25 MG tablet, TAKE 1 TABLET BY MOUTH EVERYDAY AT BEDTIME, Disp: , Rfl:     levonorgestrel (MIRENA, 52 MG,) IUD 52 mg, 1 each by Intrauterine route once, Disp: , Rfl:     clonazePAM (KLONOPIN) 0.5 MG tablet, Take 0.5 mg by mouth 2 times daily as needed for Anxiety.  , Disp: , Rfl:     diclofenac sodium (VOLTAREN) 1 % GEL, Apply 2 g topically 4 times daily, Disp: 2 Tube, Rfl: 3    FLUoxetine (PROZAC) 10 MG capsule, Take 2 capsules by mouth daily, Disp: 90 capsule, Rfl: 1    Patient Active Problem List   Diagnosis    Tenosynovitis of right ankle    Generalized idiopathic epilepsy, not intractable, without status epilepticus ([facility] [address]    Pseudotumor cerebri syndrome    Patellar tendinitis of left knee    Anxiety and depression    Primary osteoarthritis of left knee    Chondromalacia    Exercise-induced asthma    Vitamin D deficiency    Elevated lipoprotein(a)    S/P D&C (status post dilation and curettage)       Review of Systems   Constitutional: Negative for fatigue. Eyes: Negative for photophobia, pain and visual disturbance. Respiratory: Negative for apnea, cough, choking, chest tightness, shortness of breath and wheezing. Cardiovascular: Negative for chest pain, palpitations and leg swelling. Gastrointestinal: Negative for abdominal distention, abdominal pain, blood in stool, constipation, diarrhea, nausea and vomiting. Endocrine: Negative for cold intolerance and heat intolerance. Musculoskeletal: Negative for arthralgias and myalgias. Skin: Negative for rash. Neurological: Negative for dizziness, tremors, syncope, weakness, numbness and headaches. Psychiatric/Behavioral: Negative for agitation, confusion, decreased concentration, dysphoric mood, hallucinations, sleep disturbance and suicidal ideas. The patient is not nervous/anxious and is not hyperactive. Physical Exam  Constitutional:       Appearance: She is well-developed. HENT:      Head: Normocephalic. Eyes:      Conjunctiva/sclera: Conjunctivae normal.   Abdominal:      General: Abdomen is protuberant. Musculoskeletal:         General: No swelling. Neurological:      Mental Status: She is alert and oriented to person, place, and time. Psychiatric:         Mood and Affect: Mood normal.         Behavior: Behavior normal.         Thought Content:  Thought content normal.         Judgment: Judgment normal.         Orders Only on 11/15/2021   Component Date Value Ref Range Status    Vitamin B-12 11/15/2021 434  211 - 911 pg/mL Final    Folate 11/15/2021 6.44  4.78 - 24.20 ng/mL Final    Comment: Effective 11-15-16 10:00am EST  Please note reference ranges have  changed for Folate. Assessment and Plan:  1. Morbid obesity with BMI of 50.0-59.9, adult ([address]  Improving. Continue current management. F/u 4 weeks. 2. Dietary counseling and surveillance  Optifast [address] meal plan. Exercise: [x]Cardio     []Resistance/strength training                       [x]ACSM recommendations (150 minutes/week in active weight loss)                              Behavior: [x]Motivational interviewing performed    [] Referral for counseling                         [x] Discussed strategies to overcome habits/challenges for focus         [] Stress management   [x] Stimulus control                    [] Sleep hygiene    Reviewed:  [x] Nutrition and the importance of regularprotein intake  [x] Hidden carbohydrate sources  [x] Alcohol use  [x] Tobacco use   [x] Importance of exercise and reducing sedentary time      Treatment start date: 11/15/21  Total weight loss: 11 pounds     No orders of the defined types were placed in this encounter. No follow-ups on file. Alber Aguirre is a 46 y.o. female being evaluated by a Virtual Visit (video visit) encounter to address concerns as mentioned above. A caregiver was present when appropriate. Due to this being a TeleHealth encounter ([facility]), evaluation of the following organ systems was limited: Vitals/Constitutional/EENT/Resp/CV/GI//MS/Neuro/Skin/Heme-Lymph-Imm.   Pursuant to the emergency declaration under the [facility] waiver authority and the [employer] and [employer] Act, this Virtual Visit was conducted with patient's (and/or legal guardian's) consent, to reduce the patient's risk of exposure to COVID-19 and provide necessary medical care. The patient (and/or legal guardian) has also been advised to contact this office for worsening conditions or problems, and seek emergency medical treatment and/or call 911 if deemed necessary. Services were provided through a video synchronous discussion virtually to substitute for in-person clinic visit. Patient and provider were located at their individual homes. --Mukund Bunn MD on 12/4/2021 at 3:07 PM    An electronic signature was used to authenticate this note.

## 2022-01-04 ENCOUNTER — NURSE TRIAGE (OUTPATIENT)
Dept: OTHER | Facility: CLINIC | Age: 42
End: 2022-01-04

## 2022-01-04 NOTE — TELEPHONE ENCOUNTER
Received call from [facility] at [facility] with Red Flag Complaint. Subjective: Caller states \"having respiratory symptoms with recent covid exp. \"     Current Symptoms: cough, runny nose, fever    Onset: 1 day ago; sudden    Associated Symptoms: reduced activity    Pain Severity: 0/10; N/A; none    Temperature: 99.1orally    What has been tried: tylenol    LMP: IUD Pregnant: No    Recommended disposition: to be seen in next 24 hours by pcp or ucc if appt not available    Care advice provided, patient verbalizes understanding; denies any other questions or concerns; instructed to call back for any new or worsening symptoms. Writer provided warm transfer to [facility] at [facility] for appointment scheduling     Attention Provider: Thank you for allowing me to participate in the care of your patient. The patient was connected to triage in response to information provided to the ECC/PSC. Please do not respond through this encounter as the response is not directed to a shared pool.     Reminders:     Call [address] Provider/Office    Care Advice - both instruction and documentation    Routing - PCP (and NP for 2nd level triage)    PLEASE DELETE ALL RED TEXT PRIOR TO SIGNING NOTE        Reason for Disposition   SEVERE coughing spells (e.g., whooping sound after coughing, vomiting after coughing)    Protocols used: COUGH - ACUTE NON-PRODUCTIVE-ADULT-

## 2022-01-05 ENCOUNTER — TELEMEDICINE (OUTPATIENT)
Dept: FAMILY MEDICINE CLINIC | Age: 42
End: 2022-01-05
Payer: COMMERCIAL

## 2022-01-05 DIAGNOSIS — R05.9 COUGH: Primary | ICD-10-CM

## 2022-01-05 DIAGNOSIS — U07.1 COVID: ICD-10-CM

## 2022-01-05 DIAGNOSIS — J02.9 ACUTE PHARYNGITIS, UNSPECIFIED ETIOLOGY: ICD-10-CM

## 2022-01-05 PROCEDURE — 99213 OFFICE O/P EST LOW 20 MIN: CPT | Performed by: NURSE PRACTITIONER

## 2022-01-05 RX ORDER — BENZONATATE 100 MG/1
100 CAPSULE ORAL 3 TIMES DAILY PRN
Qty: 30 CAPSULE | Refills: 0 | Status: SHIPPED | OUTPATIENT
Start: 2022-01-05

## 2022-01-05 NOTE — PROGRESS NOTES
Butler Mohs (:  1980) is a 46 y.o. female,Established patient, here for evaluation of the following chief complaint(s): Headache (x 2 days, was told to take OTC mucinex, tested positive for COVID yesterday), Nasal Congestion, and Pharyngitis         ASSESSMENT/PLAN:  1. Cough  -     benzonatate (TESSALON PERLES) 100 MG capsule; Take 1 capsule by mouth 3 times daily as needed for Cough, Disp-30 capsule, R-0Normal    Continue mucinex syrup, advil  Recommend cough drops in addition  Increase fluid intake, small amounts frequently  Avoid sleeping for extended periods of time  Food in small amounts frequently  Monitor symptoms and contact office if fails to improve or worsens. She feels she is stable at this time  Feels sore throat is related to cough  Will add tessalon for addition cough control. No follow-ups on file. SUBJECTIVE/OBJECTIVE:  HPI       Tested positive for covid yesterday at [facility], rapid test. Monday started with cough, scratchy throat, sleepy. PM temp 100.7. Worsening sore throat. Cough non productive. Denies wheezing and SOB. Taking advil, mucinex cough syrup. Temp now normal since yesterday. Not tested for strep at [facility]. Feels related to the cough. Works at [employer] and at [employer]. Has a note from the [facility] for work. Review of Systems   All other systems reviewed and are negative.       Patient-Reported Vitals 2022   Patient-Reported Weight 335 lbs   Patient-Reported Height 5 10   Patient-Reported Temperature 96.9        Physical Exam    [INSTRUCTIONS:  \"[x]\" Indicates a positive item  \"[]\" Indicates a negative item  -- DELETE ALL ITEMS NOT EXAMINED]    Constitutional: [x] Appears well-developed and well-nourished [x] No apparent distress      [] Abnormal -     Mental status: [x] Alert and awake  [x] Oriented to person/place/time [x] Able to follow commands    [] Abnormal -     Eyes:   EOM    [x]  Normal    [] Abnormal - Sclera  [x]  Normal    [] Abnormal -          Discharge [x]  None visible   [] Abnormal -     HENT: [x] Normocephalic, atraumatic  [] Abnormal -   [x] Mouth/Throat: Mucous membranes are moist    External Ears [x] Normal  [] Abnormal -    Neck: [x] No visualized mass [] Abnormal -     Pulmonary/Chest: [x] Respiratory effort normal   [x] No visualized signs of difficulty breathing or respiratory distress        [] Abnormal -      Musculoskeletal:   [x] Normal gait with no signs of ataxia         [x] Normal range of motion of neck        [] Abnormal -     Neurological:        [x] No Facial Asymmetry (Cranial nerve 7 motor function) (limited exam due to video visit)          [x] No gaze palsy        [] Abnormal -          Skin:        [x] No significant exanthematous lesions or discoloration noted on facial skin         [] Abnormal -            Psychiatric:       [x] Normal Affect [] Abnormal -        [x] No Hallucinations    Other pertinent observable physical exam findings:-                Danna Berger, was evaluated through a synchronous (real-time) audio-video encounter. The patient (or guardian if applicable) is aware that this is a billable service. Verbal consent to proceed has been obtained within the past 12 months. The visit was conducted pursuant to the emergency declaration under the [address] [place] [address] authority and the [employer] and [employer] General Act. Patient identification was verified, and a caregiver was present when appropriate. The patient was located in a state where the provider was credentialed to provide care.       An electronic signature was used to authenticate this note.    --JAYCEE NATH - CNP

## 2022-01-08 ENCOUNTER — TELEMEDICINE (OUTPATIENT)
Dept: BARIATRICS/WEIGHT MGMT | Age: 42
End: 2022-01-08
Payer: COMMERCIAL

## 2022-01-08 DIAGNOSIS — E66.01 MORBID OBESITY WITH BMI OF 50.0-59.9, ADULT (HCC): Primary | ICD-10-CM

## 2022-01-08 DIAGNOSIS — Z71.3 DIETARY COUNSELING AND SURVEILLANCE: ICD-10-CM

## 2022-01-08 PROCEDURE — 99213 OFFICE O/P EST LOW 20 MIN: CPT | Performed by: FAMILY MEDICINE

## 2022-01-08 ASSESSMENT — ENCOUNTER SYMPTOMS
SHORTNESS OF BREATH: 0
WHEEZING: 0
DIARRHEA: 0
EYE PAIN: 0
VOMITING: 0
ABDOMINAL DISTENTION: 0
CHEST TIGHTNESS: 0
ABDOMINAL PAIN: 0
CHOKING: 0
BLOOD IN STOOL: 0
PHOTOPHOBIA: 0
COUGH: 0
APNEA: 0
NAUSEA: 0
CONSTIPATION: 0

## 2022-01-08 NOTE — PROGRESS NOTES
Patient: Becky Spence                      Encounter Date: 1/8/2022    YOB: 1980               Age: 46 y.o. Chief Complaint   Patient presents with    Weight Management     F/u Optifast       Patient identification was verified at the start of the visit. Patient-Reported Vitals 1/8/2022   Patient-Reported Weight 334   Patient-Reported Height 510   Patient-Reported Temperature -         BP Readings from Last 1 Encounters:   10/13/21 110/80       BMI Readings from Last 1 Encounters:   10/13/21 51.69 kg/m²       Pulse Readings from Last 1 Encounters:   10/13/21 78       Self-reported weight: 342 pounds (11/13)     HPI: 46 y.o. female with a long-standing history of  presents today for a virtual video follow-up. She has lost 8 pounds since her last visit. Current treatment includes Optifast 4:1 diet and exercise. Following the meal plan on most days. Has had a few \"cheat\" snacks/meals. Happy with weight loss. Motivated to continue losing weight. Tested positive for COVID earlier this week  Mild symptoms        Diet: Optifast 4:1 meal plan        Adherent? [x]? Yes     []? No                             Side effects: No          Exercise: []? Cardio     []? Resistance/strength training     [x]? Other: Water aerobics      No Known Allergies    No Known Allergies      Current Outpatient Medications:     benzonatate (TESSALON PERLES) 100 MG capsule, Take 1 capsule by mouth 3 times daily as needed for Cough, Disp: 30 capsule, Rfl: 0    DULoxetine (CYMBALTA) 60 MG extended release capsule, TAKE 1 CAPSULE BY MOUTH EVERY DAY, Disp: 90 capsule, Rfl: 1    albuterol sulfate HFA (PROAIR HFA) 108 (90 Base) MCG/ACT inhaler, Inhale 2 puffs into the lungs every 6 hours as needed for Wheezing, Disp: 1 each, Rfl: 5    QUEtiapine (SEROQUEL) 25 MG tablet, TAKE 1 TABLET BY MOUTH EVERYDAY AT BEDTIME, Disp: , Rfl:     levonorgestrel (MIRENA, 52 MG,) IUD 52 mg, 1 each by Intrauterine route once, Disp: , Rfl:    clonazePAM (KLONOPIN) 0.5 MG tablet, Take 0.5 mg by mouth 2 times daily as needed for Anxiety. , Disp: , Rfl:     diclofenac sodium (VOLTAREN) 1 % GEL, Apply 2 g topically 4 times daily, Disp: 2 Tube, Rfl: 3    FLUoxetine (PROZAC) 10 MG capsule, Take 2 capsules by mouth daily, Disp: 90 capsule, Rfl: 1    Patient Active Problem List   Diagnosis    Tenosynovitis of right ankle    Generalized idiopathic epilepsy, not intractable, without status epilepticus ([facility] [address]    Pseudotumor cerebri syndrome    Patellar tendinitis of left knee    Anxiety and depression    Primary osteoarthritis of left knee    Chondromalacia    Exercise-induced asthma    Vitamin D deficiency    Elevated lipoprotein(a)    S/P D&C (status post dilation and curettage)       Review of Systems   Constitutional: Negative for fatigue. Eyes: Negative for photophobia, pain and visual disturbance. Respiratory: Negative for apnea, cough, choking, chest tightness, shortness of breath and wheezing. Cardiovascular: Negative for chest pain, palpitations and leg swelling. Gastrointestinal: Negative for abdominal distention, abdominal pain, blood in stool, constipation, diarrhea, nausea and vomiting. Endocrine: Negative for cold intolerance and heat intolerance. Musculoskeletal: Negative for arthralgias and myalgias. Skin: Negative for rash. Neurological: Negative for dizziness, tremors, syncope, weakness, numbness and headaches. Psychiatric/Behavioral: Negative for agitation, confusion, decreased concentration, dysphoric mood, hallucinations, sleep disturbance and suicidal ideas. The patient is not nervous/anxious and is not hyperactive. Physical Exam  Constitutional:       Appearance: She is well-developed. HENT:      Head: Normocephalic. Eyes:      Conjunctiva/sclera: Conjunctivae normal.   Abdominal:      General: Abdomen is protuberant. Musculoskeletal:         General: No swelling.    Neurological:      Mental Status: She is alert and oriented to person, place, and time. Psychiatric:         Mood and Affect: Mood normal.         Behavior: Behavior normal.         Thought Content: Thought content normal.         Judgment: Judgment normal.         Orders Only on 11/15/2021   Component Date Value Ref Range Status    Vitamin B-12 11/15/2021 434  211 - 911 pg/mL Final    Folate 11/15/2021 6.44  4.78 - 24.20 ng/mL Final    Comment: Effective 11-15-16 10:00am EST  Please note reference ranges have  changed for Folate. Assessment and Plan:  1. Morbid obesity with BMI of 50.0-59.9, adult ([address]  Improving. Encouraged following meal plan as prescribed. Avoiding triggers foods/deviating from plan. F/u 4 weeks. 2. Dietary counseling and surveillance  Optifast [address] plan. Exercise: [x]Cardio     []Resistance/strength training                       [x]ACSM recommendations (150 minutes/week in active weight loss)- as able/tolerated                               Behavior: [x]Motivational interviewing performed    [] Referral for counseling                         [x] Discussed strategies to overcome habits/challenges for focus         [] Stress management   [x] Stimulus control                    [] Sleep hygiene    Reviewed:  [x] Nutrition and the importance of regularprotein intake  [x] Hidden carbohydrate sources  [x] Alcohol use  [x] Tobacco use   [x] Importance of exercise and reducing sedentary time      Treatment start date: 11/15/21  Total weight loss: 19 pounds     No orders of the defined types were placed in this encounter. Return in about 4 weeks (around 2/5/2022) for MWM, diet and exercise only. Liv Mcdonald is a 46 y.o. female being evaluated by a Virtual Visit (video visit) encounter to address concerns as mentioned above. A caregiver was present when appropriate.  Due to this being a TeleHealth encounter (During [facility] public health emergency), evaluation of the following organ systems was limited: Vitals/Constitutional/EENT/Resp/CV/GI//MS/Neuro/Skin/Heme-Lymph-Imm. Pursuant to the emergency declaration under the [address] [place] [address] and the [employer] and [employer] Act, this Virtual Visit was conducted with patient's (and/or legal guardian's) consent, to reduce the patient's risk of exposure to COVID-19 and provide necessary medical care. The patient (and/or legal guardian) has also been advised to contact this office for worsening conditions or problems, and seek emergency medical treatment and/or call 911 if deemed necessary. Services were provided through a video synchronous discussion virtually to substitute for in-person clinic visit. Patient and provider were located at their individual homes. --Edouard Gonzalez MD on 1/8/2022 at 12:13 PM    An electronic signature was used to authenticate this note.

## 2022-01-10 VITALS — WEIGHT: 293 LBS | BODY MASS INDEX: 43.4 KG/M2 | HEIGHT: 69 IN

## 2022-02-04 ENCOUNTER — TELEMEDICINE (OUTPATIENT)
Dept: BARIATRICS/WEIGHT MGMT | Age: 42
End: 2022-02-04
Payer: COMMERCIAL

## 2022-02-04 DIAGNOSIS — E66.01 MORBID OBESITY WITH BMI OF 45.0-49.9, ADULT (HCC): Primary | ICD-10-CM

## 2022-02-04 DIAGNOSIS — Z71.3 DIETARY COUNSELING AND SURVEILLANCE: ICD-10-CM

## 2022-02-04 PROCEDURE — 99213 OFFICE O/P EST LOW 20 MIN: CPT | Performed by: FAMILY MEDICINE

## 2022-02-04 ASSESSMENT — ENCOUNTER SYMPTOMS
NAUSEA: 0
ABDOMINAL DISTENTION: 0
CONSTIPATION: 0
EYE PAIN: 0
VOMITING: 0
DIARRHEA: 0
BLOOD IN STOOL: 0
COUGH: 0
WHEEZING: 0
SHORTNESS OF BREATH: 0
ABDOMINAL PAIN: 0
CHEST TIGHTNESS: 0
CHOKING: 0
PHOTOPHOBIA: 0
APNEA: 0

## 2022-02-04 NOTE — PROGRESS NOTES
Patient: Renay Tran                      Encounter Date: 2/4/2022    YOB: 1980               Age: 46 y.o. Chief Complaint   Patient presents with    Weight Management     F/u MWM- Optifast       Patient identification was verified at the start of the visit. Patient-Reported Vitals 2/4/2022   Patient-Reported Weight 333   Patient-Reported Height -   Patient-Reported Temperature -         BP Readings from Last 1 Encounters:   10/13/21 110/80       BMI Readings from Last 1 Encounters:   01/08/22 49.32 kg/m²       Pulse Readings from Last 1 Encounters:   10/13/21 78       Self-reported weight: 333 pounds (11/13)     HPI: 41 y.o. female with a long-standing history of  presents today for a virtual video follow-up. She has lost 1 pound since her last visit. Current treatment includes Optifast 4:1 diet and exercise. Hasn't been following the meal plan. Wants to continue Optifast.       Diet: Optifast 4:1 meal plan       Adherent? []Yes     [x]No       Side effects: No        Exercise: []Cardio     []Resistance/strength training     [x]Other: Limited     No Known Allergies      Current Outpatient Medications:     benzonatate (TESSALON PERLES) 100 MG capsule, Take 1 capsule by mouth 3 times daily as needed for Cough, Disp: 30 capsule, Rfl: 0    DULoxetine (CYMBALTA) 60 MG extended release capsule, TAKE 1 CAPSULE BY MOUTH EVERY DAY, Disp: 90 capsule, Rfl: 1    albuterol sulfate HFA (PROAIR HFA) 108 (90 Base) MCG/ACT inhaler, Inhale 2 puffs into the lungs every 6 hours as needed for Wheezing, Disp: 1 each, Rfl: 5    QUEtiapine (SEROQUEL) 25 MG tablet, TAKE 1 TABLET BY MOUTH EVERYDAY AT BEDTIME, Disp: , Rfl:     levonorgestrel (MIRENA, 52 MG,) IUD 52 mg, 1 each by Intrauterine route once, Disp: , Rfl:     clonazePAM (KLONOPIN) 0.5 MG tablet, Take 0.5 mg by mouth 2 times daily as needed for Anxiety.  , Disp: , Rfl:     diclofenac sodium (VOLTAREN) 1 % GEL, Apply 2 g topically 4 times daily, Disp: 2 Tube, Rfl: 3    FLUoxetine (PROZAC) 10 MG capsule, Take 2 capsules by mouth daily, Disp: 90 capsule, Rfl: 1    Patient Active Problem List   Diagnosis    Tenosynovitis of right ankle    Generalized idiopathic epilepsy, not intractable, without status epilepticus ([facility] [address]    Pseudotumor cerebri syndrome    Patellar tendinitis of left knee    Anxiety and depression    Primary osteoarthritis of left knee    Chondromalacia    Exercise-induced asthma    Vitamin D deficiency    Elevated lipoprotein(a)    S/P D&C (status post dilation and curettage)       Review of Systems   Constitutional: Negative for fatigue. Eyes: Negative for photophobia, pain and visual disturbance. Respiratory: Negative for apnea, cough, choking, chest tightness, shortness of breath and wheezing. Cardiovascular: Negative for chest pain, palpitations and leg swelling. Gastrointestinal: Negative for abdominal distention, abdominal pain, blood in stool, constipation, diarrhea, nausea and vomiting. Endocrine: Negative for cold intolerance and heat intolerance. Musculoskeletal: Negative for arthralgias and myalgias. Skin: Negative for rash. Neurological: Negative for dizziness, tremors, syncope, weakness, numbness and headaches. Psychiatric/Behavioral: Negative for agitation, confusion, decreased concentration, dysphoric mood, hallucinations, sleep disturbance and suicidal ideas. The patient is not nervous/anxious and is not hyperactive. Physical Exam  Constitutional:       Appearance: She is well-developed. HENT:      Head: Normocephalic. Eyes:      Conjunctiva/sclera: Conjunctivae normal.   Abdominal:      General: Abdomen is protuberant. Musculoskeletal:         General: No swelling. Neurological:      Mental Status: She is alert and oriented to person, place, and time. Psychiatric:         Mood and Affect: Mood normal.         Behavior: Behavior normal.         Thought Content:  Thought content normal. Judgment: Judgment normal.         Orders Only on 11/15/2021   Component Date Value Ref Range Status    Vitamin B-12 11/15/2021 434  211 - 911 pg/mL Final    Folate 11/15/2021 6.44  4.78 - 24.20 ng/mL Final    Comment: Effective 11-15-16 10:00am EST  Please note reference ranges have  changed for Folate. Assessment and Plan:  1. Morbid obesity with BMI of 45.0-49.9, adult (HCC)  Slow weight loss. Non-adherent to prescribed meal plan. Counseled on the importance of following the meal plan as prescribed for max potential benefits/weight loss. Avoid trigger foods. Schedule 1:1 with dietitian. F/u 4 weeks. 2. Dietary counseling and surveillance  Optifast 4:1 meal plan. Exercise: [x]Cardio     []Resistance/strength training                       [x]ACSM recommendations (150 minutes/week in active weight loss)                              Behavior: [x]Motivational interviewing performed    [] Referral for counseling                         [x] Discussed strategies to overcome habits/challenges for focus         [] Stress management   [x] Stimulus control                    [] Sleep hygiene    Reviewed:  [x] Nutrition and the importance of regular protein intake  [x] Hidden carbohydrate sources  [x] Alcohol use  [x] Tobacco use   [x] Importance of exercise and reducing sedentary time      Treatment start date: 11/15/21  Total weight loss: 20 pounds     No orders of the defined types were placed in this encounter. No follow-ups on file. Ev Lopez is a 46 y.o. female being evaluated by a Virtual Visit (video visit) encounter to address concerns as mentioned above. A caregiver was present when appropriate. Due to this being a TeleHealth encounter ([facility]), evaluation of the following organ systems was limited: Vitals/Constitutional/EENT/Resp/CV/GI//MS/Neuro/Skin/Heme-Lymph-Imm.   Pursuant to the emergency declaration under the [address] and the Dorado Boyers, [address] [place] [address] [facility] authority and the Coronavirus Preparedness and [employer] Act, this Virtual Visit was conducted with patient's (and/or legal guardian's) consent, to reduce the patient's risk of exposure to COVID-19 and provide necessary medical care. The patient (and/or legal guardian) has also been advised to contact this office for worsening conditions or problems, and seek emergency medical treatment and/or call 911 if deemed necessary. Services were provided through a video synchronous discussion virtually to substitute for in-person clinic visit. Patient and provider were located at their individual homes. --Larry Isabel MD on 2/12/2022 at 1:36 PM    An electronic signature was used to authenticate this note.

## 2022-03-04 ENCOUNTER — TELEMEDICINE (OUTPATIENT)
Dept: BARIATRICS/WEIGHT MGMT | Age: 42
End: 2022-03-04
Payer: COMMERCIAL

## 2022-03-04 DIAGNOSIS — Z71.3 DIETARY COUNSELING AND SURVEILLANCE: ICD-10-CM

## 2022-03-04 DIAGNOSIS — E66.01 MORBID OBESITY WITH BMI OF 45.0-49.9, ADULT (HCC): Primary | ICD-10-CM

## 2022-03-04 PROCEDURE — 99213 OFFICE O/P EST LOW 20 MIN: CPT | Performed by: FAMILY MEDICINE

## 2022-03-04 ASSESSMENT — ENCOUNTER SYMPTOMS
DIARRHEA: 0
ABDOMINAL PAIN: 0
BLOOD IN STOOL: 0
CONSTIPATION: 0
EYE PAIN: 0
CHOKING: 0
CHEST TIGHTNESS: 0
PHOTOPHOBIA: 0
VOMITING: 0
APNEA: 0
COUGH: 0
NAUSEA: 0
WHEEZING: 0
SHORTNESS OF BREATH: 0
ABDOMINAL DISTENTION: 0

## 2022-03-04 NOTE — PROGRESS NOTES
Patient: Danna Berger                      Encounter Date: 3/4/2022    YOB: 1980               Age: 46 y.o. Chief Complaint   Patient presents with    Weight Management     [facility]       Patient identification was verified at the start of the visit. Patient-Reported Vitals 3/4/2022   Patient-Reported Weight 332   Patient-Reported Height -   Patient-Reported Temperature -         BP Readings from Last 1 Encounters:   10/13/21 110/80       BMI Readings from Last 1 Encounters:   01/08/22 49.32 kg/m²       Pulse Readings from Last 1 Encounters:   10/13/21 78            HPI: 46 y.o. female with a long-standing history of obesity presents today for a virtual video follow-up. She has lost a pound since her last visit. Hasn't been following the prescribed Optifast meal plan. Too restrictive. On wait list to see behavioral counselor to help with stress/emotional eating. Would like to focus on lifestyle management. Diet: Optifast 4:1        Adherent? []Yes     [x]No           No Known Allergies      Current Outpatient Medications:     benzonatate (TESSALON PERLES) 100 MG capsule, Take 1 capsule by mouth 3 times daily as needed for Cough, Disp: 30 capsule, Rfl: 0    DULoxetine (CYMBALTA) 60 MG extended release capsule, TAKE 1 CAPSULE BY MOUTH EVERY DAY, Disp: 90 capsule, Rfl: 1    albuterol sulfate HFA (PROAIR HFA) 108 (90 Base) MCG/ACT inhaler, Inhale 2 puffs into the lungs every 6 hours as needed for Wheezing, Disp: 1 each, Rfl: 5    QUEtiapine (SEROQUEL) 25 MG tablet, TAKE 1 TABLET BY MOUTH EVERYDAY AT BEDTIME, Disp: , Rfl:     levonorgestrel (MIRENA, 52 MG,) IUD 52 mg, 1 each by Intrauterine route once, Disp: , Rfl:     clonazePAM (KLONOPIN) 0.5 MG tablet, Take 0.5 mg by mouth 2 times daily as needed for Anxiety.  , Disp: , Rfl:     diclofenac sodium (VOLTAREN) 1 % GEL, Apply 2 g topically 4 times daily, Disp: 2 Tube, Rfl: 3    FLUoxetine (PROZAC) 10 MG capsule, Take 2 capsules by mouth daily, Disp: 90 capsule, Rfl: 1    Patient Active Problem List   Diagnosis    Tenosynovitis of right ankle    Generalized idiopathic epilepsy, not intractable, without status epilepticus ([facility] [address]    Pseudotumor cerebri syndrome    Patellar tendinitis of left knee    Anxiety and depression    Primary osteoarthritis of left knee    Chondromalacia    Exercise-induced asthma    Vitamin D deficiency    Elevated lipoprotein(a)    S/P D&C (status post dilation and curettage)       Review of Systems   Constitutional: Negative for fatigue. Eyes: Negative for photophobia, pain and visual disturbance. Respiratory: Negative for apnea, cough, choking, chest tightness, shortness of breath and wheezing. Cardiovascular: Negative for chest pain, palpitations and leg swelling. Gastrointestinal: Negative for abdominal distention, abdominal pain, blood in stool, constipation, diarrhea, nausea and vomiting. Endocrine: Negative for cold intolerance and heat intolerance. Musculoskeletal: Negative for arthralgias and myalgias. Skin: Negative for rash. Neurological: Negative for dizziness, tremors, syncope, weakness, numbness and headaches. Psychiatric/Behavioral: Negative for agitation, confusion, decreased concentration, dysphoric mood, hallucinations, sleep disturbance and suicidal ideas. The patient is not nervous/anxious and is not hyperactive. Physical Exam  Constitutional:       Appearance: She is well-developed. HENT:      Head: Normocephalic. Eyes:      Conjunctiva/sclera: Conjunctivae normal.   Abdominal:      General: Abdomen is protuberant. Musculoskeletal:         General: No swelling. Neurological:      Mental Status: She is alert and oriented to person, place, and time. Psychiatric:         Mood and Affect: Mood normal.         Behavior: Behavior normal.         Thought Content:  Thought content normal.         Judgment: Judgment normal.         Orders Only on 11/15/2021 Component Date Value Ref Range Status    Vitamin B-12 11/15/2021 434  211 - 911 pg/mL Final    Folate 11/15/2021 6.44  4.78 - 24.20 ng/mL Final    Comment: Effective 11-15-16 10:00am EST  Please note reference ranges have  changed for Folate. Assessment and Plan:  1. Morbid obesity with BMI of 45.0-49.9, adult (HCC)  Stable. D/c Optifast per pt request.   Reviewed avaialble tx options including anti-obesity medications. She is interested in continuing lifestyle management only. F/u prn.     2. Dietary counseling and surveillance  General dietary counseling provided. Exercise: []Cardio     []Resistance/strength training                       [x]ACSM recommendations (150 minutes/week in active weight loss)                              Behavior: [x]Motivational interviewing performed    [] Referral for counseling                         [x] Discussed strategies to overcome habits/challenges for focus         [] Stress management   [x] Stimulus control                    [] Sleep hygiene    Reviewed:  [x] Nutrition and the importance of regularprotein intake  [x] Hidden carbohydrate sources  [x] Alcohol use  [x] Tobacco use   [x] Importance of exercise and reducing sedentary time      Treatment start date: 11/15/21  Total weight loss: 21 pounds     No orders of the defined types were placed in this encounter. No follow-ups on file. Teodora Sinclair is a 46 y.o. female being evaluated by a Virtual Visit (video visit) encounter to address concerns as mentioned above. A caregiver was present when appropriate. Due to this being a TeleHealth encounter (During [facility] public health emergency), evaluation of the following organ systems was limited: Vitals/Constitutional/EENT/Resp/CV/GI//MS/Neuro/Skin/Heme-Lymph-Imm.   Pursuant to the emergency declaration under the [facility] waiver authority and the [employer] Appropriations Act, this Virtual Visit was conducted with patient's (and/or legal guardian's) consent, to reduce the patient's risk of exposure to COVID-19 and provide necessary medical care. The patient (and/or legal guardian) has also been advised to contact this office for worsening conditions or problems, and seek emergency medical treatment and/or call 911 if deemed necessary. Services were provided through a video synchronous discussion virtually to substitute for in-person clinic visit. Patient and provider were located at their individual homes. --Jose Ruiz MD on 3/4/2022 at 2:40 PM    An electronic signature was used to authenticate this note. Elian Monteiro

## 2022-04-21 ENCOUNTER — OFFICE VISIT (OUTPATIENT)
Dept: FAMILY MEDICINE CLINIC | Age: 42
End: 2022-04-21
Payer: COMMERCIAL

## 2022-04-21 VITALS
DIASTOLIC BLOOD PRESSURE: 80 MMHG | HEART RATE: 69 BPM | WEIGHT: 293 LBS | SYSTOLIC BLOOD PRESSURE: 133 MMHG | HEIGHT: 70 IN | OXYGEN SATURATION: 96 % | BODY MASS INDEX: 41.95 KG/M2

## 2022-04-21 DIAGNOSIS — M72.2 PLANTAR FASCIITIS: ICD-10-CM

## 2022-04-21 DIAGNOSIS — E55.9 VITAMIN D DEFICIENCY: ICD-10-CM

## 2022-04-21 PROCEDURE — 99214 OFFICE O/P EST MOD 30 MIN: CPT | Performed by: FAMILY MEDICINE

## 2022-04-21 ASSESSMENT — ENCOUNTER SYMPTOMS
SHORTNESS OF BREATH: 0
ABDOMINAL PAIN: 0
COLOR CHANGE: 0
BACK PAIN: 0
VOMITING: 0
NAUSEA: 0

## 2022-04-21 NOTE — PROGRESS NOTES
4/21/2022    This is a 46 y.o. female who presents for  Chief Complaint   Patient presents with    6 Month Follow-Up       HPI:     Last saw me in 11/21  Knee pain: Saw Dr. Chepe Brooks" needs b/l knee surgical interventions\"  Switched to Dr. Brianne Pham at [facility]     On albuterol, uses sparingly, no current concerns or Sxs  No acute concerning Sxs: No CP, SOB, palpitations, dizziness, HA, etc.      Following with non-surgical weight management, Dr. Juventino nieto for 4 mo, lost 20 lbs   Not sustainable   Too expensive   Has done weight watchers, lost 80 lbs   On a waiting list for a psychiatrist list rec'd by their office to address psychological component     + plantar fasciitis, chronic, flaring  Has orthotics, braces  Does the stretches nightly  Has a spiky ball to use       Past Medical History:   Diagnosis Date    Anxiety and depression 12/7/2018    Anxiety and depression     BMI 45.0-49.9, adult ([address] 4/21/2022    Exercise-induced asthma 5/15/2020    Menopausal symptoms     Neurologic disorder     Patellar tendinitis of left knee     Primary osteoarthritis of left knee 5/2/2019    Seizures ([address]     last epileptic episode was 12 years ago       Past Surgical History:   Procedure Laterality Date    DILATION AND CURETTAGE OF UTERUS N/A 10/8/2020    VIDEO  HYSTEROSCOPY DILATATION AND CURETTAGE, MYOSURE POLYPECTOMY, INSERTION OF MIRENA performed by Violeta Munoz DO at [address]      WRIST SURGERY      Bone spur removal       Social History     Socioeconomic History    Marital status: Single     Spouse name: Not on file    Number of children: Not on file    Years of education: Not on file    Highest education level: Not on file   Occupational History    Not on file   Tobacco Use    Smoking status: Never Smoker    Smokeless tobacco: Never Used   Vaping Use    Vaping Use: Never used   Substance and Sexual Activity    Alcohol use:  Yes     Alcohol/week: 1.0 standard drink     Types: 1 Glasses of wine per week     Comment: occasional    Drug use: No    Sexual activity: Not Currently     Partners: Male   Other Topics Concern    Not on file   Social History Narrative    Not on file     Social Determinants of Health     Financial Resource Strain:     Difficulty of Paying Living Expenses: Not on file   Food Insecurity:     Worried About Running Out of Food in the Last Year: Not on file    Ana María of Food in the Last Year: Not on file   Transportation Needs:     Lack of Transportation (Medical): Not on file    Lack of Transportation (Non-Medical):  Not on file   Physical Activity:     Days of Exercise per Week: Not on file    Minutes of Exercise per Session: Not on file   Stress:     Feeling of Stress : Not on file   Social Connections:     Frequency of Communication with Friends and Family: Not on file    Frequency of Social Gatherings with Friends and Family: Not on file    Attends [employer]ism Services: Not on file    Active Member of [address] [place] [address] or Organizations: Not on file    Attends Club or Organization Meetings: Not on file    Marital Status: Not on file   Intimate Partner Violence:     Fear of Current or Ex-Partner: Not on file    Emotionally Abused: Not on file    Physically Abused: Not on file    Sexually Abused: Not on file   Housing Stability:     Unable to Pay for Housing in the Last Year: Not on file    Number of [place] in the Last Year: Not on file    Unstable Housing in the Last Year: Not on file       Family History   Problem Relation Age of Onset    Diabetes Mother     Seizures Mother     Asthma Mother     Arthritis Mother     High Blood Pressure Father     Hypertension Father     Elevated Lipids Father     Depression Father     High Blood Pressure Brother     Hypertension Brother     Elevated Lipids Brother     Depression Brother     Cancer Maternal Grandmother         ?breast     Depression Sister        Current Outpatient Medications   Medication Sig Dispense Refill    benzonatate (TESSALON PERLES) 100 MG capsule Take 1 capsule by mouth 3 times daily as needed for Cough 30 capsule 0    DULoxetine (CYMBALTA) 60 MG extended release capsule TAKE 1 CAPSULE BY MOUTH EVERY DAY 90 capsule 1    albuterol sulfate HFA (PROAIR HFA) 108 (90 Base) MCG/ACT inhaler Inhale 2 puffs into the lungs every 6 hours as needed for Wheezing 1 each 5    QUEtiapine (SEROQUEL) 25 MG tablet TAKE 1 TABLET BY MOUTH EVERYDAY AT BEDTIME      levonorgestrel (MIRENA, 52 MG,) IUD 52 mg 1 each by Intrauterine route once      diclofenac sodium (VOLTAREN) 1 % GEL Apply 2 g topically 4 times daily 2 Tube 3    FLUoxetine (PROZAC) 10 MG capsule Take 2 capsules by mouth daily 90 capsule 1     No current facility-administered medications for this visit. Immunization History   Administered Date(s) Administered    COVID-19, Pfizer Purple top, DILUTE for use, 12+ yrs, 30mcg/0.3mL dose 04/07/2021, 04/28/2021    Influenza Vaccine, unspecified formulation 11/09/2017, 11/09/2018    Influenza Virus Vaccine 03/17/2017, 11/09/2017    Influenza, Quadv, IM, (6 mo and older Fluzone, Flulaval, Fluarix and 3 yrs and older Afluria) 11/09/2017    Influenza, Quadv, IM, PF (6 mo and older Fluzone, Flulaval, Fluarix, and 3 yrs and older Afluria) 11/09/2017, 11/09/2018, 09/18/2020    Pneumococcal Polysaccharide ([id number]) 10/13/2021    Tdap (Boostrix, Adacel) 12/19/2017       No Known Allergies    Review of Systems   Constitutional: Negative for activity change, chills, diaphoresis, fatigue, fever and unexpected weight change. Respiratory: Negative for shortness of breath. Cardiovascular: Negative for chest pain and leg swelling. Gastrointestinal: Negative for abdominal pain, nausea and vomiting. Genitourinary: Negative for difficulty urinating. Musculoskeletal: Positive for arthralgias, gait problem and myalgias.  Negative for back pain, joint swelling, neck pain and neck stiffness. Skin: Negative for color change, pallor, rash and wound. Neurological: Negative for weakness, numbness and headaches. Psychiatric/Behavioral: Negative for dysphoric mood and sleep disturbance. The patient is not nervous/anxious. /80 (Site: Right Upper Arm, Position: Sitting, Cuff Size: Medium Adult)   Pulse 69   Ht 5' 10\" (1.778 m)   Wt (!) 341 lb 9.6 oz (154.9 kg)   SpO2 96%   BMI 49.01 kg/m²     Physical Exam  Vitals and nursing note reviewed. Constitutional:       General: She is not in acute distress. Appearance: She is well-developed. She is not diaphoretic. HENT:      Head: Normocephalic and atraumatic. Eyes:      Conjunctiva/sclera: Conjunctivae normal.      Pupils: Pupils are equal, round, and reactive to light. Neck:      Vascular: No JVD. Cardiovascular:      Rate and Rhythm: Normal rate and regular rhythm. Heart sounds: Normal heart sounds. No murmur heard. No friction rub. No gallop. Pulmonary:      Effort: Pulmonary effort is normal. No respiratory distress. Breath sounds: Normal breath sounds. No wheezing or rales. Chest:      Chest wall: No tenderness. Abdominal:      Palpations: Abdomen is soft. Tenderness: There is no abdominal tenderness. Musculoskeletal:         General: Normal range of motion. Cervical back: Normal range of motion and neck supple. Skin:     General: Skin is warm and dry. Capillary Refill: Capillary refill takes 2 to 3 seconds. Findings: No erythema. Neurological:      Mental Status: She is alert and oriented to person, place, and time. Psychiatric:         Behavior: Behavior normal.         Thought Content: Thought content normal.         Judgment: Judgment normal.         Plan  1. BMI 45.0-49.9, adult [facility]  - [facility]  - External Referral to Dietitian    2. Vitamin D deficiency  Encouraged daily supplementation, D3 2000 IU     3.  Plantar fasciitis  Discussed the etiology of plantar fasciitis in detail and difficulty of treatment. Encouraged nightly stretching with ice and rolling feet/foot. Discussed importance of calf stretching and good orthotics. Rec'd topical NSAIDs scheduled for 2 weeks. If not improved, discussed possible steroid injection if refractory with likely XR prior . While assessing care for this patient, I have reviewed all pertinent lab work/imaging/ specialist notes and care in reference to those problems addressed above in detail. Appropriate medical decision making was based on this. Please note that portions of this note may have been completed with a voice recognition program. Efforts were made to edit the dictations but occasionally words are mis-transcribed. Return if symptoms worsen or fail to improve.

## 2022-04-21 NOTE — PATIENT INSTRUCTIONS
Patient Education      Vitamin D3 2000 IU daily   Plantar Fasciitis: Exercises  Introduction  Here are some examples of exercises for you to try. The exercises may be suggested for a condition or for rehabilitation. Start each exercise slowly. Ease off the exercises if you start to have pain. You will be told when to start these exercises and which ones will work bestfor you. How to do the exercises  Towel stretch    1. Sit with your legs extended and knees straight. 2. Place a towel around your foot just under the toes. 3. Hold each end of the towel in each hand, with your hands above your knees. 4. Pull back with the towel so that your foot stretches toward you. 5. Hold the position for at least 15 to 30 seconds. 6. Repeat 2 to 4 times a session, up to 5 sessions a day. Calf stretch    This exercise stretches the muscles at the back of the lower leg (the calf) andthe Achilles tendon. Do this exercise 3 or 4 times a day, 5 days a week. 1. Stand facing a wall with your hands on the wall at about eye level. Put the leg you want to stretch about a step behind your other leg. 2. Keeping your back heel on the floor, bend your front knee until you feel a stretch in the back leg. 3. Hold the stretch for 15 to 30 seconds. Repeat 2 to 4 times. Plantar fascia and calf stretch    Stretching the plantar fascia and calf muscles can increase flexibility and decrease heel pain. You can do this exercise several times each day and beforeand after activity. 1. Stand on a step as shown above. Be sure to hold on to the banister. 2. Slowly let your heels down over the edge of the step as you relax your calf muscles. You should feel a gentle stretch across the bottom of your foot and up the back of your leg to your knee. 3. Hold the stretch about 15 to 30 seconds, and then tighten your calf muscle a little to bring your heel back up to the level of the step. Repeat 2 to 4 times.   Towel curls    Make this exercise more challenging by placing a weighted object, such as asoup can, on the other end of the towel. 1. While sitting, place your foot on a towel on the floor and scrunch the towel toward you with your toes. 2. Then, also using your toes, push the towel away from you. [place] pickups    1. Put marbles on the floor next to a cup.  2. Using your toes, try to lift the marbles up from the floor and put them in the cup. Follow-up care is a key part of your treatment and safety. Be sure to make and go to all appointments, and call your doctor if you are having problems. It's also a good idea to know your test results and keep alist of the medicines you take. Where can you learn more? Go to https://[employer]. org and sign in to your [employer] account. Enter W267 in the [employer] box to learn more about \"Plantar Fasciitis: Exercises. \"     If you do not have an account, please click on the \"Sign Up Now\" link. Current as of: July 1, 2021               Content Version: 13.2  © 2006-2022 Healthwise, Incorporated. Care instructions adapted under license by [facility] ([facility]). If you have questions about a medical condition or this instruction, always ask your healthcare professional. [address] any warranty or liability for your use of this information.

## 2022-05-13 DIAGNOSIS — F32.A ANXIETY AND DEPRESSION: ICD-10-CM

## 2022-05-13 DIAGNOSIS — F41.9 ANXIETY AND DEPRESSION: ICD-10-CM

## 2022-05-13 RX ORDER — DULOXETIN HYDROCHLORIDE 60 MG/1
CAPSULE, DELAYED RELEASE ORAL
Qty: 90 CAPSULE | Refills: 1 | Status: SHIPPED | OUTPATIENT
Start: 2022-05-13

## 2022-05-13 NOTE — TELEPHONE ENCOUNTER
Refill Request     CONFIRM preferrred pharmacy with the patient. If Mail Order Rx - Pend for 90 day refill. Last Seen: Last Seen Department: 4/21/2022  Last Seen by PCP: 4/21/2022    Last Written: 11/1/2021    Next Appointment:   No future appointments.           Requested Prescriptions     Pending Prescriptions Disp Refills    DULoxetine (CYMBALTA) 60 MG extended release capsule [Pharmacy Med Name: DULOXETINE HCL DR 60 MG CAP] 90 capsule 1     Sig: TAKE 1 CAPSULE BY MOUTH EVERY DAY

## 2022-09-15 ENCOUNTER — PATIENT MESSAGE (OUTPATIENT)
Dept: FAMILY MEDICINE CLINIC | Age: 42
End: 2022-09-15

## 2022-09-16 ENCOUNTER — TELEPHONE (OUTPATIENT)
Dept: FAMILY MEDICINE CLINIC | Age: 42
End: 2022-09-16

## 2022-09-16 DIAGNOSIS — E66.01 CLASS 3 SEVERE OBESITY DUE TO EXCESS CALORIES WITH SERIOUS COMORBIDITY AND BODY MASS INDEX (BMI) OF 45.0 TO 49.9 IN ADULT (HCC): Primary | ICD-10-CM

## 2022-09-16 NOTE — TELEPHONE ENCOUNTER
Sunny Campbell,   Could you please send a referral for weight management to [place] [address] [facility]. I tried the one with [facility] twice and twice left with a bad experience. Id like to try this one instead. Could you please fax the referral to [phone].  I already have an appointment scheduled for end of next month     Thank you,  Leticia Helm

## 2022-09-16 NOTE — TELEPHONE ENCOUNTER
From: Jenae Espana  To: Dr. Anrdes Monteiro: 9/15/2022 3:46 PM EDT  Subject: [facility] Referral     Hi Dr. Usha Noel,   Could you please send a referral for weight management to [place] [address] [facility]. I tried the one with [facility] twice and twice left with a bad experience. Id like to try this one instead. Could you please fax the referral to [phone].  I already have an appointment scheduled for end of next month    Thank you,  Che Mckeon

## 2023-02-03 ENCOUNTER — OFFICE VISIT (OUTPATIENT)
Dept: OBGYN CLINIC | Age: 43
End: 2023-02-03
Payer: COMMERCIAL

## 2023-02-03 VITALS
SYSTOLIC BLOOD PRESSURE: 138 MMHG | TEMPERATURE: 98 F | HEIGHT: 70 IN | BODY MASS INDEX: 41.95 KG/M2 | WEIGHT: 293 LBS | HEART RATE: 63 BPM | DIASTOLIC BLOOD PRESSURE: 82 MMHG

## 2023-02-03 DIAGNOSIS — Z01.419 ENCNTR FOR GYN EXAM (GENERAL) (ROUTINE) W/O ABN FINDINGS: Primary | ICD-10-CM

## 2023-02-03 DIAGNOSIS — Z12.4 PAP SMEAR FOR CERVICAL CANCER SCREENING: ICD-10-CM

## 2023-02-03 DIAGNOSIS — Z30.431 INTRAUTERINE DEVICE SURVEILLANCE: ICD-10-CM

## 2023-02-03 DIAGNOSIS — Z12.39 ENCOUNTER FOR SCREENING FOR MALIGNANT NEOPLASM OF BREAST, UNSPECIFIED SCREENING MODALITY: ICD-10-CM

## 2023-02-03 PROCEDURE — 99396 PREV VISIT EST AGE 40-64: CPT | Performed by: OBSTETRICS & GYNECOLOGY

## 2023-02-03 ASSESSMENT — ENCOUNTER SYMPTOMS
ABDOMINAL PAIN: 0
BACK PAIN: 0
SHORTNESS OF BREATH: 0
SORE THROAT: 0
SINUS PRESSURE: 0
DIARRHEA: 0
VOMITING: 0
NAUSEA: 0
COUGH: 0
CONSTIPATION: 0

## 2023-02-03 NOTE — PROGRESS NOTES
Annual Exam      CC:   Chief Complaint   Patient presents with    Annual Exam       HPI:  38 y.o. presents for her gynecologic annual exam.    Patient seen and examined. Doing well today without complains. Has IUD in place for bleeding. Reports only light spotting with such. 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 She had a lumbar puncture with normal pressures. Denies tobacco use. 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 History of seizures as a child, has not had any for 15 years. Family history significant for VTE in both sisters and a maternal grandmother with breast cancer. Surgical history significant for D&C consistent with benign polyp. Health Maintenance:  Birth control: IUD - inserted 10/2020  Pregnancy plans: None  Safe relationship: Single    Screening:  Last pap smear: 2020 - NILM, HPV negative  History of abnormal pap smears: Denies  Mammogram: Has not had     Review of Systems:   Review of Systems   Constitutional:  Negative for chills and fever. HENT:  Negative for congestion, sinus pressure, sneezing and sore throat. Respiratory:  Negative for cough and shortness of breath. Cardiovascular:  Negative for chest pain and palpitations. Gastrointestinal:  Negative for abdominal pain, constipation, diarrhea, nausea and vomiting. Genitourinary:  Negative for dysuria, frequency, menstrual problem, pelvic pain, vaginal bleeding and vaginal discharge. Musculoskeletal:  Negative for back pain. Neurological:  Positive for headaches. Hx of pseudotumor   Psychiatric/Behavioral: Negative. Breast: Reports increased nipple tenderness.   Denies skin changes, nipple discharge, lesions, dimpling, tenderness or palpable masses     Primary Care Physician: Ana Cunningham MD    Obstetric History  OB History    Para Term  AB Living   0 0 0 0 0 0   SAB IAB Ectopic Molar Multiple Live Births   0 0 0 0 0 0       GynecologicHistory  Menstrual History:  LMP: No LMP recorded. (Menstrual status: IUD). Age of Menarche: 14-15  Occasional light bleeding with IUD    Sexual History:  Contraception: see above  Currently is not sexually active  2 Lifetime partners  Denies history of STIs  Denies sexual problems    Pap History:  History of abnormal pap smears: see above  Last pap: see above      Medical History:  Past Medical History:   Diagnosis Date    Anxiety and depression 12/07/2018    Anxiety and depression     BMI 45.0-49.9, adult ([facility] [address] 04/21/2022    Exercise-induced asthma 05/15/2020    Menopausal symptoms     Neurologic disorder     Patellar tendinitis of left knee     Primary osteoarthritis of left knee 05/02/2019    Seizures (HCC)     last epileptic episode was 12 years ago       Medications:  Current Outpatient Medications   Medication Sig Dispense Refill    levonorgestrel (MIRENA, 52 MG,) IUD 52 mg 1 each by Intrauterine route once      FLUoxetine (PROZAC) 10 MG capsule Take 2 capsules by mouth daily 90 capsule 1    DULoxetine (CYMBALTA) 60 MG extended release capsule TAKE 1 CAPSULE BY MOUTH EVERY DAY (Patient not taking: Reported on 2/3/2023) 90 capsule 1    albuterol sulfate HFA (PROAIR HFA) 108 (90 Base) MCG/ACT inhaler Inhale 2 puffs into the lungs every 6 hours as needed for Wheezing (Patient not taking: Reported on 2/3/2023) 1 each 5    diclofenac sodium (VOLTAREN) 1 % GEL Apply 2 g topically 4 times daily (Patient not taking: Reported on 2/3/2023) 2 Tube 3     No current facility-administered medications for this visit.        Surgical History:  Past Surgical History:   Procedure Laterality Date    DILATION AND CURETTAGE OF UTERUS N/A 10/8/2020    VIDEO  HYSTEROSCOPY DILATATION AND CURETTAGE, MYOSURE POLYPECTOMY, INSERTION OF MIRENA performed by Raisa Cooper DO at [address]      Bone spur removal       Allergies:  No Known Allergies    Family History:  Family History   Problem Relation Age of Onset    Diabetes Mother     Seizures Mother     Asthma Mother     Arthritis Mother     High Blood Pressure Father     Hypertension Father     Elevated Lipids Father     Depression Father     High Blood Pressure Brother     Hypertension Brother     Elevated Lipids Brother     Depression Brother     Cancer Maternal Grandmother         ?breast     Depression Sister        Reports personal/family history of cervical, uterine, ovarian, vulvar, breast, or colon cancers. - Maternal grandmother - breast cancer - unknown age  Reports personal/family history of bleeding or clotting disorders     - Both sisters with VTE, reports clotting dysfunction that required \"shots during pregnancy. \"  Unknown personal/family history of genetic disorders    Social History:  Social History     Socioeconomic History    Marital status: Single   Tobacco Use    Smoking status: Never    Smokeless tobacco: Never   Vaping Use    Vaping Use: Never used   Substance and Sexual Activity    Alcohol use: Yes     Alcohol/week: 1.0 standard drink     Types: 1 Glasses of wine per week     Comment: occasional    Drug use: No    Sexual activity: Not Currently     Partners: Male       Objective: Body mass index is 53.06 kg/m². /82 (Site: Left Upper Arm, Position: Sitting, Cuff Size: Large Adult)   Pulse 63   Temp 98 °F (36.7 °C) (Infrared)   Ht 5' 10\" (1.778 m)   Wt (!) 369 lb 12.8 oz (167.7 kg)   BMI 53.06 kg/m²     Exam:   Physical Exam  Vitals reviewed. Exam conducted with a chaperone present. Constitutional:       General: She is not in acute distress. Appearance: She is well-developed. She is obese. HENT:      Head: Normocephalic and atraumatic. Eyes:      Extraocular Movements: Extraocular movements intact. Conjunctiva/sclera: Conjunctivae normal.   Neck:      Thyroid: No thyromegaly. Cardiovascular:      Rate and Rhythm: Normal rate.    Pulmonary: Effort: Pulmonary effort is normal.   Chest:   Breasts:     Right: No mass, nipple discharge, skin change or tenderness. Left: No mass, nipple discharge, skin change or tenderness. Abdominal:      General: There is no distension. Palpations: Abdomen is soft. There is no mass. Tenderness: There is no abdominal tenderness. There is no guarding or rebound. Genitourinary:     General: Normal vulva. Labia:         Right: No rash, tenderness or lesion. Left: No rash, tenderness or lesion. Vagina: No vaginal discharge, erythema, tenderness, bleeding or lesions. Cervix: No cervical motion tenderness, discharge, friability, lesion, erythema or cervical bleeding. Uterus: Not deviated, not enlarged, not fixed and not tender. Adnexa:         Right: No mass, tenderness or fullness. Left: No mass, tenderness or fullness. Comments: IUD strings visible at external cervical os. Exam limited secondary to body habitus   Musculoskeletal:         General: No swelling. Cervical back: Normal range of motion and neck supple. Skin:     General: Skin is warm and dry. Neurological:      Mental Status: She is alert and oriented to person, place, and time. Psychiatric:         Mood and Affect: Mood normal.         Behavior: Behavior normal.         Thought Content: Thought content normal.       Assessment/Plan:  38 y.o. presenting for her annual exam:    1. Encntr for gyn exam (general) (routine) w abnormal findings     - Pap smear collected today - will call with results     - Age based screening recommendations discussed     - Self breast exams/awareness discussed with the patient     - Healthy lifestyle habits discussed     - Will follow-up in 1 year for annual exam     2.  Pap smear for cervical cancer screening     - Pap smear collected today - will call with results     - Age based screening recommendations discussed     - Will follow-up in 1 year for annual exam     3. Encounter for screening for malignant neoplasm of breast, unspecified screening modality     - [facility] DIGITAL SCREEN W OR WO CAD BILATERAL; Future     - Age based screening recommendations discussed     - Self breast exams/awareness discussed with the patient    4.  Intrauterine device surveillance     - Mirena placed 10/2020     - Doing well with such      - Denies side effects     - Will continue to follow annually     Laura Clements DO

## 2023-02-07 ENCOUNTER — HOSPITAL ENCOUNTER (OUTPATIENT)
Dept: WOMENS IMAGING | Age: 43
Discharge: HOME | End: 2023-02-07
Payer: COMMERCIAL

## 2023-02-07 DIAGNOSIS — Z12.39 ENCOUNTER FOR SCREENING FOR MALIGNANT NEOPLASM OF BREAST, UNSPECIFIED SCREENING MODALITY: ICD-10-CM

## 2023-02-07 PROCEDURE — 77063 BREAST TOMOSYNTHESIS BI: CPT

## 2024-06-24 ENCOUNTER — HOSPITAL ENCOUNTER (OUTPATIENT)
Dept: MAMMOGRAPHY | Age: 44
Discharge: HOME | End: 2024-06-29

## 2024-06-24 VITALS — BODY MASS INDEX: 41.95 KG/M2 | HEIGHT: 70 IN | WEIGHT: 293 LBS

## 2024-06-24 DIAGNOSIS — Z12.31 VISIT FOR SCREENING MAMMOGRAM: ICD-10-CM

## 2024-06-24 PROCEDURE — 77063 BREAST TOMOSYNTHESIS BI: CPT

## 2025-06-02 ENCOUNTER — OFFICE VISIT (OUTPATIENT)
Dept: OBGYN CLINIC | Age: 45
End: 2025-06-02
Payer: COMMERCIAL

## 2025-06-02 VITALS
HEART RATE: 66 BPM | BODY MASS INDEX: 41.95 KG/M2 | HEIGHT: 70 IN | DIASTOLIC BLOOD PRESSURE: 74 MMHG | WEIGHT: 293 LBS | TEMPERATURE: 98.1 F | SYSTOLIC BLOOD PRESSURE: 116 MMHG

## 2025-06-02 DIAGNOSIS — Z12.31 ENCOUNTER FOR SCREENING MAMMOGRAM FOR BREAST CANCER: ICD-10-CM

## 2025-06-02 DIAGNOSIS — Z01.419 ENCNTR FOR GYN EXAM (GENERAL) (ROUTINE) W/O ABN FINDINGS: Primary | ICD-10-CM

## 2025-06-02 DIAGNOSIS — Z30.431 INTRAUTERINE DEVICE SURVEILLANCE: ICD-10-CM

## 2025-06-02 DIAGNOSIS — Z12.4 PAP SMEAR FOR CERVICAL CANCER SCREENING: ICD-10-CM

## 2025-06-02 PROCEDURE — 99396 PREV VISIT EST AGE 40-64: CPT | Performed by: OBSTETRICS & GYNECOLOGY

## 2025-06-02 RX ORDER — FLUOXETINE HYDROCHLORIDE 40 MG/1
CAPSULE ORAL
COMMUNITY
Start: 2025-04-14

## 2025-06-02 RX ORDER — LOSARTAN POTASSIUM 25 MG/1
25 TABLET ORAL DAILY
COMMUNITY

## 2025-06-02 RX ORDER — MELOXICAM 15 MG/1
15 TABLET ORAL DAILY
COMMUNITY

## 2025-06-02 ASSESSMENT — PATIENT HEALTH QUESTIONNAIRE - PHQ9
3. TROUBLE FALLING OR STAYING ASLEEP: SEVERAL DAYS
10. IF YOU CHECKED OFF ANY PROBLEMS, HOW DIFFICULT HAVE THESE PROBLEMS MADE IT FOR YOU TO DO YOUR WORK, TAKE CARE OF THINGS AT HOME, OR GET ALONG WITH OTHER PEOPLE: NOT DIFFICULT AT ALL
7. TROUBLE CONCENTRATING ON THINGS, SUCH AS READING THE NEWSPAPER OR WATCHING TELEVISION: SEVERAL DAYS
SUM OF ALL RESPONSES TO PHQ QUESTIONS 1-9: 7
6. FEELING BAD ABOUT YOURSELF - OR THAT YOU ARE A FAILURE OR HAVE LET YOURSELF OR YOUR FAMILY DOWN: SEVERAL DAYS
9. THOUGHTS THAT YOU WOULD BE BETTER OFF DEAD, OR OF HURTING YOURSELF: NOT AT ALL
4. FEELING TIRED OR HAVING LITTLE ENERGY: SEVERAL DAYS
5. POOR APPETITE OR OVEREATING: SEVERAL DAYS
2. FEELING DOWN, DEPRESSED OR HOPELESS: SEVERAL DAYS
8. MOVING OR SPEAKING SO SLOWLY THAT OTHER PEOPLE COULD HAVE NOTICED. OR THE OPPOSITE, BEING SO FIGETY OR RESTLESS THAT YOU HAVE BEEN MOVING AROUND A LOT MORE THAN USUAL: SEVERAL DAYS

## 2025-06-02 ASSESSMENT — ENCOUNTER SYMPTOMS
COUGH: 0
SORE THROAT: 0
VOMITING: 0
BACK PAIN: 0
CONSTIPATION: 0
ABDOMINAL PAIN: 0
DIARRHEA: 0
NAUSEA: 0
SINUS PRESSURE: 0
SHORTNESS OF BREATH: 0

## 2025-06-02 NOTE — PROGRESS NOTES
Annual Exam      CC:   Chief Complaint   Patient presents with    Annual Exam       HPI:  44 y.o. presents for her gynecologic annual exam.    Patient seen and examined. Doing well today without complains.     Has IUD in place for bleeding.    Reports only light spotting with such.     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  She had a lumbar puncture with normal pressures.  Denies tobacco use.  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  History of seizures as a child, has not had any for 15 years.  Family history significant for VTE in both sisters and a maternal grandmother with breast cancer.   Since last visit was diagnosed with a binge eating disorder, anxiety/depression.      Surgical history significant for D&C consistent with benign polyp.      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   HENT:  Negative for congestion, sinus pressure, sneezing and sore throat.    Respiratory:  Negative for cough and shortness of breath.    Cardiovascular:  Negative for chest pain and palpitations.   Gastrointestinal:  Negative for abdominal pain, constipation, diarrhea, nausea and vomiting.   Genitourinary:  Negative for dysuria, frequency, menstrual problem, pelvic pain, vaginal bleeding and vaginal discharge.   Musculoskeletal:  Negative for back pain.   Neurological:  Positive for headaches.        Hx of pseudotumor   Psychiatric/Behavioral: Negative.     Breast:

## 2025-06-04 LAB
HPV HR 12 DNA SPEC QL NAA+PROBE: NOT DETECTED
HPV16 DNA SPEC QL NAA+PROBE: NOT DETECTED
HPV16+18+H RISK 12 DNA SPEC-IMP: NORMAL
HPV18 DNA SPEC QL NAA+PROBE: NOT DETECTED

## 2025-06-05 ENCOUNTER — RESULTS FOLLOW-UP (OUTPATIENT)
Dept: OBGYN CLINIC | Age: 45
End: 2025-06-05

## 2025-06-25 ENCOUNTER — HOSPITAL ENCOUNTER (OUTPATIENT)
Dept: MAMMOGRAPHY | Age: 45
Discharge: HOME | End: 2025-06-30
Payer: COMMERCIAL

## 2025-06-25 VITALS — HEIGHT: 70 IN | WEIGHT: 293 LBS | BODY MASS INDEX: 41.95 KG/M2

## 2025-06-25 DIAGNOSIS — Z12.31 VISIT FOR SCREENING MAMMOGRAM: ICD-10-CM

## 2025-06-25 PROCEDURE — 77063 BREAST TOMOSYNTHESIS BI: CPT

## 2025-06-26 ENCOUNTER — RESULTS FOLLOW-UP (OUTPATIENT)
Dept: OBGYN | Age: 45
End: 2025-06-26

## (undated) DEVICE — 3M™ TEGADERM™ TRANSPARENT FILM DRESSING FRAME STYLE, 1624W, 2-3/8 IN X 2-3/4 IN (6 CM X 7 CM), 100/CT 4CT/CASE: Brand: 3M™ TEGADERM™

## (undated) DEVICE — GLOVE,SURG,SENSICARE,ALOE,LF,PF,7: Brand: MEDLINE

## (undated) DEVICE — PVC URETHRAL CATHETER: Brand: DOVER

## (undated) DEVICE — TRAY PREP DRY W/ PREM GLV 2 APPL 6 SPNG 2 UNDPD 1 OVERWRAP

## (undated) DEVICE — DEVICE TISS REM IU CANSTR VAC TB FT PEDAL DISPOSABLE MYOSURE

## (undated) DEVICE — CATHETER IV 20GA L1.25IN PNK FEP SFTY STR HUB RADPQ DISP

## (undated) DEVICE — DRAPE,UNDERBUTTOCKS,PCH,STERILE: Brand: MEDLINE

## (undated) DEVICE — SET FLD MGMT OUTFLO TB DISP FOR CTRL SYS AQUILEX

## (undated) DEVICE — PAD,NON-ADHERENT,3X8,STERILE,LF,1/PK: Brand: MEDLINE

## (undated) DEVICE — SET FLD MGMT CTRL SYS INFLO TB AQUILEX

## (undated) DEVICE — SOLUTION IRRIG 5L LAC R BG

## (undated) DEVICE — GLOVE,SURG,SENSICARE,ALOE,LF,PF,6: Brand: MEDLINE

## (undated) DEVICE — Z CONVERTED USE 2271320 CANISTER SUCT ACC ADPT SPEC COLL

## (undated) DEVICE — GLOVE ORANGE PI 7   MSG9070

## (undated) DEVICE — INVIEW CLEAR LEGGINGS: Brand: CONVERTORS

## (undated) DEVICE — SET ENDOSCP SEAL HYSTEROSCOPE RIG OUTFLO CHN DISP MYOSURE

## (undated) DEVICE — GLOVE SURG SZ 65 L12IN FNGR THK94MIL STD WHT LTX FREE

## (undated) DEVICE — QUILTED PREMIUM COMFORT UNDERPAD,EXTRA HEAVY: Brand: WINGS

## (undated) DEVICE — MINOR SET UP PK

## (undated) DEVICE — SOLUTION IV 1000ML LAC RINGERS PH 6.5 INJ USP VIAFLX PLAS

## (undated) DEVICE — SHEET,DRAPE,53X77,STERILE: Brand: MEDLINE